# Patient Record
Sex: MALE | Race: WHITE | NOT HISPANIC OR LATINO | Employment: FULL TIME | URBAN - METROPOLITAN AREA
[De-identification: names, ages, dates, MRNs, and addresses within clinical notes are randomized per-mention and may not be internally consistent; named-entity substitution may affect disease eponyms.]

---

## 2017-01-03 RX ORDER — VALSARTAN 320 MG/1
TABLET ORAL
Qty: 90 TABLET | Refills: 1 | Status: SHIPPED | OUTPATIENT
Start: 2017-01-03 | End: 2017-07-03 | Stop reason: SDUPTHER

## 2017-01-10 ENCOUNTER — APPOINTMENT (OUTPATIENT)
Dept: GENERAL RADIOLOGY | Facility: HOSPITAL | Age: 72
End: 2017-01-10

## 2017-01-10 ENCOUNTER — TELEPHONE (OUTPATIENT)
Dept: CARDIOLOGY | Facility: CLINIC | Age: 72
End: 2017-01-10

## 2017-01-10 ENCOUNTER — APPOINTMENT (OUTPATIENT)
Dept: CARDIOLOGY | Facility: HOSPITAL | Age: 72
End: 2017-01-10
Attending: INTERNAL MEDICINE

## 2017-01-10 ENCOUNTER — HOSPITAL ENCOUNTER (OUTPATIENT)
Facility: HOSPITAL | Age: 72
Setting detail: OBSERVATION
Discharge: HOME | End: 2017-01-11
Attending: EMERGENCY MEDICINE | Admitting: INTERNAL MEDICINE

## 2017-01-10 DIAGNOSIS — I48.91 NEW ONSET ATRIAL FIBRILLATION (HCC): Primary | ICD-10-CM

## 2017-01-10 LAB
ALBUMIN SERPL-MCNC: 4.8 G/DL (ref 3.5–5.2)
ALBUMIN/GLOB SERPL: 1.8 G/DL
ALP SERPL-CCNC: 60 U/L (ref 39–117)
ALT SERPL W P-5'-P-CCNC: 19 U/L (ref 1–41)
ANION GAP SERPL CALCULATED.3IONS-SCNC: 15.4 MMOL/L
APTT PPP: 32.8 SECONDS (ref 22.7–35.4)
AST SERPL-CCNC: 22 U/L (ref 1–40)
BASOPHILS # BLD AUTO: 0.06 10*3/MM3 (ref 0–0.2)
BASOPHILS NFR BLD AUTO: 0.9 % (ref 0–1.5)
BH CV ECHO MEAS - ACS: 2 CM
BH CV ECHO MEAS - AO MEAN PG (FULL): 1 MMHG
BH CV ECHO MEAS - AO MEAN PG: 2 MMHG
BH CV ECHO MEAS - AO ROOT AREA (BSA CORRECTED): 1.7
BH CV ECHO MEAS - AO ROOT AREA: 9.6 CM^2
BH CV ECHO MEAS - AO ROOT DIAM: 3.5 CM
BH CV ECHO MEAS - AO V2 MEAN: 65.9 CM/SEC
BH CV ECHO MEAS - AO V2 VTI: 18.1 CM
BH CV ECHO MEAS - ASC AORTA: 3.1 CM
BH CV ECHO MEAS - AVA(I,A): 2.3 CM^2
BH CV ECHO MEAS - AVA(I,D): 2.3 CM^2
BH CV ECHO MEAS - BSA(HAYCOCK): 2.2 M^2
BH CV ECHO MEAS - BSA: 2.1 M^2
BH CV ECHO MEAS - BZI_BMI: 31.6 KILOGRAMS/M^2
BH CV ECHO MEAS - BZI_METRIC_HEIGHT: 172.7 CM
BH CV ECHO MEAS - BZI_METRIC_WEIGHT: 94.3 KG
BH CV ECHO MEAS - CONTRAST EF (2CH): 62.2 ML/M^2
BH CV ECHO MEAS - CONTRAST EF 4CH: 61 ML/M^2
BH CV ECHO MEAS - EDV(CUBED): 74.1 ML
BH CV ECHO MEAS - EDV(MOD-SP2): 111 ML
BH CV ECHO MEAS - EDV(MOD-SP4): 105 ML
BH CV ECHO MEAS - EDV(TEICH): 78.6 ML
BH CV ECHO MEAS - EF(CUBED): 76.3 %
BH CV ECHO MEAS - EF(MOD-SP2): 62.2 %
BH CV ECHO MEAS - EF(MOD-SP4): 61 %
BH CV ECHO MEAS - EF(TEICH): 68.7 %
BH CV ECHO MEAS - ESV(CUBED): 17.6 ML
BH CV ECHO MEAS - ESV(MOD-SP2): 42 ML
BH CV ECHO MEAS - ESV(MOD-SP4): 41 ML
BH CV ECHO MEAS - ESV(TEICH): 24.6 ML
BH CV ECHO MEAS - FS: 38.1 %
BH CV ECHO MEAS - IVS/LVPW: 1
BH CV ECHO MEAS - IVSD: 1 CM
BH CV ECHO MEAS - LAT PEAK E' VEL: 15 CM/SEC
BH CV ECHO MEAS - LV DIASTOLIC VOL/BSA (35-75): 50.5 ML/M^2
BH CV ECHO MEAS - LV MASS(C)D: 137.2 GRAMS
BH CV ECHO MEAS - LV MASS(C)DI: 66 GRAMS/M^2
BH CV ECHO MEAS - LV MEAN PG: 1 MMHG
BH CV ECHO MEAS - LV SYSTOLIC VOL/BSA (12-30): 19.7 ML/M^2
BH CV ECHO MEAS - LV V1 MEAN: 50.6 CM/SEC
BH CV ECHO MEAS - LV V1 VTI: 13.5 CM
BH CV ECHO MEAS - LVIDD: 4.2 CM
BH CV ECHO MEAS - LVIDS: 2.6 CM
BH CV ECHO MEAS - LVLD AP2: 8.1 CM
BH CV ECHO MEAS - LVLD AP4: 7.9 CM
BH CV ECHO MEAS - LVLS AP2: 7 CM
BH CV ECHO MEAS - LVLS AP4: 6.4 CM
BH CV ECHO MEAS - LVOT AREA (M): 3.1 CM^2
BH CV ECHO MEAS - LVOT AREA: 3.1 CM^2
BH CV ECHO MEAS - LVOT DIAM: 2 CM
BH CV ECHO MEAS - LVPWD: 1 CM
BH CV ECHO MEAS - MED PEAK E' VEL: 11 CM/SEC
BH CV ECHO MEAS - MV DEC SLOPE: 457 CM/SEC^2
BH CV ECHO MEAS - MV DEC TIME: 0.22 SEC
BH CV ECHO MEAS - MV E MAX VEL: 68.3 CM/SEC
BH CV ECHO MEAS - MV MEAN PG: 1 MMHG
BH CV ECHO MEAS - MV P1/2T MAX VEL: 82.1 CM/SEC
BH CV ECHO MEAS - MV P1/2T: 52.6 MSEC
BH CV ECHO MEAS - MV V2 MEAN: 41.6 CM/SEC
BH CV ECHO MEAS - MV V2 VTI: 18.7 CM
BH CV ECHO MEAS - MVA P1/2T LCG: 2.7 CM^2
BH CV ECHO MEAS - MVA(P1/2T): 4.2 CM^2
BH CV ECHO MEAS - MVA(VTI): 2.3 CM^2
BH CV ECHO MEAS - PA ACC SLOPE: 12.9 CM/SEC^2
BH CV ECHO MEAS - PA ACC TIME: 0.11 SEC
BH CV ECHO MEAS - PA MAX PG: 1.9 MMHG
BH CV ECHO MEAS - PA PR(ACCEL): 31.3 MMHG
BH CV ECHO MEAS - PA V2 MAX: 68.5 CM/SEC
BH CV ECHO MEAS - QP/QS: 0.78
BH CV ECHO MEAS - RAP SYSTOLE: 3 MMHG
BH CV ECHO MEAS - RV MEAN PG: 0 MMHG
BH CV ECHO MEAS - RV V1 MEAN: 24.4 CM/SEC
BH CV ECHO MEAS - RV V1 VTI: 8.7 CM
BH CV ECHO MEAS - RVOT AREA: 3.8 CM^2
BH CV ECHO MEAS - RVOT DIAM: 2.2 CM
BH CV ECHO MEAS - RVSP: 20.5 MMHG
BH CV ECHO MEAS - SI(AO): 83.8 ML/M^2
BH CV ECHO MEAS - SI(CUBED): 27.2 ML/M^2
BH CV ECHO MEAS - SI(LVOT): 20.4 ML/M^2
BH CV ECHO MEAS - SI(MOD-SP2): 33.2 ML/M^2
BH CV ECHO MEAS - SI(MOD-SP4): 30.8 ML/M^2
BH CV ECHO MEAS - SI(TEICH): 26 ML/M^2
BH CV ECHO MEAS - SV(AO): 174.1 ML
BH CV ECHO MEAS - SV(CUBED): 56.5 ML
BH CV ECHO MEAS - SV(LVOT): 42.4 ML
BH CV ECHO MEAS - SV(MOD-SP2): 69 ML
BH CV ECHO MEAS - SV(MOD-SP4): 64 ML
BH CV ECHO MEAS - SV(RVOT): 33.2 ML
BH CV ECHO MEAS - SV(TEICH): 54 ML
BH CV ECHO MEAS - TAPSE (>1.6): 1.8 CM2
BH CV ECHO MEAS - TR MAX VEL: 209 CM/SEC
BH CV XLRA - RV BASE: 4 CM
BH CV XLRA - TDI S': 11 CM/SEC
BILIRUB SERPL-MCNC: 0.7 MG/DL (ref 0.1–1.2)
BUN BLD-MCNC: 20 MG/DL (ref 8–23)
BUN/CREAT SERPL: 20.6 (ref 7–25)
CALCIUM SPEC-SCNC: 9.6 MG/DL (ref 8.6–10.5)
CHLORIDE SERPL-SCNC: 103 MMOL/L (ref 98–107)
CO2 SERPL-SCNC: 24.6 MMOL/L (ref 22–29)
CREAT BLD-MCNC: 0.97 MG/DL (ref 0.76–1.27)
DEPRECATED RDW RBC AUTO: 44.9 FL (ref 37–54)
E/E' RATIO: 5.5
EOSINOPHIL # BLD AUTO: 0.15 10*3/MM3 (ref 0–0.7)
EOSINOPHIL NFR BLD AUTO: 2.3 % (ref 0.3–6.2)
ERYTHROCYTE [DISTWIDTH] IN BLOOD BY AUTOMATED COUNT: 13.1 % (ref 11.5–14.5)
GFR SERPL CREATININE-BSD FRML MDRD: 76 ML/MIN/1.73
GLOBULIN UR ELPH-MCNC: 2.7 GM/DL
GLUCOSE BLD-MCNC: 120 MG/DL (ref 65–99)
HCT VFR BLD AUTO: 45.8 % (ref 40.4–52.2)
HGB BLD-MCNC: 15.2 G/DL (ref 13.7–17.6)
HOLD SPECIMEN: NORMAL
HOLD SPECIMEN: NORMAL
IMM GRANULOCYTES # BLD: 0 10*3/MM3 (ref 0–0.03)
IMM GRANULOCYTES NFR BLD: 0 % (ref 0–0.5)
INR PPP: 1.09 (ref 0.9–1.1)
LEFT ATRIUM VOLUME INDEX: 34 ML/M2
LYMPHOCYTES # BLD AUTO: 2.11 10*3/MM3 (ref 0.9–4.8)
LYMPHOCYTES NFR BLD AUTO: 32.5 % (ref 19.6–45.3)
MAGNESIUM SERPL-MCNC: 1.9 MG/DL (ref 1.6–2.4)
MCH RBC QN AUTO: 31 PG (ref 27–32.7)
MCHC RBC AUTO-ENTMCNC: 33.2 G/DL (ref 32.6–36.4)
MCV RBC AUTO: 93.5 FL (ref 79.8–96.2)
MONOCYTES # BLD AUTO: 0.37 10*3/MM3 (ref 0.2–1.2)
MONOCYTES NFR BLD AUTO: 5.7 % (ref 5–12)
NEUTROPHILS # BLD AUTO: 3.81 10*3/MM3 (ref 1.9–8.1)
NEUTROPHILS NFR BLD AUTO: 58.6 % (ref 42.7–76)
PLATELET # BLD AUTO: 179 10*3/MM3 (ref 140–500)
PMV BLD AUTO: 9.9 FL (ref 6–12)
POTASSIUM BLD-SCNC: 3.6 MMOL/L (ref 3.5–5.2)
PROT SERPL-MCNC: 7.5 G/DL (ref 6–8.5)
PROTHROMBIN TIME: 13.7 SECONDS (ref 11.7–14.2)
RBC # BLD AUTO: 4.9 10*6/MM3 (ref 4.6–6)
SODIUM BLD-SCNC: 143 MMOL/L (ref 136–145)
T4 FREE SERPL-MCNC: 1.16 NG/DL (ref 0.93–1.7)
TROPONIN T SERPL-MCNC: <0.01 NG/ML (ref 0–0.03)
TROPONIN T SERPL-MCNC: <0.01 NG/ML (ref 0–0.03)
TSH SERPL DL<=0.05 MIU/L-ACNC: 2.32 MIU/ML (ref 0.27–4.2)
WBC NRBC COR # BLD: 6.5 10*3/MM3 (ref 4.5–10.7)
WHOLE BLOOD HOLD SPECIMEN: NORMAL
WHOLE BLOOD HOLD SPECIMEN: NORMAL

## 2017-01-10 PROCEDURE — 93010 ELECTROCARDIOGRAM REPORT: CPT | Performed by: INTERNAL MEDICINE

## 2017-01-10 PROCEDURE — 85025 COMPLETE CBC W/AUTO DIFF WBC: CPT | Performed by: EMERGENCY MEDICINE

## 2017-01-10 PROCEDURE — 80053 COMPREHEN METABOLIC PANEL: CPT | Performed by: EMERGENCY MEDICINE

## 2017-01-10 PROCEDURE — G0378 HOSPITAL OBSERVATION PER HR: HCPCS

## 2017-01-10 PROCEDURE — 93005 ELECTROCARDIOGRAM TRACING: CPT | Performed by: INTERNAL MEDICINE

## 2017-01-10 PROCEDURE — 84484 ASSAY OF TROPONIN QUANT: CPT | Performed by: EMERGENCY MEDICINE

## 2017-01-10 PROCEDURE — 25010000002 PERFLUTREN (DEFINITY) 8.476 MG IN SODIUM CHLORIDE 10 ML INJECTION: Performed by: INTERNAL MEDICINE

## 2017-01-10 PROCEDURE — 93306 TTE W/DOPPLER COMPLETE: CPT | Performed by: INTERNAL MEDICINE

## 2017-01-10 PROCEDURE — 96376 TX/PRO/DX INJ SAME DRUG ADON: CPT

## 2017-01-10 PROCEDURE — 99285 EMERGENCY DEPT VISIT HI MDM: CPT

## 2017-01-10 PROCEDURE — 84439 ASSAY OF FREE THYROXINE: CPT | Performed by: EMERGENCY MEDICINE

## 2017-01-10 PROCEDURE — 84443 ASSAY THYROID STIM HORMONE: CPT | Performed by: EMERGENCY MEDICINE

## 2017-01-10 PROCEDURE — 99219 PR INITIAL OBSERVATION CARE/DAY 50 MINUTES: CPT | Performed by: INTERNAL MEDICINE

## 2017-01-10 PROCEDURE — 96375 TX/PRO/DX INJ NEW DRUG ADDON: CPT

## 2017-01-10 PROCEDURE — 93005 ELECTROCARDIOGRAM TRACING: CPT

## 2017-01-10 PROCEDURE — 83735 ASSAY OF MAGNESIUM: CPT | Performed by: EMERGENCY MEDICINE

## 2017-01-10 PROCEDURE — C8929 TTE W OR WO FOL WCON,DOPPLER: HCPCS

## 2017-01-10 PROCEDURE — 85610 PROTHROMBIN TIME: CPT | Performed by: EMERGENCY MEDICINE

## 2017-01-10 PROCEDURE — 96365 THER/PROPH/DIAG IV INF INIT: CPT

## 2017-01-10 PROCEDURE — 84484 ASSAY OF TROPONIN QUANT: CPT | Performed by: INTERNAL MEDICINE

## 2017-01-10 PROCEDURE — 71010 HC CHEST PA OR AP: CPT

## 2017-01-10 PROCEDURE — 85730 THROMBOPLASTIN TIME PARTIAL: CPT | Performed by: EMERGENCY MEDICINE

## 2017-01-10 PROCEDURE — 96366 THER/PROPH/DIAG IV INF ADDON: CPT

## 2017-01-10 RX ORDER — METOPROLOL SUCCINATE 25 MG/1
25 TABLET, EXTENDED RELEASE ORAL EVERY 12 HOURS SCHEDULED
Status: DISCONTINUED | OUTPATIENT
Start: 2017-01-10 | End: 2017-01-11 | Stop reason: HOSPADM

## 2017-01-10 RX ORDER — SODIUM CHLORIDE 0.9 % (FLUSH) 0.9 %
10 SYRINGE (ML) INJECTION AS NEEDED
Status: DISCONTINUED | OUTPATIENT
Start: 2017-01-10 | End: 2017-01-11 | Stop reason: HOSPADM

## 2017-01-10 RX ORDER — VALSARTAN 320 MG/1
320 TABLET ORAL
Status: DISCONTINUED | OUTPATIENT
Start: 2017-01-10 | End: 2017-01-11 | Stop reason: HOSPADM

## 2017-01-10 RX ORDER — ASPIRIN 81 MG/1
81 TABLET ORAL ONCE
Status: DISCONTINUED | OUTPATIENT
Start: 2017-01-10 | End: 2017-01-11 | Stop reason: HOSPADM

## 2017-01-10 RX ORDER — FINASTERIDE 5 MG/1
5 TABLET, FILM COATED ORAL DAILY
Status: DISCONTINUED | OUTPATIENT
Start: 2017-01-10 | End: 2017-01-11 | Stop reason: HOSPADM

## 2017-01-10 RX ORDER — SODIUM CHLORIDE 0.9 % (FLUSH) 0.9 %
1-10 SYRINGE (ML) INJECTION AS NEEDED
Status: DISCONTINUED | OUTPATIENT
Start: 2017-01-10 | End: 2017-01-11 | Stop reason: HOSPADM

## 2017-01-10 RX ORDER — ASPIRIN 325 MG
325 TABLET ORAL ONCE
Status: DISCONTINUED | OUTPATIENT
Start: 2017-01-10 | End: 2017-01-10

## 2017-01-10 RX ORDER — CETIRIZINE HYDROCHLORIDE 10 MG/1
10 TABLET ORAL DAILY
Status: DISCONTINUED | OUTPATIENT
Start: 2017-01-10 | End: 2017-01-11 | Stop reason: HOSPADM

## 2017-01-10 RX ADMIN — DILTIAZEM HYDROCHLORIDE 5 MG/HR: 100 INJECTION, POWDER, LYOPHILIZED, FOR SOLUTION INTRAVENOUS at 12:03

## 2017-01-10 RX ADMIN — METOPROLOL SUCCINATE 25 MG: 25 TABLET, FILM COATED, EXTENDED RELEASE ORAL at 18:37

## 2017-01-10 RX ADMIN — METOPROLOL TARTRATE 5 MG: 5 INJECTION INTRAVENOUS at 13:51

## 2017-01-10 RX ADMIN — PERFLUTREN 2 ML: 6.52 INJECTION, SUSPENSION INTRAVENOUS at 16:58

## 2017-01-10 RX ADMIN — FINASTERIDE 5 MG: 5 TABLET, FILM COATED ORAL at 18:37

## 2017-01-10 RX ADMIN — METOPROLOL TARTRATE 5 MG: 5 INJECTION INTRAVENOUS at 13:42

## 2017-01-10 NOTE — IP AVS SNAPSHOT
AFTER VISIT SUMMARY             Carlo Archer           About your hospitalization     You were admitted on:  January 10, 2017 You last received care in the:  [address] [facility]       Procedures & Surgeries         Medications    If you or your caregiver advised us that you are currently taking a medication and that medication is marked below as “Resume”, this simply indicates that we have reviewed those medications to make sure our new therapy recommendations do not interfere.  If you have concerns about medications other than those new ones which we are prescribing today, please consult the physician who prescribed them (or your primary physician).  Our review of your home medications is not meant to indicate that we are directing their use.             Your Medications      START taking these medications     metoprolol succinate XL 25 MG 24 hr tablet   Take 0.5 tablets by mouth Daily.   Last time this was given:  1/11/2017  8:50 AM   Commonly known as:  TOPROL-XL             CONTINUE taking these medications     aspirin 81 MG tablet   Take  by mouth.           atorvastatin 40 MG tablet   Take one tablet qhs. MUST MAKE A FOLLOW UP AND GET CHOLESTEROL LABS PRIOR TO ANY FURTHER REFILLS OR A 90 DAY SUPPLY   Commonly known as:  LIPITOR           BIOTIN PO   Take  by mouth.           CIALIS 5 MG tablet   Take  by mouth.   Generic drug:  tadalafil           CLARITIN 10 MG tablet   Take  by mouth.   Generic drug:  loratadine           EPIPEN 2-MARY 0.3 MG/0.3ML solution auto-injector injection   EpiPen TRINY; Patient Sig: Amairani AGOSTO ; 0; 06-Jan-2015; Active   Generic drug:  EPINEPHrine           finasteride 5 MG tablet   Take  by mouth daily.   Last time this was given:  1/10/2017  6:37 PM   Commonly known as:  PROSCAR           FLOMAX 0.4 MG capsule 24 hr capsule   Take  by mouth.   Generic drug:  tamsulosin           fluticasone 50 MCG/ACT nasal spray   into each nostril.   Commonly known as:   FLONASE           LUMIGAN 0.03 % ophthalmic drops   Apply  to eye.   Generic drug:  bimatoprost           meloxicam 15 MG tablet   Take  by mouth daily.   Commonly known as:  MOBIC           NEXIUM 40 MG capsule   Take  by mouth daily.   Generic drug:  esomeprazole           NIACIN PO   Take  by mouth.           valsartan 320 MG tablet   take 1 tablet by mouth once daily   Last time this was given:  1/11/2017  8:50 AM   Commonly known as:  DIOVAN           VITAMIN B-12 PO   Take  by mouth.             STOP taking these medications     amLODIPine 5 MG tablet   Commonly known as:  NORVASC                Where to Get Your Medications      These medications were sent to [facility] [place] [address], [place] - [address] [address] - [phone]  - [phone] [address] [place] [address]     Phone:  [phone]     metoprolol succinate XL 25 MG 24 hr tablet                  Your Medications      Your Medication List           Morning Noon Evening Bedtime As Needed    aspirin 81 MG tablet   Take  by mouth.            **NEXT DOSE DUE 1/11/17**                       atorvastatin 40 MG tablet   Take one tablet qhs. MUST MAKE A FOLLOW UP AND GET CHOLESTEROL LABS PRIOR TO ANY FURTHER REFILLS OR A 90 DAY SUPPLY   Commonly known as:  LIPITOR            **NEXT DOSE DUE 1/11/17**                       BIOTIN PO   Take  by mouth.            **NEXT DOSE DUE 1/11/17**                       CIALIS 5 MG tablet   Take  by mouth.   Generic drug:  tadalafil                                   CLARITIN 10 MG tablet   Take  by mouth.   Generic drug:  loratadine            **NEXT DUE DOSE 1/11/17**                        EPIPEN 2-MARY 0.3 MG/0.3ML solution auto-injector injection   EpiPen TRINY; Patient Sig: Amairani AGOSTO ; 0; 06-Jan-2015; Active   Generic drug:  EPINEPHrine                                   finasteride 5 MG tablet   Take  by mouth daily.   Commonly known as:  PROSCAR            **NEXT DOSE DUE 1/11/17**                        FLOMAX 0.4 MG capsule 24 hr capsule   Take  by mouth.   Generic drug:  tamsulosin            **NEXT DOSE DUE 1/11/17**                       fluticasone 50 MCG/ACT nasal spray   into each nostril.   Commonly known as:  FLONASE                                   LUMIGAN 0.03 % ophthalmic drops   Apply  to eye.   Generic drug:  bimatoprost                                   meloxicam 15 MG tablet   Take  by mouth daily.   Commonly known as:  MOBIC            **NEXT DOSE DUE 1/11/17**                       metoprolol succinate XL 25 MG 24 hr tablet   Take 0.5 tablets by mouth Daily.   Commonly known as:  TOPROL-XL            **NEXT DOSE DUE 1/12/17**                       NEXIUM 40 MG capsule   Take  by mouth daily.   Generic drug:  esomeprazole            **NEXT DOSE DUE 1/11/17**                       NIACIN PO   Take  by mouth.            **NEXT DOSE DUE 1/11/17**                       valsartan 320 MG tablet   take 1 tablet by mouth once daily   Commonly known as:  DIOVAN            **NEXT DOSE DUE 1/12/17**                       VITAMIN B-12 PO   Take  by mouth.            **NEXT DOSE DUE 1/11/17**                                Instructions for After Discharge        Discharge References/Attachments     ATRIAL FIBRILLATION, EASY-TO-READ (ENGLISH)    METOPROLOL EXTENDED-RELEASE TABLETS (ENGLISH)    CARDIAC DIET, EASY-TO-READ (ENGLISH)       Follow-ups for After Discharge        Follow-up Information     Follow up with Keo Montero MD Follow up in 4 week(s).    Specialty:  Cardiology    Contact information:    [address] [facility] [address]  [place] [address]  [phone]        [employer] Signup     [facility] [employer] allows you to send messages to your doctor, view your test results, renew your prescriptions, schedule appointments, and more. To sign up, go to [employer] and click on the Sign Up Now link in the New User? box. Enter your [employer] Activation Code exactly as it  appears below along with the last four digits of your Social Security Number and your Date of Birth () to complete the sign-up process. If you do not sign up before the expiration date, you must request a new code.    [employer] Activation Code: [email or web address]  Expires: 2017  8:30 AM    If you have questions, you can email [email or web address] or call [phone] to talk to our [employer] staff. Remember, [employer] is NOT to be used for urgent needs. For medical emergencies, dial 911.           Summary of Your Hospitalization        Reason for Hospitalization     Your primary diagnosis was:  Not on File    Your diagnoses also included:  New Onset Atrial Fibrillation      Care Providers     Provider Service Role Specialty    Keo Montero MD -- Attending Provider Cardiology    Keo Montero MD -- Consulting Physician  Cardiology      Your Allergies  Date Reviewed: 1/10/2017    Allergen Reactions    Penicillins Anaphylaxis         Streptomycin Anaphylaxis         Bee Venom Not Noted      Patient Belongings Returned     Document Return of Belongings Flowsheet     Were the patient bedside belongings sent home?   Yes   Belongings Retrieved from Security & Sent Home   N/A    Belongings Sent to Safe   --   Medications Retrieved from Pharmacy & Sent Home   N/A              More Information      Atrial Fibrillation  Atrial fibrillation is a type of heartbeat that is irregular or fast (rapid). If you have this condition, your heart keeps quivering in a weird (chaotic) way. This condition can make it so your heart cannot pump blood normally. Having this condition gives a person more risk for stroke, heart failure, and other heart problems. There are different types of atrial fibrillation. Talk with your doctor to learn about the type that you have.  HOME CARE  · Take over-the-counter and prescription medicines only as told by your doctor.  · If your doctor prescribed a blood-thinning medicine, take it  exactly as told. Taking too much of it can cause bleeding. If you do not take enough of it, you will not have the protection that you need against stroke and other problems.  · Do not use any tobacco products. These include cigarettes, chewing tobacco, and e-cigarettes. If you need help quitting, ask your doctor.  · If you have apnea (obstructive sleep apnea), manage it as told by your doctor.  · Do not drink alcohol.  · Do not drink beverages that have caffeine. These include coffee, soda, and tea.  · Maintain a healthy weight. Do not use diet pills unless your doctor says they are safe for you. Diet pills may make heart problems worse.  · Follow diet instructions as told by your doctor.  · Exercise regularly as told by your doctor.  · Keep all follow-up visits as told by your doctor. This is important.  GET HELP IF:  · You notice a change in the speed, rhythm, or strength of your heartbeat.  · You are taking a blood-thinning medicine and you notice more bruising.  · You get tired more easily when you move or exercise.  GET HELP RIGHT AWAY IF:  · You have pain in your chest or your belly (abdomen).  · You have sweating or weakness.  · You feel sick to your stomach (nauseous).  · You notice blood in your throw up (vomit), poop (stool), or pee (urine).  · You are short of breath.  · You suddenly have swollen feet and ankles.  · You feel dizzy.  · Your suddenly get weak or numb in your face, arms, or legs, especially if it happens on one side of your body.  · You have trouble talking, trouble understanding, or both.  · Your face or your eyelid droops on one side.  These symptoms may be an emergency. Do not wait to see if the symptoms will go away. Get medical help right away. Call your local emergency services (911 in the [place] Do not drive yourself to the hospital.     This information is not intended to replace advice given to you by your health care provider. Make sure you discuss any questions you have with your  health care provider.     Document Released: 09/26/2009 Document Revised: 09/07/2016 Document Reviewed: 04/13/2016  [employer] Interactive Patient Education ©2016 [employer]          Metoprolol extended-release tablets  What is this medicine?  METOPROLOL (me TOE proe lole) is a beta-blocker. Beta-blockers reduce the workload on the heart and help it to beat more regularly. This medicine is used to treat high blood pressure and to prevent chest pain. It is also used to after a heart attack and to prevent an additional heart attack from occurring.  This medicine may be used for other purposes; ask your health care provider or pharmacist if you have questions.  What should I tell my health care provider before I take this medicine?  They need to know if you have any of these conditions:  -diabetes  -heart or vessel disease like slow heart rate, worsening heart failure, heart block, sick sinus syndrome or Raynaud's disease  -kidney disease  -liver disease  -lung or breathing disease, like asthma or emphysema  -pheochromocytoma  -thyroid disease  -an unusual or allergic reaction to metoprolol, other beta-blockers, medicines, foods, dyes, or preservatives  -pregnant or trying to get pregnant  -breast-feeding  How should I use this medicine?  Take this medicine by mouth with a glass of water. Follow the directions on the prescription label. Do not crush or chew. Take this medicine with or immediately after meals. Take your doses at regular intervals. Do not take more medicine than directed. Do not stop taking this medicine suddenly. This could lead to serious heart-related effects.  Talk to your pediatrician regarding the use of this medicine in children. While this drug may be prescribed for children as young as 6 years for selected conditions, precautions do apply.  Overdosage: If you think you have taken too much of this medicine contact a poison control center or emergency room at once.  NOTE: This medicine is only  for you. Do not share this medicine with others.  What if I miss a dose?  If you miss a dose, take it as soon as you can. If it is almost time for your next dose, take only that dose. Do not take double or extra doses.  What may interact with this medicine?  This medicine may interact with the following medications:  -certain medicines for blood pressure, heart disease, irregular heart beat  -certain medicines for depression, like monoamine oxidase (MAO) inhibitors, fluoxetine, or paroxetine  -clonidine  -dobutamine  -epinephrine  -isoproterenol  -reserpine  This list may not describe all possible interactions. Give your health care provider a list of all the medicines, herbs, non-prescription drugs, or dietary supplements you use. Also tell them if you smoke, drink alcohol, or use illegal drugs. Some items may interact with your medicine.  What should I watch for while using this medicine?  Visit your doctor or health care professional for regular check ups. Contact your doctor right away if your symptoms worsen. Check your blood pressure and pulse rate regularly. Ask your health care professional what your blood pressure and pulse rate should be, and when you should contact them.  You may get drowsy or dizzy. Do not drive, use machinery, or do anything that needs mental alertness until you know how this medicine affects you. Do not sit or stand up quickly, especially if you are an older patient. This reduces the risk of dizzy or fainting spells. Contact your doctor if these symptoms continue. Alcohol may interfere with the effect of this medicine. Avoid alcoholic drinks.  What side effects may I notice from receiving this medicine?  Side effects that you should report to your doctor or health care professional as soon as possible:  -allergic reactions like skin rash, itching or hives  -cold or numb hands or feet  -depression  -difficulty breathing  -faint  -fever with sore throat  -irregular heartbeat, chest  pain  -rapid weight gain  -swollen legs or ankles  Side effects that usually do not require medical attention (report to your doctor or health care professional if they continue or are bothersome):  -anxiety or nervousness  -change in sex drive or performance  -dry skin  -headache  -nightmares or trouble sleeping  -short term memory loss  -stomach upset or diarrhea  -unusually tired  This list may not describe all possible side effects. Call your doctor for medical advice about side effects. You may report side effects to FDA at 5-171-HSN-9344.  Where should I keep my medicine?  Keep out of the reach of children.  Store at room temperature between 15 and 30 degrees C (59 and 86 degrees F). Throw away any unused medicine after the expiration date.  NOTE: This sheet is a summary. It may not cover all possible information. If you have questions about this medicine, talk to your doctor, pharmacist, or health care provider.     © 2016, Elsevier/Gold Standard. (2014-08-22 14:41:37)          Heart-Healthy Eating Plan  Heart-healthy meal planning includes:  · Limiting unhealthy fats.  · Increasing healthy fats.  · Making other small dietary changes.  You may need to talk with your doctor or a diet specialist (dietitian) to create an eating plan that is right for you.  WHAT TYPES OF FAT SHOULD I CHOOSE?  · Choose healthy fats. These include olive oil and canola oil, flaxseeds, walnuts, almonds, and seeds.  · Eat more omega-3 fats. These include salmon, mackerel, sardines, tuna, flaxseed oil, and ground flaxseeds. Try to eat fish at least twice each week.  · Limit saturated fats.    Saturated fats are often found in animal products, such as meats, butter, and cream.    Plant sources of saturated fats include palm oil, palm kernel oil, and coconut oil.  · Avoid foods with partially hydrogenated oils in them. These include stick margarine, some tub margarines, cookies, crackers, and other baked goods. These contain trans  "fats.  WHAT GENERAL GUIDELINES DO I NEED TO FOLLOW?  · Check food labels carefully. Identify foods with trans fats or high amounts of saturated fat.  · Fill one half of your plate with vegetables and green salads. Eat 4-5 servings of vegetables per day. A serving of vegetables is:    1 cup of raw leafy vegetables.    ½ cup of raw or cooked cut-up vegetables.    ½ cup of vegetable juice.  · Fill one fourth of your plate with whole grains. Look for the word \"whole\" as the first word in the ingredient list.  · Fill one fourth of your plate with lean protein foods.  · Eat 4-5 servings of fruit per day. A serving of fruit is:    One medium whole fruit.    ¼ cup of dried fruit.    ½ cup of fresh, frozen, or canned fruit.    ½ cup of 100% fruit juice.  · Eat more foods that contain soluble fiber. These include apples, broccoli, carrots, beans, peas, and barley. Try to get 20-30 g of fiber per day.  · Eat more home-cooked food. Eat less restaurant, buffet, and fast food.  · Limit or avoid alcohol.  · Limit foods high in starch and sugar.  · Avoid fried foods.  · Avoid frying your food. Try baking, boiling, grilling, or broiling it instead. You can also reduce fat by:    Removing the skin from poultry.    Removing all visible fats from meats.    Skimming the fat off of stews, soups, and gravies before serving them.    Steaming vegetables in water or broth.  · Lose weight if you are overweight.  · Eat 4-5 servings of nuts, legumes, and seeds per week:    One serving of dried beans or legumes equals ½ cup after being cooked.    One serving of nuts equals 1½ ounces.    One serving of seeds equals ½ ounce or one tablespoon.  · You may need to keep track of how much salt or sodium you eat. This is especially true if you have high blood pressure. Talk with your doctor or dietitian to get more information.  WHAT FOODS CAN I EAT?  Grains  Breads, including Greenlandic, white, jayna, wheat, raisin, rye, oatmeal, and Italian. Tortillas that " are neither fried nor made with lard or trans fat. Low-fat rolls, including hotdog and hamburger buns and English muffins. Biscuits. Muffins. Waffles. Pancakes. Light popcorn. Whole-grain cereals. Flatbread. Yanet toast. Pretzels. Breadsticks. Rusks. Low-fat snacks. Low-fat crackers, including oyster, saltine, matzo, leslie, animal, and rye. Rice and pasta, including brown rice and pastas that are made with whole wheat.   Vegetables  All vegetables.   Fruits  All fruits, but limit coconut.  Meats and Other Protein Sources  Lean, well-trimmed beef, veal, pork, and lamb. Chicken and turkey without skin. All fish and shellfish. Wild duck, rabbit, pheasant, and venison. Egg whites or low-cholesterol egg substitutes. Dried beans, peas, lentils, and tofu. Seeds and most nuts.  Dairy  Low-fat or nonfat cheeses, including ricotta, string, and mozzarella. Skim or 1% milk that is liquid, powdered, or evaporated. Buttermilk that is made with low-fat milk. Nonfat or low-fat yogurt.  Beverages  Mineral water. Diet carbonated beverages.  Sweets and Desserts  Sherbets and fruit ices. Honey, jam, marmalade, jelly, and syrups. Meringues and gelatins. Pure sugar candy, such as hard candy, jelly beans, gumdrops, mints, marshmallows, and small amounts of dark chocolate. Ean food cake.  Eat all sweets and desserts in moderation.  Fats and Oils  Nonhydrogenated (trans-free) margarines. Vegetable oils, including soybean, sesame, sunflower, olive, peanut, safflower, corn, canola, and cottonseed. Salad dressings or mayonnaise made with a vegetable oil. Limit added fats and oils that you use for cooking, baking, salads, and as spreads.  Other  Cocoa powder. Coffee and tea. All seasonings and condiments.  The items listed above may not be a complete list of recommended foods or beverages. Contact your dietitian for more options.  WHAT FOODS ARE NOT RECOMMENDED?  Grains  Breads that are made with saturated or trans fats, oils, or whole milk.  Croissants. Butter rolls. Cheese breads. Sweet rolls. Donuts. Buttered popcorn. Chow mein noodles. High-fat crackers, such as cheese or butter crackers.  Meats and Other Protein Sources  Fatty meats, such as hotdogs, short ribs, sausage, spareribs, hawthorne, rib eye roast or steak, and mutton. High-fat deli meats, such as salami and bologna. Caviar. Domestic duck and goose. Organ meats, such as kidney, liver, sweetbreads, and heart.  Dairy  Cream, sour cream, cream cheese, and creamed cottage cheese. Whole-milk cheeses, including blue (he), Raymond Dhruv, Brie, Lopez, American, Havarti, Swiss, cheddar, Camembert, and [place]. Whole or 2% milk that is liquid, evaporated, or condensed. Whole buttermilk. Cream sauce or high-fat cheese sauce. Yogurt that is made from whole milk.  Beverages  Regular sodas and juice drinks with added sugar.  Sweets and Desserts  Frosting. Pudding. Cookies. Cakes other than laron food cake. Candy that has milk chocolate or white chocolate, hydrogenated fat, butter, coconut, or unknown ingredients. Buttered syrups. Full-fat ice cream or ice cream drinks.  Fats and Oils  Gravy that has suet, meat fat, or shortening. Cocoa butter, hydrogenated oils, palm oil, coconut oil, palm kernel oil. These can often be found in baked products, candy, fried foods, nondairy creamers, and whipped toppings. Solid fats and shortenings, including hawthorne fat, salt pork, lard, and butter. Nondairy cream substitutes, such as coffee creamers and sour cream substitutes. Salad dressings that are made of unknown oils, cheese, or sour cream.  The items listed above may not be a complete list of foods and beverages to avoid. Contact your dietitian for more information.     This information is not intended to replace advice given to you by your health care provider. Make sure you discuss any questions you have with your health care provider.     Document Released: 06/18/2013 Document Revised: 01/08/2016 Document  Reviewed: 06/11/2015  [employer] Interactive Patient Education ©2016 [employer].            SYMPTOMS OF A STROKE    Call 911 or have someone take you to the Emergency Department if you have any of the following:    · Sudden numbness or weakness of your face, arm or leg especially on one side of the body  · Sudden confusion, diffiiculty speaking or trouble understanding   · Changes in your vision or loss of sight in one eye  · Sudden severe headache with no known cause  · sudden dizziness, trouble walking, loss of balance or coordination    It is important to seek emergency care right away if you have further stroke symptoms. If you get emergency help quickly, the powerful clot-dissolving medicines can reduce the disabilities caused by a stroke.     For more information:    American Stroke Association  8-370-1-STROKE  www.strokeassociation.org           IF YOU SMOKE OR USE TOBACCO PLEASE READ THE FOLLOWING:    Why is smoking bad for me?  Smoking increases the risk of heart disease, lung disease, vascular disease, stroke, and cancer.     If you smoke, STOP!    If you would like more information on quitting smoking, please visit the [employer] website: [email or web address]   This link will provide additional resources including the QUIT line and the Beat the Pack support groups.     For more information:    American Cancer Society  [phone]    American Heart Association  1-886.259.1925               YOU ARE THE MOST IMPORTANT FACTOR IN YOUR RECOVERY.     Follow all instructions carefully.     I have reviewed my discharge instructions with my nurse, including the following information, if applicable:     Information about my illness and diagnosis   Follow up appointments (including lab draws)   Wound Care   Equipment Needs   Medications (new and continuing) along with side effects   Preventative information such as vaccines and smoking cessations   Diet   Pain   I  know when to contact my Doctor's office or seek emergency care      I want my nurse to describe the side effects of my medications: YES NO   If the answer is no, I understand the side effects of my medications: YES NO   My nurse described the side effects of my medications in a way that I could understand: YES NO   I have taken my personal belongings and my own medications with me at discharge: YES NO            I have received this information and my questions have been answered. I have discussed any concerns I see with this plan with the nurse or physician. I understand these instructions.    Signature of Patient or Responsible Person: _____________________________________    Date: _________________  Time: __________________    Signature of Healthcare Provider: _______________________________________  Date: _________________  Time: __________________

## 2017-01-10 NOTE — TELEPHONE ENCOUNTER
01/10/17  10:30 AM  Carlo Archer  1945    Home Phone [phone]   Mobile [phone]     Pt's wife calls to report that he started with an irregular heart rhythm about 20 minutes ago. They checked his BP and HR with a device that stated that the pt was in Afib; /107 with . The pt is experiencing a mild pain on the left side of his chest. There is no radiation to arms, neck, back, or jaws. The pt is not SOA, feeling faint, nauseous, or diaphoretic.     This morning he took a baby ASA and 320mg diovan.    Do you want pt to come in for any testing? He was last seen by Dr. Montero in June and had a normal stress test and echo at that time.    Delia KARIMI RN

## 2017-01-10 NOTE — H&P
Patient Name: Carlo Archer  :1945  71 y.o.    Date of Admission: 1/10/2017  Date of Consultation:  01/10/17  Encounter Provider: Keo Montero MD  Place of Service: [facility] CARDIOLOGY  Referring Provider: No ref. provider found  Patient Care Team:  Jatin Vergara MD as PCP - General (Internal Medicine)      Chief complaint: SOA    History of Present Illness:    69-year-old gentleman with history of coronary artery disease who presents to the emergency room after having episodes of shortness of breath and fatigue.  He has been under enormous amount of stress.  I saw him back this summer was worked up with a stress test and echocardiogram.  About 940 this morning he started feeling some palpitations and initially had a little chest tightness with it that resolved.  He went to check his blood pressure was elevated when a 2/7 his heart rate was 109.  He has a very good friend has history of atrial fibrillation.  He subsequently went to the emergency room for evaluation and was found to be in atrial fibrillation.  Patient does admit he's been under enormous stress recently not sleeping well.  He also stopped taking NyQuil for sleep and was drinking alcohol instead.  Patient has never had a history of atrial fibrillation  Echo 16  · Left ventricular function is normal. Calculated EF = 57.8%. Estimated EF was in agreement with the calculated EF. Normal left ventricular cavity size and wall thickness noted. All left ventricular wall segments contract normally.  · Left ventricular diastolic function is normal.  · No valvular stenosis or insufficiency noted.  Stress test 16  · Myocardial perfusion imaging indicates a normal myocardial perfusion study with no evidence of ischemia.  · Left ventricular ejection fraction is normal (Calculated EF = 58%).  · Impressions are consistent with a low risk study.  Past Medical History   Diagnosis Date   • CAD (coronary  artery disease)    • Hypertension        Past Surgical History   Procedure Laterality Date   • Coronary angioplasty     • Vasectomy     • Tonsillectomy     • Joint replacement Right      hip         Prior to Admission medications    Medication Sig Start Date End Date Taking? Authorizing Provider   aspirin 81 MG tablet Take  by mouth. 12/3/13  Yes Historical Provider, MD   atorvastatin (LIPITOR) 40 MG tablet Take one tablet qhs. MUST MAKE A FOLLOW UP AND GET CHOLESTEROL LABS PRIOR TO ANY FURTHER REFILLS OR A 90 DAY SUPPLY 6/7/16  Yes Keo Montero MD   bimatoprost (LUMIGAN) 0.03 % ophthalmic drops Apply  to eye. 8/21/12  Yes Historical Provider, MD   BIOTIN PO Take  by mouth. 1/6/15  Yes Historical Provider, MD   Cyanocobalamin (VITAMIN B-12 PO) Take  by mouth. 1/6/15  Yes Historical Provider, MD   EPINEPHrine (EPIPEN 2-MARY) 0.3 MG/0.3ML solution auto-injector injection EpiPen TRINY; Patient Sig: EpiPen TRINY ; 0; 06-Jan-2015; Active 1/6/15  Yes Historical Provider, MD   esomeprazole (NEXIUM) 40 MG capsule Take  by mouth daily. 8/21/12  Yes Historical Provider, MD   finasteride (PROSCAR) 5 MG tablet Take  by mouth daily. 1/6/15  Yes Historical Provider, MD   fluticasone (FLONASE) 50 MCG/ACT nasal spray into each nostril. 1/6/15  Yes Historical Provider, MD   loratadine (CLARITIN) 10 MG tablet Take  by mouth. 8/21/12  Yes Historical Provider, MD   meloxicam (MOBIC) 15 MG tablet Take  by mouth daily. 8/21/12  Yes Historical Provider, MD   NIACIN PO Take  by mouth. 8/21/12  Yes Historical Provider, MD   tadalafil (CIALIS) 5 MG tablet Take  by mouth. 8/21/12  Yes Historical Provider, MD   tamsulosin (FLOMAX) 0.4 MG capsule 24 hr capsule Take  by mouth. 8/21/12  Yes Historical Provider, MD   valsartan (DIOVAN) 320 MG tablet take 1 tablet by mouth once daily 1/3/17  Yes Keo Montero MD   amLODIPine (NORVASC) 5 MG tablet Take 1 tablet by mouth daily. 6/7/16   Keo Montero MD       Allergies   Allergen  "Reactions   • Penicillins Anaphylaxis   • Streptomycin Anaphylaxis   • Bee Venom        Social History     Social History   • Marital status:      Spouse name: N/A   • Number of children: N/A   • Years of education: N/A     Social History Main Topics   • Smoking status: Former Smoker   • Smokeless tobacco: None   • Alcohol use Yes      Comment: occasional   • Drug use: No   • Sexual activity: Yes     Other Topics Concern   • None     Social History Narrative   • None       Family History   Problem Relation Age of Onset   • Heart attack Father    • Hypertension Sister    • Heart disease Sister    • Sudden death Brother        REVIEW OF SYSTEMS:   All systems reviewed.  Pertinent positives identified in HPI.  All other systems are negative.      Objective:     Vitals:    01/10/17 1236 01/10/17 1243 01/10/17 1249 01/10/17 1331   BP:  137/99  140/97   BP Location:    Left arm   Patient Position:    Lying   Pulse: 107 111 116 108   Resp:    20   Temp:    97.5 °F (36.4 °C)   TempSrc:    Oral   SpO2: 95% 95% 95% 95%   Weight:    208 lb (94.3 kg)   Height:    68\" (172.7 cm)     Body mass index is 31.63 kg/(m^2).    General Appearance:    Alert, cooperative, in no acute distress   Head:    Normocephalic, without obvious abnormality, atraumatic   Eyes:            Lids and lashes normal, conjunctivae and sclerae normal, no   icterus, no pallor,   Ears:    Ears appear intact with no abnormalities noted   Throat:   No oral lesions, no thrush, oral mucosa moist   Neck:   No adenopathy, supple, trachea midline, no thyromegaly, no   carotid bruit, no JVD   Back:     No kyphosis present, no scoliosis present, no skin lesions, erythema or scars, no tenderness to percussion or palpation, range of motion normal   Lungs:     Clear to auscultation,respirations regular, even and unlabored    Heart:    irregular rhythm and normal rate, normal S1 and S2, no murmur, no gallop, no rub, no click   Chest Wall:    No abnormalities " observed   Abdomen:     Normal bowel sounds, no masses, no organomegaly, soft        non-tender, non-distended, no guarding, no rebound  tenderness   Extremities:   Moves all extremities well, no edema, no cyanosis, no redness   Pulses:   Pulses palpable and equal bilaterally. Normal radial, carotid, femoral, dorsalis pedis and posterior tibial pulses bilaterally. Normal abdominal aorta   Skin:  Psychiatric:   No bleeding, bruising or rash    Alert and oriented x 3, normal mood and affect   Lab Review:       Results from last 7 days  Lab Units 01/10/17  1127   SODIUM mmol/L 143   POTASSIUM mmol/L 3.6   CHLORIDE mmol/L 103   TOTAL CO2 mmol/L 24.6   BUN mg/dL 20   CREATININE mg/dL 0.97   CALCIUM mg/dL 9.6   BILIRUBIN mg/dL 0.7   ALK PHOS U/L 60   ALT (SGPT) U/L 19   AST (SGOT) U/L 22   GLUCOSE mg/dL 120*       Results from last 7 days  Lab Units 01/10/17  1127   TROPONIN T ng/mL <0.010       Results from last 7 days  Lab Units 01/10/17  1127   WBC 10*3/mm3 6.50   HEMOGLOBIN g/dL 15.2   HEMATOCRIT % 45.8   PLATELETS 10*3/mm3 179       Results from last 7 days  Lab Units 01/10/17  1127   INR  1.09   APTT seconds 32.8       Results from last 7 days  Lab Units 01/10/17  1127   MAGNESIUM mg/dL 1.9                    I personally viewed and interpreted the patient's EKG/Telemetry data.        Assessment and Plan:       1.  New-onset atrial fibrillation.  His blood work is unremarkable.  I work him up with an echocardiogram to make sure his LV function hasn't changed.  If it has not been treated medically.  I think the etiology is stress, alcohol, sleep cycle messed up.  His wife confirms he does not have apneas like symptoms or snores significantly.  2.  History of coronary disease to have some brief episode chest discomfort I don't think this is clinically significant will check troponins.    Keo Montero MD  01/10/17  4:08 PM

## 2017-01-10 NOTE — TELEPHONE ENCOUNTER
I spoke with Dr. Montero about this pt who agreed to see him in office today at 1315. I called Mrs. Archer back and she states they are on their way to ER. I notified Dr. Montero who says for pt to proceed to ER - tmm.

## 2017-01-10 NOTE — PLAN OF CARE
Problem: Patient Care Overview (Adult)  Goal: Plan of Care Review  Outcome: Ongoing (interventions implemented as appropriate)    01/10/17 5072   Coping/Psychosocial Response Interventions   Plan Of Care Reviewed With patient;spouse   Patient Care Overview   Progress improving   Outcome Evaluation   Outcome Summary/Follow up Plan pt admitted to the floor today. pt has no complaints of discomfort or pain. lopressor 5mg X 2 doses were given. Echo was completed, awaiting results. RN will continue to monitor pt status.       Goal: Adult Individualization and Mutuality  Outcome: Ongoing (interventions implemented as appropriate)    Problem: Arrhythmia/Dysrhythmia (Symptomatic) (Adult)  Goal: Signs and Symptoms of Listed Potential Problems Will be Absent or Manageable (Arrhythmia/Dysrhythmia)  Outcome: Ongoing (interventions implemented as appropriate)

## 2017-01-10 NOTE — ED PROVIDER NOTES
" EMERGENCY DEPARTMENT ENCOUNTER    CHIEF COMPLAINT  Chief Complaint: irregular heart rhythm  History given by: patient  History limited by: nothing  Room Number: 2214/1  PMD: Jatin Vergara MD  Cardiologist: Dr. Montero    HPI:  Pt is a 71 y.o. male with hx of CAD and HTN, who presents complaining of irregular heart rhythm (\"just doesn't feel right\") onset 0940 this morning. Pt reports he took 81 mg ASA and 320 mg Diovan this morning. Pt denies nausea, vomiting, dizziness, syncope, SOA, and CP. He denies hx of arrhythmias.     Duration:  Two hours  Onset: gradual  Timing: constant  Quality: \"doesn't feel right\"  Intensity/Severity: moderate  Progression: unchanged  Associated Symptoms: none  Aggravating Factors: none  Alleviating Factors: none  Previous Episodes: none mentioned  Treatment before arrival: 81 mg ASA and 320 mg Diovan this morning.    PAST MEDICAL HISTORY  Active Ambulatory Problems     Diagnosis Date Noted   • No Active Ambulatory Problems     Resolved Ambulatory Problems     Diagnosis Date Noted   • No Resolved Ambulatory Problems     Past Medical History   Diagnosis Date   • CAD (coronary artery disease)    • Hypertension        PAST SURGICAL HISTORY  Past Surgical History   Procedure Laterality Date   • Coronary angioplasty     • Vasectomy     • Tonsillectomy     • Joint replacement Right      hip       FAMILY HISTORY  Family History   Problem Relation Age of Onset   • Heart attack Father    • Hypertension Sister    • Heart disease Sister    • Sudden death Brother        SOCIAL HISTORY  Social History     Social History   • Marital status:      Spouse name: N/A   • Number of children: N/A   • Years of education: N/A     Occupational History   • Not on file.     Social History Main Topics   • Smoking status: Former Smoker   • Smokeless tobacco: Not on file   • Alcohol use Yes      Comment: occasional   • Drug use: No   • Sexual activity: Yes     Other Topics Concern   • Not on file " "    Social History Narrative   • No narrative on file       ALLERGIES  Penicillins; Streptomycin; and Bee venom    REVIEW OF SYSTEMS  Review of Systems   Constitutional: Negative for activity change, appetite change and fever.   HENT: Negative for congestion and sore throat.    Eyes: Negative.    Respiratory: Negative for cough and shortness of breath.    Cardiovascular: Positive for palpitations (\"doesn't feel right\" ). Negative for chest pain and leg swelling.   Gastrointestinal: Negative for abdominal pain, diarrhea and vomiting.   Endocrine: Negative.    Genitourinary: Negative for decreased urine volume and dysuria.   Musculoskeletal: Negative for neck pain.   Skin: Negative for rash and wound.   Allergic/Immunologic: Negative.    Neurological: Negative for weakness, numbness and headaches.   Hematological: Negative.    Psychiatric/Behavioral: Negative.    All other systems reviewed and are negative.      PHYSICAL EXAM  ED Triage Vitals   Temp Heart Rate Resp BP SpO2   01/10/17 1108 01/10/17 1108 01/10/17 1108 01/10/17 1123 01/10/17 1108   97.1 °F (36.2 °C) 149 16 116/89 96 %      Temp src Heart Rate Source Patient Position BP Location FiO2 (%)   01/10/17 1108 01/10/17 1108 01/10/17 1123 01/10/17 1123 --   Tympanic Monitor Lying Right arm        Physical Exam   Constitutional: He is oriented to person, place, and time and well-developed, well-nourished, and in no distress.   HENT:   Head: Normocephalic and atraumatic.   Eyes: EOM are normal. Pupils are equal, round, and reactive to light.   Neck: Normal range of motion. Neck supple. No JVD present. Carotid bruit is not present.   Cardiovascular: Normal heart sounds.  An irregular rhythm present. Tachycardia present.    HR is 162   Pulmonary/Chest: Effort normal and breath sounds normal. No respiratory distress.   Abdominal: Soft. There is no tenderness. There is no rebound and no guarding.   Musculoskeletal: Normal range of motion. He exhibits no edema. "   Neurological: He is alert and oriented to person, place, and time. He has normal sensation and normal strength.   Skin: Skin is warm and dry.   Psychiatric: Mood and affect normal.   Nursing note and vitals reviewed.      LAB RESULTS  Lab Results (last 24 hours)     Procedure Component Value Units Date/Time    CBC & Differential [[id number]] Collected:  01/10/17 1127    Specimen:  Blood Updated:  01/10/17 1138    Narrative:       The following orders were created for panel order CBC & Differential.  Procedure                               Abnormality         Status                     ---------                               -----------         ------                     CBC Auto Differential[[id number]]         Normal              Final result                 Please view results for these tests on the individual orders.    Comprehensive Metabolic Panel [[id number]]  (Abnormal) Collected:  01/10/17 1127    Specimen:  Blood Updated:  01/10/17 1202     Glucose 120 (H) mg/dL      BUN 20 mg/dL      Creatinine 0.97 mg/dL      Sodium 143 mmol/L      Potassium 3.6 mmol/L      Chloride 103 mmol/L      CO2 24.6 mmol/L      Calcium 9.6 mg/dL      Total Protein 7.5 g/dL      Albumin 4.80 g/dL      ALT (SGPT) 19 U/L      AST (SGOT) 22 U/L      Alkaline Phosphatase 60 U/L      Total Bilirubin 0.7 mg/dL      eGFR Non African Amer 76 mL/min/1.73      Globulin 2.7 gm/dL      A/G Ratio 1.8 g/dL      BUN/Creatinine Ratio 20.6      Anion Gap 15.4 mmol/L     Narrative:       The MDRD GFR formula is only valid for adults with stable renal function between ages 18 and 70.    Troponin [[id number]]  (Normal) Collected:  01/10/17 1127    Specimen:  Blood Updated:  01/10/17 1207     Troponin T <0.010 ng/mL     Narrative:       Troponin T Reference Ranges:  Less than 0.03 ng/mL:    Negative for AMI  0.03 to 0.09 ng/mL:      Indeterminant for AMI  Greater than 0.09 ng/mL: Positive for AMI    CBC Auto Differential [[id number]]  (Normal) Collected:   01/10/17 1127    Specimen:  Blood Updated:  01/10/17 1138     WBC 6.50 10*3/mm3      RBC 4.90 10*6/mm3      Hemoglobin 15.2 g/dL      Hematocrit 45.8 %      MCV 93.5 fL      MCH 31.0 pg      MCHC 33.2 g/dL      RDW 13.1 %      RDW-SD 44.9 fl      MPV 9.9 fL      Platelets 179 10*3/mm3      Neutrophil % 58.6 %      Lymphocyte % 32.5 %      Monocyte % 5.7 %      Eosinophil % 2.3 %      Basophil % 0.9 %      Immature Grans % 0.0 %      Neutrophils, Absolute 3.81 10*3/mm3      Lymphocytes, Absolute 2.11 10*3/mm3      Monocytes, Absolute 0.37 10*3/mm3      Eosinophils, Absolute 0.15 10*3/mm3      Basophils, Absolute 0.06 10*3/mm3      Immature Grans, Absolute 0.00 10*3/mm3     Protime-INR [[id number]]  (Normal) Collected:  01/10/17 1127    Specimen:  Blood Updated:  01/10/17 1236     Protime 13.7 Seconds      INR 1.09     aPTT [[id number]]  (Normal) Collected:  01/10/17 1127    Specimen:  Blood Updated:  01/10/17 1236     PTT 32.8 seconds     Magnesium [[id number]]  (Normal) Collected:  01/10/17 1127    Specimen:  Blood Updated:  01/10/17 1202     Magnesium 1.9 mg/dL     T4, Free [[id number]]  (Normal) Collected:  01/10/17 1127    Specimen:  Blood Updated:  01/10/17 1208     Free T4 1.16 ng/dL     TSH [[id number]]  (Normal) Collected:  01/10/17 1127    Specimen:  Blood Updated:  01/10/17 1207     TSH 2.320 mIU/mL     Troponin [[id number]]  (Normal) Collected:  01/10/17 1611    Specimen:  Blood Updated:  01/10/17 1700     Troponin T <0.010 ng/mL     Narrative:       Troponin T Reference Ranges:  Less than 0.03 ng/mL:    Negative for AMI  0.03 to 0.09 ng/mL:      Indeterminant for AMI  Greater than 0.09 ng/mL: Positive for AMI          I ordered the above labs and reviewed the results    RADIOLOGY  XR Chest 1 View   FINDINGS: Benign calcified granuloma left mid lung zone. The right lung  is clear. The heart size normal. Mediastinum is satisfactory in  appearance.     There is a linear opacity at the left lung base suggesting  peribronchial  interstitial thickening (bronchitis) or minimal patchy infiltrate. No  pleural effusion or pulmonary edema is demonstrated. The remainder is  unremarkable.     This report was finalized on 1/10/2017 12:26 PM by Dr. Kenny Coe MD.           I ordered the above noted radiological studies. Interpreted by radiologist. Reviewed by me in PACS.       PROCEDURES  Critical Care  Performed by: PERLA CR  Authorized by: PERLA CR   Total critical care time: 30 minutes  Critical care time was exclusive of separately billable procedures and treating other patients.  Critical care was necessary to treat or prevent imminent or life-threatening deterioration of the following conditions: cardiac failure.  Critical care was time spent personally by me on the following activities: blood draw for specimens, development of treatment plan with patient or surrogate, discussions with consultants, interpretation of cardiac output measurements, examination of patient, obtaining history from patient or surrogate, ordering and performing treatments and interventions, ordering and review of laboratory studies, ordering and review of radiographic studies, pulse oximetry, re-evaluation of patient's condition and review of old charts.          EKG  EKG time: 11:13 AM   Rhythm/Rate: A-fib, 140 BPM  P waves and AL: nml  QRS, axis: nml   ST and T waves: lateral ST depression     Interpreted Contemporaneously by me, independently viewed  changed compared to prior (12/4/2007)    PROGRESS AND CONSULTS  ED Course     11:39 AM: Ordered TSH, T4, Mg, aPTT, PT-INR, and CXR for further evaluation. Ordered cardizem for HR    12:36 PM: On monitor, pt is rate controlled but has not converted to NSR. Placed call to [facility] for consult    12:48 PM:  Discussed pt's case with Dr. Montero (Cardiologist), who agrees to admit pt to telemetry-obs    MEDICAL DECISION MAKING  Results were reviewed/discussed with the patient and they were also  made aware of online access. Pt also made aware that some labs, such as cultures, will not be resulted during ER visit and follow up with PMD is necessary.     MDM  Number of Diagnoses or Management Options  New onset atrial fibrillation with RVR:      Amount and/or Complexity of Data Reviewed  Clinical lab tests: reviewed and ordered (Troponin=<0.01)  Tests in the radiology section of CPT®: reviewed and ordered (CXR - see dictated report)  Tests in the medicine section of CPT®: reviewed and ordered (See EKG procedure note)  Decide to obtain previous medical records or to obtain history from someone other than the patient: yes  Review and summarize past medical records: yes (In 06/2016, pt had stress test and echo. Stress test was negative for ischemia and showed an EF 58%. )  Discuss the patient with other providers: yes (Dr. Montero (Cardiologist) agrees to admit patient. )  Independent visualization of images, tracings, or specimens: yes           DIAGNOSIS  Final diagnoses:   New onset atrial fibrillation with RVR       DISPOSITION  ADMISSION    Discussed treatment plan and reason for admission with pt/family and admitting physician.  Pt/family voiced understanding of the plan for admission for further testing/treatment as needed.       Latest Documented Vital Signs:  As of 7:42 PM  BP- 111/84 HR- 50 Temp- 97.4 °F (36.3 °C) (Oral) O2 sat- 95%    --  Documentation assistance provided by randall Asif for Dr. Chaudhry.  Information recorded by the scribe was done at my direction and has been verified and validated by me.     Bennett Asif  01/10/17 1688       Dhruv Chaudhry MD  01/10/17 9366

## 2017-01-11 VITALS
HEART RATE: 48 BPM | TEMPERATURE: 98.2 F | RESPIRATION RATE: 20 BRPM | SYSTOLIC BLOOD PRESSURE: 153 MMHG | HEIGHT: 68 IN | BODY MASS INDEX: 31.52 KG/M2 | WEIGHT: 208 LBS | OXYGEN SATURATION: 94 % | DIASTOLIC BLOOD PRESSURE: 95 MMHG

## 2017-01-11 LAB — GLUCOSE BLDC GLUCOMTR-MCNC: 86 MG/DL (ref 70–130)

## 2017-01-11 PROCEDURE — G0378 HOSPITAL OBSERVATION PER HR: HCPCS

## 2017-01-11 PROCEDURE — 93005 ELECTROCARDIOGRAM TRACING: CPT | Performed by: INTERNAL MEDICINE

## 2017-01-11 PROCEDURE — 99217 PR OBSERVATION CARE DISCHARGE MANAGEMENT: CPT | Performed by: INTERNAL MEDICINE

## 2017-01-11 PROCEDURE — 82962 GLUCOSE BLOOD TEST: CPT

## 2017-01-11 PROCEDURE — 93010 ELECTROCARDIOGRAM REPORT: CPT | Performed by: INTERNAL MEDICINE

## 2017-01-11 RX ORDER — METOPROLOL SUCCINATE 25 MG/1
12.5 TABLET, EXTENDED RELEASE ORAL DAILY
Qty: 30 TABLET | Refills: 11 | Status: SHIPPED | OUTPATIENT
Start: 2017-01-11 | End: 2017-02-08 | Stop reason: SDUPTHER

## 2017-01-11 RX ADMIN — METOPROLOL SUCCINATE 25 MG: 25 TABLET, FILM COATED, EXTENDED RELEASE ORAL at 08:50

## 2017-01-11 RX ADMIN — VALSARTAN 320 MG: 320 TABLET, FILM COATED ORAL at 08:50

## 2017-01-11 NOTE — DISCHARGE SUMMARY
Hospital Discharge    Patient Name: Carlo Archer  Age/Sex: 71 y.o. male  : 1945  MRN: [id number]    Encounter Provider: Keo Montero MD  Referring Provider: No ref. provider found  Place of Service: [facility] CARDIOLOGY  Patient Care Team:  Jatin Vergara MD as PCP - General (Internal Medicine)         Date of Discharge:  2017   Date of Admit: 1/10/2017    Discharge Condition: Good  Discharge Diagnosis:  Active Problems:    New onset atrial fibrillation      Hospital Course:   Carlo Archer is a 71 y.o. male who presented with new onset atrial fibrillation.  This is probably secondary to stress and alcohol intake.  Repeat echocardiogram showed moderate atrial enlargement with no other abnormalities.  Patient converted on his own overnight.  We'll discharge on a low-dose beta blocker.  His heart rate did get down in the 40s while he was asleep is asymptomatic.  I therefore backed off on the dose of his beta blocker.  Patient to follow-up in 4 weeks.     Objective:  Temp:  [97.1 °F (36.2 °C)-98.2 °F (36.8 °C)] 98.2 °F (36.8 °C)  Heart Rate:  [] 48  Resp:  [16-20] 20  BP: (110-153)/(55-99) 153/95  No intake or output data in the 24 hours ending 17 0828  Body mass index is 31.63 kg/(m^2).  Last 3 weights    01/10/17  1123 01/10/17  1331   Weight: 205 lb (93 kg) 208 lb (94.3 kg)     Weight change:     Physical Exam:  CV regular rhythm   lungs clear   abdomen soft nontender   extremities normal no edema  Neuro intact  Procedures Performed         Consults:  Consults     Date and Time Order Name Status Description    1/10/2017 1236 LCG (on-call MD unless specified) Completed           Pertinent Test Results:    Results from last 7 days  Lab Units 01/10/17  1127   SODIUM mmol/L 143   POTASSIUM mmol/L 3.6   CHLORIDE mmol/L 103   TOTAL CO2 mmol/L 24.6   BUN mg/dL 20   CREATININE mg/dL 0.97   GLUCOSE mg/dL 120*   CALCIUM mg/dL 9.6   AST (SGOT) U/L 22   ALT  (SGPT) U/L 19       Results from last 7 days  Lab Units 01/10/17  1611 01/10/17  1127   TROPONIN T ng/mL <0.010 <0.010       Results from last 7 days  Lab Units 01/10/17  1127   WBC 10*3/mm3 6.50   HEMOGLOBIN g/dL 15.2   HEMATOCRIT % 45.8   PLATELETS 10*3/mm3 179       Results from last 7 days  Lab Units 01/10/17  1127   INR  1.09   APTT seconds 32.8       Results from last 7 days  Lab Units 01/10/17  1127   MAGNESIUM mg/dL 1.9               Results from last 7 days  Lab Units 01/10/17  1127   TSH mIU/mL 2.320       Discharge Medications   Carlo Archer   Home Medication Instructions LAVERN:[id number]    Printed on:01/11/17 0828   Medication Information                      aspirin 81 MG tablet  Take  by mouth.             atorvastatin (LIPITOR) 40 MG tablet  Take one tablet qhs. MUST MAKE A FOLLOW UP AND GET CHOLESTEROL LABS PRIOR TO ANY FURTHER REFILLS OR A 90 DAY SUPPLY             bimatoprost (LUMIGAN) 0.03 % ophthalmic drops  Apply  to eye.             BIOTIN PO  Take  by mouth.             Cyanocobalamin (VITAMIN B-12 PO)  Take  by mouth.             EPINEPHrine (EPIPEN 2-MARY) 0.3 MG/0.3ML solution auto-injector injection  EpiPen TRINY; Patient Sig: Amairani AGOSTO ; 0; 06-Jan-2015; Active             esomeprazole (NEXIUM) 40 MG capsule  Take  by mouth daily.             finasteride (PROSCAR) 5 MG tablet  Take  by mouth daily.             fluticasone (FLONASE) 50 MCG/ACT nasal spray  into each nostril.             loratadine (CLARITIN) 10 MG tablet  Take  by mouth.             meloxicam (MOBIC) 15 MG tablet  Take  by mouth daily.             metoprolol succinate XL (TOPROL-XL) 25 MG 24 hr tablet  Take 0.5 tablets by mouth Daily.             NIACIN PO  Take  by mouth.             tadalafil (CIALIS) 5 MG tablet  Take  by mouth.             tamsulosin (FLOMAX) 0.4 MG capsule 24 hr capsule  Take  by mouth.             valsartan (DIOVAN) 320 MG tablet  take 1 tablet by mouth once daily                 Discharge Diet:          Dietary Orders            Start     Ordered    01/10/17 1515  Diet Regular; Cardiac  Diet Effective Now     Question Answer Comment   Diet Texture / Consistency Regular    Common Modifiers Cardiac        01/10/17 1516          Activity at Discharge:       Discharge disposition: home     Discharge Instructions and Follow ups:  No future appointments.      Test Results Pending at Discharge:      Keo Montero MD  01/11/17  8:28 AM    Time: 20 min  EMR Dragon/Transcription disclaimer:   Much of this encounter note is an electronic transcription/translation of spoken language to printed text. The electronic translation of spoken language may permit erroneous, or at times, nonsensical words or phrases to be inadvertently transcribed; Although I have reviewed the note for such errors, some may still exist.

## 2017-01-11 NOTE — PLAN OF CARE
Problem: Arrhythmia/Dysrhythmia (Symptomatic) (Adult)  Goal: Signs and Symptoms of Listed Potential Problems Will be Absent or Manageable (Arrhythmia/Dysrhythmia)  Outcome: Ongoing (interventions implemented as appropriate)

## 2017-01-11 NOTE — PLAN OF CARE
Problem: Patient Care Overview (Adult)  Goal: Plan of Care Review  Outcome: Outcome(s) achieved Date Met:  01/11/17  Goal: Adult Individualization and Mutuality  Outcome: Outcome(s) achieved Date Met:  01/11/17  Goal: Discharge Needs Assessment  Outcome: Outcome(s) achieved Date Met:  01/11/17    Problem: Arrhythmia/Dysrhythmia (Symptomatic) (Adult)  Goal: Signs and Symptoms of Listed Potential Problems Will be Absent or Manageable (Arrhythmia/Dysrhythmia)  Outcome: Outcome(s) achieved Date Met:  01/11/17

## 2017-01-27 ENCOUNTER — TELEPHONE (OUTPATIENT)
Dept: CARDIOLOGY | Facility: CLINIC | Age: 72
End: 2017-01-27

## 2017-01-27 NOTE — TELEPHONE ENCOUNTER
The pt said he has been taking 12.5 mgs of metoprolol for 2 weeks and feels a little weird in his chest--notices it when he lays down at night. When I asked him about it he can best describe this as just an uncomfortable feeling. His heart rate has been 49-50 bpm,  and it was 58 bpm before the medication.  He wants to know if the dosage need to be changed?      Thanks,  Dunia

## 2017-02-08 ENCOUNTER — OFFICE VISIT (OUTPATIENT)
Dept: CARDIOLOGY | Facility: CLINIC | Age: 72
End: 2017-02-08

## 2017-02-08 VITALS
BODY MASS INDEX: 30.64 KG/M2 | SYSTOLIC BLOOD PRESSURE: 180 MMHG | HEIGHT: 70 IN | DIASTOLIC BLOOD PRESSURE: 92 MMHG | WEIGHT: 214 LBS | HEART RATE: 53 BPM

## 2017-02-08 DIAGNOSIS — I48.0 PAF (PAROXYSMAL ATRIAL FIBRILLATION) (HCC): Primary | ICD-10-CM

## 2017-02-08 PROCEDURE — 99214 OFFICE O/P EST MOD 30 MIN: CPT | Performed by: INTERNAL MEDICINE

## 2017-02-08 RX ORDER — METOPROLOL SUCCINATE 25 MG/1
25 TABLET, EXTENDED RELEASE ORAL DAILY
Qty: 30 TABLET | Refills: 11 | Status: SHIPPED | OUTPATIENT
Start: 2017-02-08 | End: 2017-04-27 | Stop reason: SDUPTHER

## 2017-02-15 PROCEDURE — 93000 ELECTROCARDIOGRAM COMPLETE: CPT | Performed by: INTERNAL MEDICINE

## 2017-02-15 NOTE — PROGRESS NOTES
Subjective:     Encounter Date:02/08/2017      Patient ID: Carlo Archer is a 72 y.o. male.    Chief Complaint:  Atrial Fibrillation   Presents for follow-up visit. Symptoms include shortness of breath. Symptoms are negative for chest pain, dizziness, hypotension, pacemaker problem, palpitations, syncope and tachycardia. The symptoms have been stable. Past medical history includes atrial fibrillation and CAD.   Coronary Artery Disease   Presents for follow-up visit. Symptoms include shortness of breath. Pertinent negatives include no chest pain, chest pressure, chest tightness, dizziness, leg swelling or palpitations. The symptoms have been stable.   Hypertension   This is a chronic problem. The current episode started more than 1 year ago. The problem is uncontrolled. Associated symptoms include anxiety, malaise/fatigue and shortness of breath. Pertinent negatives include no chest pain or palpitations.     Patient resents today for reevaluation.  Patient was found to be in atrial fibrillation.  He has been under enormous amount of stress recently which has persisted.  This unfortunately is not improving.  He denies chest discomforts.  Had some shortness of breath his blood pressure is doubly high today.  He was seen today for reevaluation after recent emergency room visit.      Review of Systems   Constitution: Positive for malaise/fatigue.   Cardiovascular: Negative for chest pain, leg swelling, palpitations and syncope.   Respiratory: Positive for shortness of breath and wheezing. Negative for chest tightness.    Musculoskeletal: Positive for joint pain.   Neurological: Negative for dizziness.   All other systems reviewed and are negative.        ECG 12 Lead  Date/Time: 2/15/2017 5:06 PM  Performed by: RODERICK PATEL  Authorized by: RODERICK PATEL   Comparison: compared with previous ECG from 6/7/2016  Similar to previous ECG  Rhythm: sinus bradycardia  Clinical impression: abnormal ECG                Objective:     Physical Exam   Constitutional: He is oriented to person, place, and time. He appears well-developed.   HENT:   Head: Normocephalic.   Eyes: Conjunctivae are normal.   Neck: Normal range of motion.   Cardiovascular: Normal rate, regular rhythm and normal heart sounds.    Pulmonary/Chest: Breath sounds normal.   Abdominal: Soft. Bowel sounds are normal.   Musculoskeletal: Normal range of motion. He exhibits no edema.   Neurological: He is alert and oriented to person, place, and time.   Skin: Skin is warm and dry.   Psychiatric: He has a normal mood and affect. His behavior is normal.   Vitals reviewed.      Lab Review:       Assessment:         No diagnosis found.       Plan:         1.  History of coronary artery disease clinically stable.  2.  Paroxysmal atrial fibrillation.  He remains on metoprolol and aspirin.  3.  Hypertension.  Blood pressure is elevated.  I did increase his beta blocker to control his blood pressure as well as his atrial fibrillation is paroxysmal.  4.  Patient is under enormous amount of stress which is always a contributing factor.  5.  Will follow his blood pressure home his daughter is a nurse.  He's getting a blood pressure cuff and report back if it doesn't improve I will see him back in 3 months.  Atrial Fibrillation and Atrial Flutter  Assessment  • The patient has paroxysmal atrial fibrillation  • This is non-valvular in etiology  • The patient's CHADS2-VASc score is 2  • A [id number] score of 2 or more is considered a high risk for a thromboembolic event    Plan  • Attempt to maintain sinus rhythm  • Continue aspirin for antithrombotic therapy, bleeding issues discussed  • Continue beta blocker for rhythm control

## 2017-04-27 RX ORDER — METOPROLOL SUCCINATE 25 MG/1
25 TABLET, EXTENDED RELEASE ORAL DAILY
Qty: 30 TABLET | Refills: 11 | Status: SHIPPED | OUTPATIENT
Start: 2017-04-27 | End: 2018-02-26 | Stop reason: SDUPTHER

## 2017-05-08 ENCOUNTER — OFFICE VISIT (OUTPATIENT)
Dept: CARDIOLOGY | Facility: CLINIC | Age: 72
End: 2017-05-08

## 2017-05-08 VITALS
WEIGHT: 209.8 LBS | BODY MASS INDEX: 30.03 KG/M2 | DIASTOLIC BLOOD PRESSURE: 94 MMHG | SYSTOLIC BLOOD PRESSURE: 152 MMHG | HEIGHT: 70 IN | HEART RATE: 51 BPM

## 2017-05-08 DIAGNOSIS — I48.0 PAF (PAROXYSMAL ATRIAL FIBRILLATION) (HCC): Primary | ICD-10-CM

## 2017-05-08 DIAGNOSIS — I25.10 CORONARY ARTERY DISEASE INVOLVING NATIVE CORONARY ARTERY OF NATIVE HEART WITHOUT ANGINA PECTORIS: ICD-10-CM

## 2017-05-08 PROCEDURE — 93000 ELECTROCARDIOGRAM COMPLETE: CPT | Performed by: INTERNAL MEDICINE

## 2017-05-08 PROCEDURE — 99214 OFFICE O/P EST MOD 30 MIN: CPT | Performed by: INTERNAL MEDICINE

## 2017-06-08 ENCOUNTER — TELEPHONE (OUTPATIENT)
Dept: CARDIOLOGY | Facility: CLINIC | Age: 72
End: 2017-06-08

## 2017-06-08 NOTE — TELEPHONE ENCOUNTER
This is a Dr. Montero patient.  He is out all week.  Thank you for addressing this msg.      Pt's wife called & said Mr. Archer has an appt with Dr. Montero on 06-22-17 but is concerned about some things and wants to make sure it's ok to wait.  He has pain in the left side of his neck, shoulder and arm which responds to tylenol that he is taking TID.  She said their daughter is a nurse and said he had an irregular heart rate of 56 bpm last night and his BP was 151/86.   They want to know if he needs to do anything differently with his medications until he is seen?    Pt can be reached by calling :    Mrs. Archer Cell # [phone]    Mr. Archer  Cell # [phone]      Thank you,  Dunia

## 2017-06-13 RX ORDER — ATORVASTATIN CALCIUM 40 MG/1
40 TABLET, FILM COATED ORAL NIGHTLY
Qty: 90 TABLET | Refills: 0 | Status: SHIPPED | OUTPATIENT
Start: 2017-06-13 | End: 2017-08-29 | Stop reason: SDUPTHER

## 2017-06-22 ENCOUNTER — OFFICE VISIT (OUTPATIENT)
Dept: CARDIOLOGY | Facility: CLINIC | Age: 72
End: 2017-06-22

## 2017-06-22 VITALS
SYSTOLIC BLOOD PRESSURE: 138 MMHG | HEIGHT: 70 IN | WEIGHT: 212 LBS | BODY MASS INDEX: 30.35 KG/M2 | HEART RATE: 50 BPM | DIASTOLIC BLOOD PRESSURE: 88 MMHG

## 2017-06-22 DIAGNOSIS — I48.0 PAF (PAROXYSMAL ATRIAL FIBRILLATION) (HCC): Primary | ICD-10-CM

## 2017-06-22 PROCEDURE — 99214 OFFICE O/P EST MOD 30 MIN: CPT | Performed by: INTERNAL MEDICINE

## 2017-06-22 PROCEDURE — 93000 ELECTROCARDIOGRAM COMPLETE: CPT | Performed by: INTERNAL MEDICINE

## 2017-06-22 NOTE — PROGRESS NOTES
"    Subjective:     Encounter Date:06/22/2017      Patient ID: Carlo Archer is a 72 y.o. male.    Chief Complaint:  Atrial Fibrillation   Presents for follow-up visit. Symptoms are negative for chest pain, hypertension, palpitations and shortness of breath. The symptoms have been stable. Past medical history includes atrial fibrillation and CAD.   Coronary Artery Disease   Presents for follow-up visit. Pertinent negatives include no chest pain, chest pressure, chest tightness, palpitations or shortness of breath. Risk factors do not include hypertension. The symptoms have been stable.   Hypertension   This is a chronic problem. The current episode started more than 1 year ago. Pertinent negatives include no chest pain, palpitations or shortness of breath.       Patient presents today for reevaluation.  Patient had an irregular heartbeat that was not his atrial fibrillation.  He will indication last week had a great vacation and did not have any experience of these.  His daughter who is a nurse listened and said it was irregular.  He, gets \"butterfly sensation\".    Review of Systems   Cardiovascular: Negative for chest pain and palpitations.   Respiratory: Positive for wheezing. Negative for chest tightness and shortness of breath.    All other systems reviewed and are negative.        ECG 12 Lead  Date/Time: 6/22/2017 4:26 PM  Performed by: RODERICK PATEL  Authorized by: RODERICK PATEL   Comparison: compared with previous ECG from 5/8/2017  Similar to previous ECG  Rhythm: sinus rhythm  Clinical impression: normal ECG               Objective:     Physical Exam   Constitutional: He is oriented to person, place, and time. He appears well-developed.   HENT:   Head: Normocephalic.   Eyes: Conjunctivae are normal.   Neck: Normal range of motion.   Cardiovascular: Normal rate, regular rhythm and normal heart sounds.    Pulmonary/Chest: Breath sounds normal.   Abdominal: Soft. Bowel sounds are normal. "   Musculoskeletal: Normal range of motion. He exhibits no edema.   Neurological: He is alert and oriented to person, place, and time.   Skin: Skin is warm and dry.   Psychiatric: He has a normal mood and affect. His behavior is normal.   Vitals reviewed.      Lab Review:       Assessment:         No diagnosis found.       Plan:       1.  I think he is describing PVCs.  I explained what they were at this point I would do no further workup especially since they went away.  2.  Paroxysmal A. fib doing well in normal rhythm.  3.  History of coronary artery disease he did have some neck discomfort and some shoulder discomfort.  Again at sun muscle skeletal and probably secondary to stress.  4.  Continue same follow-up 6-12 months    Atrial Fibrillation and Atrial Flutter  Assessment  • The patient has paroxysmal atrial fibrillation  • This is non-valvular in etiology  • The patient's CHADS2-VASc score is 2  • A [id number] score of 2 or more is considered a high risk for a thromboembolic event    Plan  • Attempt to maintain sinus rhythm  • Continue aspirin for antithrombotic therapy, bleeding issues discussed  • Continue beta blocker for rhythm control      Coronary Artery Disease  Assessment  • The patient has no angina    Plan  • Lifestyle modifications discussed include adhering to a heart healthy diet, avoidance of tobacco products, maintenance of a healthy weight, medication compliance, regular exercise and regular monitoring of cholesterol and blood pressure    Subjective - Objective  • Current antiplatelet therapy includes aspirin 81 mg

## 2017-07-05 RX ORDER — VALSARTAN 320 MG/1
TABLET ORAL
Qty: 90 TABLET | Refills: 1 | Status: SHIPPED | OUTPATIENT
Start: 2017-07-05 | End: 2017-11-27 | Stop reason: SDUPTHER

## 2017-08-09 ENCOUNTER — APPOINTMENT (OUTPATIENT)
Dept: GENERAL RADIOLOGY | Facility: HOSPITAL | Age: 72
End: 2017-08-09

## 2017-08-09 ENCOUNTER — HOSPITAL ENCOUNTER (EMERGENCY)
Facility: HOSPITAL | Age: 72
Discharge: HOME | End: 2017-08-09
Attending: EMERGENCY MEDICINE | Admitting: EMERGENCY MEDICINE

## 2017-08-09 VITALS
OXYGEN SATURATION: 96 % | WEIGHT: 202 LBS | BODY MASS INDEX: 28.92 KG/M2 | TEMPERATURE: 97.2 F | HEIGHT: 70 IN | DIASTOLIC BLOOD PRESSURE: 71 MMHG | SYSTOLIC BLOOD PRESSURE: 103 MMHG | RESPIRATION RATE: 16 BRPM | HEART RATE: 49 BPM

## 2017-08-09 DIAGNOSIS — I48.91 ATRIAL FIBRILLATION WITH RVR (HCC): Primary | ICD-10-CM

## 2017-08-09 LAB
ALBUMIN SERPL-MCNC: 4.3 G/DL (ref 3.5–5.2)
ALBUMIN/GLOB SERPL: 1.3 G/DL
ALP SERPL-CCNC: 58 U/L (ref 39–117)
ALT SERPL W P-5'-P-CCNC: 23 U/L (ref 1–41)
ANION GAP SERPL CALCULATED.3IONS-SCNC: 14.3 MMOL/L
AST SERPL-CCNC: 24 U/L (ref 1–40)
BASOPHILS # BLD AUTO: 0.05 10*3/MM3 (ref 0–0.2)
BASOPHILS NFR BLD AUTO: 0.7 % (ref 0–1.5)
BILIRUB SERPL-MCNC: 0.7 MG/DL (ref 0.1–1.2)
BUN BLD-MCNC: 20 MG/DL (ref 8–23)
BUN/CREAT SERPL: 23 (ref 7–25)
CALCIUM SPEC-SCNC: 9.2 MG/DL (ref 8.6–10.5)
CHLORIDE SERPL-SCNC: 104 MMOL/L (ref 98–107)
CO2 SERPL-SCNC: 24.7 MMOL/L (ref 22–29)
CREAT BLD-MCNC: 0.87 MG/DL (ref 0.76–1.27)
DEPRECATED RDW RBC AUTO: 44.2 FL (ref 37–54)
EOSINOPHIL # BLD AUTO: 0.21 10*3/MM3 (ref 0–0.7)
EOSINOPHIL NFR BLD AUTO: 2.9 % (ref 0.3–6.2)
ERYTHROCYTE [DISTWIDTH] IN BLOOD BY AUTOMATED COUNT: 13.1 % (ref 11.5–14.5)
GFR SERPL CREATININE-BSD FRML MDRD: 86 ML/MIN/1.73
GLOBULIN UR ELPH-MCNC: 3.2 GM/DL
GLUCOSE BLD-MCNC: 123 MG/DL (ref 65–99)
HCT VFR BLD AUTO: 45.5 % (ref 40.4–52.2)
HGB BLD-MCNC: 15.6 G/DL (ref 13.7–17.6)
HOLD SPECIMEN: NORMAL
HOLD SPECIMEN: NORMAL
IMM GRANULOCYTES # BLD: 0 10*3/MM3 (ref 0–0.03)
IMM GRANULOCYTES NFR BLD: 0 % (ref 0–0.5)
LYMPHOCYTES # BLD AUTO: 1.71 10*3/MM3 (ref 0.9–4.8)
LYMPHOCYTES NFR BLD AUTO: 23.7 % (ref 19.6–45.3)
MCH RBC QN AUTO: 32 PG (ref 27–32.7)
MCHC RBC AUTO-ENTMCNC: 34.3 G/DL (ref 32.6–36.4)
MCV RBC AUTO: 93.2 FL (ref 79.8–96.2)
MONOCYTES # BLD AUTO: 0.63 10*3/MM3 (ref 0.2–1.2)
MONOCYTES NFR BLD AUTO: 8.7 % (ref 5–12)
NEUTROPHILS # BLD AUTO: 4.61 10*3/MM3 (ref 1.9–8.1)
NEUTROPHILS NFR BLD AUTO: 64 % (ref 42.7–76)
PLATELET # BLD AUTO: 168 10*3/MM3 (ref 140–500)
PMV BLD AUTO: 10.2 FL (ref 6–12)
POTASSIUM BLD-SCNC: 4 MMOL/L (ref 3.5–5.2)
PROT SERPL-MCNC: 7.5 G/DL (ref 6–8.5)
RBC # BLD AUTO: 4.88 10*6/MM3 (ref 4.6–6)
SODIUM BLD-SCNC: 143 MMOL/L (ref 136–145)
TROPONIN T SERPL-MCNC: <0.01 NG/ML (ref 0–0.03)
WBC NRBC COR # BLD: 7.21 10*3/MM3 (ref 4.5–10.7)
WHOLE BLOOD HOLD SPECIMEN: NORMAL
WHOLE BLOOD HOLD SPECIMEN: NORMAL

## 2017-08-09 PROCEDURE — 99285 EMERGENCY DEPT VISIT HI MDM: CPT

## 2017-08-09 PROCEDURE — 85025 COMPLETE CBC W/AUTO DIFF WBC: CPT | Performed by: EMERGENCY MEDICINE

## 2017-08-09 PROCEDURE — 84484 ASSAY OF TROPONIN QUANT: CPT | Performed by: EMERGENCY MEDICINE

## 2017-08-09 PROCEDURE — 93005 ELECTROCARDIOGRAM TRACING: CPT | Performed by: EMERGENCY MEDICINE

## 2017-08-09 PROCEDURE — 93010 ELECTROCARDIOGRAM REPORT: CPT | Performed by: INTERNAL MEDICINE

## 2017-08-09 PROCEDURE — 96365 THER/PROPH/DIAG IV INF INIT: CPT

## 2017-08-09 PROCEDURE — 80053 COMPREHEN METABOLIC PANEL: CPT | Performed by: EMERGENCY MEDICINE

## 2017-08-09 PROCEDURE — 96376 TX/PRO/DX INJ SAME DRUG ADON: CPT

## 2017-08-09 PROCEDURE — 71020 HC CHEST PA AND LATERAL: CPT

## 2017-08-09 RX ORDER — SODIUM CHLORIDE 0.9 % (FLUSH) 0.9 %
10 SYRINGE (ML) INJECTION AS NEEDED
Status: DISCONTINUED | OUTPATIENT
Start: 2017-08-09 | End: 2017-08-09 | Stop reason: HOSPADM

## 2017-08-09 RX ORDER — DILTIAZEM HYDROCHLORIDE 5 MG/ML
10 INJECTION INTRAVENOUS ONCE
Status: COMPLETED | OUTPATIENT
Start: 2017-08-09 | End: 2017-08-09

## 2017-08-09 RX ORDER — ASPIRIN 325 MG
325 TABLET ORAL ONCE
Status: COMPLETED | OUTPATIENT
Start: 2017-08-09 | End: 2017-08-09

## 2017-08-09 RX ADMIN — ASPIRIN 325 MG: 325 TABLET ORAL at 09:34

## 2017-08-09 RX ADMIN — DILTIAZEM HYDROCHLORIDE 5 MG/HR: 100 INJECTION, POWDER, LYOPHILIZED, FOR SOLUTION INTRAVENOUS at 09:35

## 2017-08-09 RX ADMIN — DILTIAZEM HYDROCHLORIDE 10 MG: 5 INJECTION INTRAVENOUS at 09:41

## 2017-08-09 NOTE — ED NOTES
MD notified of change in heart rate and BP post medication admin.     Kade Castillo RN  08/09/17 0906

## 2017-08-09 NOTE — ED PROVIDER NOTES
"EMERGENCY DEPARTMENT ENCOUNTER       CHIEF COMPLAINT  Chief Complaint: Dizziness  History given by: Patient, Daughter  History limited by: N/A  Room Number: 40/40  PMD: Jatin Vergara MD      HPI:  HPI Comments: Pt with h/o A-Fib presents to the ED c/o dizziness described as \"lightheadedness\" which awoke pt from sleep at about 06:00 AM today. Pt reports that he has also had palpitations described as \"rapid\", diaphoresis, and mild chest pain (CP now resolved). Pt denies dyspnea, BLE edema, abd pain, and N/V/D. Per daughter, pt recently returned from a trip in which pt was in the car for about 4 hours (however, pt stopped multiple times and walked around during the drive). Pt reports that he is on 81 mg ASA, but is not on any other antiplatelets or anticoagulants.There are no other complaints at this time.     Dr. Montero - cardiologist     Patient is a 72 y.o. male presenting with dizziness.   Dizziness   Quality:  Lightheadedness  Severity:  Moderate  Onset quality:  Gradual  Duration: sx awoke pt from sleep at about 06:00 AM today.  Timing:  Constant  Progression:  Waxing and waning  Chronicity:  Recurrent (Pt has h/o A-Fib)  Context comment:  Pt has h/o A-Fib  Relieved by:  Nothing  Worsened by:  Nothing  Associated symptoms: chest pain (mild - now resolved) and palpitations (\"rapid\")    Associated symptoms: no diarrhea, no headaches, no nausea, no shortness of breath, no vomiting and no weakness          PAST MEDICAL HISTORY  Active Ambulatory Problems     Diagnosis Date Noted   • New onset atrial fibrillation 01/10/2017     Resolved Ambulatory Problems     Diagnosis Date Noted   • No Resolved Ambulatory Problems     Past Medical History:   Diagnosis Date   • CAD (coronary artery disease)    • Hypertension          PAST SURGICAL HISTORY  Past Surgical History:   Procedure Laterality Date   • CORONARY ANGIOPLASTY     • JOINT REPLACEMENT Right     hip   • TONSILLECTOMY     • VASECTOMY           FAMILY " "HISTORY  Family History   Problem Relation Age of Onset   • Heart attack Father    • Hypertension Sister    • Heart disease Sister    • Sudden death Brother          SOCIAL HISTORY  Social History     Social History   • Marital status:      Spouse name: N/A   • Number of children: N/A   • Years of education: N/A     Occupational History   • Not on file.     Social History Main Topics   • Smoking status: Former Smoker   • Smokeless tobacco: Not on file      Comment: caffeine use   • Alcohol use Yes      Comment: occasional   • Drug use: No   • Sexual activity: Yes     Other Topics Concern   • Not on file     Social History Narrative         ALLERGIES  Penicillins; Streptomycin; and Bee venom        REVIEW OF SYSTEMS  Review of Systems   Constitutional: Positive for diaphoresis. Negative for chills.   HENT: Negative for congestion, rhinorrhea and sore throat.    Eyes: Negative for pain.   Respiratory: Negative for cough and shortness of breath.    Cardiovascular: Positive for chest pain (mild - now resolved) and palpitations (\"rapid\"). Negative for leg swelling.   Gastrointestinal: Negative for abdominal pain, diarrhea, nausea and vomiting.   Genitourinary: Negative for difficulty urinating, dysuria, flank pain and frequency.   Musculoskeletal: Negative for myalgias, neck pain and neck stiffness.   Skin: Negative for rash.   Neurological: Positive for dizziness (lightheadedness) and light-headedness. Negative for speech difficulty, weakness, numbness and headaches.   Psychiatric/Behavioral: Negative.    All other systems reviewed and are negative.           PHYSICAL EXAM  ED Triage Vitals   Temp Heart Rate Resp BP SpO2   08/09/17 0908 08/09/17 0908 08/09/17 0908 08/09/17 0917 08/09/17 0908   97.2 °F (36.2 °C) 54 17 142/113 97 % WNL      Temp src Heart Rate Source Patient Position BP Location FiO2 (%)   08/09/17 0908 08/09/17 0908 08/09/17 0917 08/09/17 0917 --   Tympanic Monitor Lying Left arm        Physical " Exam   Constitutional: He is oriented to person, place, and time. No distress.   HENT:   Head: Normocephalic.   Mouth/Throat: Mucous membranes are normal.   Eyes: EOM are normal. Pupils are equal, round, and reactive to light.   Neck: Normal range of motion. Neck supple.   Cardiovascular: Normal heart sounds.  An irregular rhythm present. Tachycardia present.    Pulmonary/Chest: Effort normal and breath sounds normal. No respiratory distress. He has no decreased breath sounds. He has no wheezes. He has no rhonchi. He has no rales.   Abdominal: Soft. There is no tenderness. There is no rebound and no guarding.   Musculoskeletal: Normal range of motion.   No calf tenderness bilaterally. No BLE edema.   Neurological: He is alert and oriented to person, place, and time. He has normal sensation.   Skin: Skin is warm and dry.   Psychiatric: Mood and affect normal.   Nursing note and vitals reviewed.          LAB RESULTS  Recent Results (from the past 24 hour(s))   Lavender Top    Collection Time: 08/09/17  9:18 AM   Result Value Ref Range    Extra Tube hold for add-on    CBC Auto Differential    Collection Time: 08/09/17  9:18 AM   Result Value Ref Range    WBC 7.21 4.50 - 10.70 10*3/mm3    RBC 4.88 4.60 - 6.00 10*6/mm3    Hemoglobin 15.6 13.7 - 17.6 g/dL    Hematocrit 45.5 40.4 - 52.2 %    MCV 93.2 79.8 - 96.2 fL    MCH 32.0 27.0 - 32.7 pg    MCHC 34.3 32.6 - 36.4 g/dL    RDW 13.1 11.5 - 14.5 %    RDW-SD 44.2 37.0 - 54.0 fl    MPV 10.2 6.0 - 12.0 fL    Platelets 168 140 - 500 10*3/mm3    Neutrophil % 64.0 42.7 - 76.0 %    Lymphocyte % 23.7 19.6 - 45.3 %    Monocyte % 8.7 5.0 - 12.0 %    Eosinophil % 2.9 0.3 - 6.2 %    Basophil % 0.7 0.0 - 1.5 %    Immature Grans % 0.0 0.0 - 0.5 %    Neutrophils, Absolute 4.61 1.90 - 8.10 10*3/mm3    Lymphocytes, Absolute 1.71 0.90 - 4.80 10*3/mm3    Monocytes, Absolute 0.63 0.20 - 1.20 10*3/mm3    Eosinophils, Absolute 0.21 0.00 - 0.70 10*3/mm3    Basophils, Absolute 0.05 0.00 - 0.20  10*3/mm3    Immature Grans, Absolute 0.00 0.00 - 0.03 10*3/mm3   Comprehensive Metabolic Panel    Collection Time: 08/09/17  9:19 AM   Result Value Ref Range    Glucose 123 (H) 65 - 99 mg/dL    BUN 20 8 - 23 mg/dL    Creatinine 0.87 0.76 - 1.27 mg/dL    Sodium 143 136 - 145 mmol/L    Potassium 4.0 3.5 - 5.2 mmol/L    Chloride 104 98 - 107 mmol/L    CO2 24.7 22.0 - 29.0 mmol/L    Calcium 9.2 8.6 - 10.5 mg/dL    Total Protein 7.5 6.0 - 8.5 g/dL    Albumin 4.30 3.50 - 5.20 g/dL    ALT (SGPT) 23 1 - 41 U/L    AST (SGOT) 24 1 - 40 U/L    Alkaline Phosphatase 58 39 - 117 U/L    Total Bilirubin 0.7 0.1 - 1.2 mg/dL    eGFR Non African Amer 86 >60 mL/min/1.73    Globulin 3.2 gm/dL    A/G Ratio 1.3 g/dL    BUN/Creatinine Ratio 23.0 7.0 - 25.0    Anion Gap 14.3 mmol/L   Troponin    Collection Time: 08/09/17  9:19 AM   Result Value Ref Range    Troponin T <0.010 0.000 - 0.030 ng/mL   Light Blue Top    Collection Time: 08/09/17  9:19 AM   Result Value Ref Range    Extra Tube hold for add-on    Green Top (Gel)    Collection Time: 08/09/17  9:19 AM   Result Value Ref Range    Extra Tube Hold for add-ons.    Gold Top - SST    Collection Time: 08/09/17  9:19 AM   Result Value Ref Range    Extra Tube Hold for add-ons.        Ordered the above labs and reviewed the results.        RADIOLOGY  XR Chest 2 View   Final Result    1. Ill-defined opacification right lower lung on frontal view,  posteriorly on the lateral view question mild pneumonia versus chronic  change.  2. CT chest would be useful for more accurate assessment.    Interpreted by radiologist. Independently viewed by me.                Ordered the above noted radiological studies. Reviewed by me in PACS.       PROCEDURES  Procedures    EKG #1:           EKG time: 09:11 AM  Rhythm/Rate: A-Fib with rate 151  P waves and [place]: None present  QRS, axis: Normal QRS   ST and T waves: ST depression in II, III, aVF, V5, V6 (most likely rate-related)     Interpreted Contemporaneously  by me, independently viewed  changed compared to prior 01/11/17 (Pt was in sinus bradycardia with HR 48 on prior EKG)        EKG #2:         EKG time: 10:09 AM  Rhythm/Rate: Sinus bradycardia rate 54  P waves and [place]: Normal P waves  QRS, axis: Normal QRS   ST and T waves: Normal ST     Pt's HR on 01/11/17 was 48    Interpreted Contemporaneously by me, independently viewed  changed compared to prior EKG #1 (pt has converted out of A-Fib)             PROGRESS AND CONSULTS  ED Course   Comment By Time   11:22 AM  Patient with history of paroxysmal a fib here for rapid a fib.  Started on diltiazem and patient converted to NSR.  States he feels back to normal.  States his heart rate is always in the 50s.  Ambulates here without difficulty.  Discussed with Dr. De Santiago for Dr. Montero.  Will start on xarelto.  Will follow later this week.  Return for worsening symptoms.  Patient also had CXR that may have had pneumonia.  Patient has normal WBC, pulse ox, no symptoms of pneumonia. Marcos Souza MD 08/09 1124     9:23 AM:  Pt is in A-Fib with HR in the 150s. Cardizem drip and bolus ordered.     10:05 AM:  Per RN, pt has converted out of A-Fib after Cardizem drip. Repeat EKG ordered.     10:35 AM:  Rechecked pt. Pt has converted out of A-Fib and is currently in sinus bradycardia rate 50s. Pt reports that his dizziness and diaphoresis have resolved. Pt reports that he feels much better at this time. Informed pt that his troponin is negative. Will discuss further course of care with the cardiologist. Pt agrees with plan.    10:38 AM:  Placed call to Dr. Montero, cardiologist, to discuss further course of care.     11:14 AM:  Discussed case with Dr. De Santiago, cardiologist on-call for Dr. Montero. She would like pt started on Xarelto. Their group will f/u with pt in the office.    Per ER tech, pt ambulated in the ER independently without significant difficulty.     11:17 AM:  Rechecked pt. Pt is resting comfortably and  appears in no acute distress. Pt remains in sinus bradycardia with HR in the 50s. Informed pt of my d/w Dr. De Santiago, cardiologist on-call for Dr. Montero. Will start pt on Xarelto. Pt reports that he is on Metoprolol and his HR is in the 50s at baseline. Pt advised to f/u with cardiologist. RTER warnings given. Pt agrees with plan for discharge.    Reviewed pt's CXR results - pt denies dyspnea, cough, fever. Pt is afebrile in the ER. O2 sat is 95% on room air.     Pt reports that he feels significantly better in the ER. Pt reports that he has ambulated in the ER without significant difficulty.     Pt has received ASA in the ER.           MEDICAL DECISION MAKING      MDM  Number of Diagnoses or Management Options  Atrial fibrillation with RVR:      Amount and/or Complexity of Data Reviewed  Clinical lab tests: ordered and reviewed (Troponin is negative.)  Tests in the radiology section of CPT®: ordered and reviewed (CXR: 1. Ill-defined opacification right lower lung on frontal view,  posteriorly on the lateral view question mild pneumonia versus chronic  change.  2. CT chest would be useful for more accurate assessment.)  Tests in the medicine section of CPT®: reviewed and ordered (EKG interpreted.   )  Decide to obtain previous medical records or to obtain history from someone other than the patient: yes  Review and summarize past medical records: yes (Pt is followed by Dr. Montero, cardiologist, due to pt's h/o A-Fib. Pt had a negative stress test performed in 06/2016.  )  Discuss the patient with other providers: yes (Discussed case with Dr. De Santiago, cardiologist on-call for Dr. Montero. )    Patient Progress  Patient progress: stable             DIAGNOSIS  Final diagnoses:   Atrial fibrillation with RVR         DISPOSITION  Pt discharged.    DISCHARGE    Patient discharged in stable condition.    Reviewed implications of results, diagnosis, meds, responsibility to follow up, warning signs and symptoms of  possible worsening, potential complications and reasons to return to ER.    Patient/Family voiced understanding of above instructions.    Discussed plan for discharge, as there is no emergent indication for admission.  Pt/family is agreeable and understands need for follow up and repeat testing.  Pt is aware that discharge does not mean that nothing is wrong but it indicates no emergency is present that requires admission and they must continue care with follow-up as given below or physician of their choice.     FOLLOW-UP  Keo Montero MD  [address] [address]  [phone]    Schedule an appointment as soon as possible for a visit           Medication List      New Prescriptions          rivaroxaban 20 MG tablet   Commonly known as:  XARELTO   Take 1 tablet by mouth Daily.         Stop          meloxicam 15 MG tablet   Commonly known as:  MOBIC                   Latest Documented Vital Signs:  As of 11:29 AM  BP- 116/75 HR- (!) 49 Temp- 97.2 °F (36.2 °C) (Tympanic) O2 sat- 94%        --  Documentation assistance provided by randall Easton for Dr. Harley MD.  Information recorded by the scribe was done at my direction and has been verified and validated by me.                    Darinel Easton  08/09/17 8833       Marcos Souza MD  08/09/17 8113

## 2017-08-09 NOTE — ED TRIAGE NOTES
Pt states he woke up in a sweat with morning and experienced episode of chest pain, rapid heart, headache and dizziness with walking

## 2017-08-15 ENCOUNTER — OFFICE VISIT (OUTPATIENT)
Dept: CARDIOLOGY | Facility: CLINIC | Age: 72
End: 2017-08-15

## 2017-08-15 VITALS
SYSTOLIC BLOOD PRESSURE: 150 MMHG | OXYGEN SATURATION: 99 % | BODY MASS INDEX: 30.06 KG/M2 | DIASTOLIC BLOOD PRESSURE: 98 MMHG | HEART RATE: 50 BPM | WEIGHT: 210 LBS | HEIGHT: 70 IN

## 2017-08-15 DIAGNOSIS — I48.0 PAF (PAROXYSMAL ATRIAL FIBRILLATION) (HCC): Primary | ICD-10-CM

## 2017-08-15 PROCEDURE — 93000 ELECTROCARDIOGRAM COMPLETE: CPT | Performed by: INTERNAL MEDICINE

## 2017-08-15 PROCEDURE — 99214 OFFICE O/P EST MOD 30 MIN: CPT | Performed by: INTERNAL MEDICINE

## 2017-08-15 RX ORDER — CETIRIZINE HYDROCHLORIDE 10 MG/1
10 TABLET ORAL AS NEEDED
COMMUNITY

## 2017-08-22 NOTE — PROGRESS NOTES
Subjective:     Encounter Date:08/15/2017      Patient ID: Carlo Archer is a 72 y.o. male.    Chief Complaint:  Atrial Fibrillation   Presents for follow-up visit. Symptoms are negative for chest pain, hypertension, palpitations and shortness of breath. The symptoms have been stable. Past medical history includes atrial fibrillation and CAD.   Hypertension   This is a chronic problem. The current episode started more than 1 year ago. Pertinent negatives include no chest pain, palpitations or shortness of breath.   Coronary Artery Disease   Presents for follow-up visit. Pertinent negatives include no chest pain, chest pressure, chest tightness, palpitations or shortness of breath. Risk factors do not include hypertension.     Patient presents today for reevaluation.  He still going in and out of atrial fibrillation.  He was in the emergency room on August 9 he converted.  No chest pain or  edema.  Overall he is looking pretty good says he feels pretty good.    Review of Systems   HENT: Positive for hearing loss.    Cardiovascular: Negative for chest pain and palpitations.   Respiratory: Positive for wheezing. Negative for chest tightness and shortness of breath.    All other systems reviewed and are negative.        ECG 12 Lead  Date/Time: 8/15/2017 1:14 PM  Performed by: RODERICK PATEL  Authorized by: RODERICK PATEL   Comparison: compared with previous ECG from 8/9/2017  Similar to previous ECG  Rhythm: sinus rhythm  Clinical impression: normal ECG               Objective:     Physical Exam   Constitutional: He is oriented to person, place, and time. He appears well-developed.   HENT:   Head: Normocephalic.   Eyes: Conjunctivae are normal.   Neck: Normal range of motion.   Cardiovascular: Normal rate, regular rhythm and normal heart sounds.    Pulmonary/Chest: Breath sounds normal.   Abdominal: Soft. Bowel sounds are normal.   Musculoskeletal: Normal range of motion. He exhibits no edema.    Neurological: He is alert and oriented to person, place, and time.   Skin: Skin is warm and dry.   Psychiatric: He has a normal mood and affect. His behavior is normal.   Vitals reviewed.      Lab Review:       Assessment:         No diagnosis found.       Plan:       1.  I think he is describing PVCs.    2.  Paroxysmal A. fib At this point we'll place him on Eliquis 5 mg twice a day.  Will follow clinically.  3.  History of coronary artery disease  No CP  4.   hypertension his blood pressure is increase him today regarding follow-up more closely.    5. Continue same follow-up 6-12 months    Atrial Fibrillation and Atrial Flutter  Assessment  • The patient has paroxysmal atrial fibrillation  • This is non-valvular in etiology  • The patient's CHADS2-VASc score is 2  • A [id number] score of 2 or more is considered a high risk for a thromboembolic event  • Apixaban prescribed    Plan  • Attempt to maintain sinus rhythm  • Continue aspirin for antithrombotic therapy, bleeding issues discussed  • Continue beta blocker for rhythm control

## 2017-08-29 RX ORDER — ATORVASTATIN CALCIUM 40 MG/1
TABLET, FILM COATED ORAL
Qty: 27 TABLET | Refills: 3 | Status: SHIPPED | OUTPATIENT
Start: 2017-08-29 | End: 2017-12-12 | Stop reason: SDUPTHER

## 2017-09-12 ENCOUNTER — TELEPHONE (OUTPATIENT)
Dept: CARDIOLOGY | Facility: CLINIC | Age: 72
End: 2017-09-12

## 2017-09-12 NOTE — TELEPHONE ENCOUNTER
Gabbie left msg saying you had given Carlo some samples of Eliquis 5 mg BID at his last OV and now he only has 1 more day left.  She would like a prescription sent in for Eliquis to their pharmacy.        Thanks,  Dunia

## 2017-11-15 ENCOUNTER — OFFICE VISIT (OUTPATIENT)
Dept: CARDIOLOGY | Facility: CLINIC | Age: 72
End: 2017-11-15

## 2017-11-15 VITALS
BODY MASS INDEX: 30.49 KG/M2 | HEIGHT: 70 IN | WEIGHT: 213 LBS | RESPIRATION RATE: 20 BRPM | SYSTOLIC BLOOD PRESSURE: 200 MMHG | HEART RATE: 51 BPM | DIASTOLIC BLOOD PRESSURE: 108 MMHG

## 2017-11-15 DIAGNOSIS — I25.10 CORONARY ARTERY DISEASE INVOLVING NATIVE CORONARY ARTERY OF NATIVE HEART WITHOUT ANGINA PECTORIS: ICD-10-CM

## 2017-11-15 DIAGNOSIS — I48.0 PAF (PAROXYSMAL ATRIAL FIBRILLATION) (HCC): ICD-10-CM

## 2017-11-15 DIAGNOSIS — I10 ESSENTIAL HYPERTENSION: Primary | ICD-10-CM

## 2017-11-15 PROCEDURE — 93000 ELECTROCARDIOGRAM COMPLETE: CPT | Performed by: INTERNAL MEDICINE

## 2017-11-15 PROCEDURE — 99214 OFFICE O/P EST MOD 30 MIN: CPT | Performed by: INTERNAL MEDICINE

## 2017-11-15 NOTE — PROGRESS NOTES
Subjective:     Encounter Date:11/15/2017      Patient ID: Carlo Archer is a 72 y.o. male.    Chief Complaint:  Atrial Fibrillation   Presents for follow-up visit. Symptoms are negative for chest pain, hypertension, palpitations and shortness of breath. The symptoms have been stable. Past medical history includes atrial fibrillation and CAD.   Hypertension   This is a chronic problem. The current episode started more than 1 year ago. Pertinent negatives include no chest pain, palpitations or shortness of breath.   Coronary Artery Disease   Presents for follow-up visit. Pertinent negatives include no chest pain, chest pressure, chest tightness, palpitations or shortness of breath. Risk factors do not include hypertension.       Patient presents today for reevaluation.  Clinically is doing great with no complaints.  Occasional shortness of breath occasional fatigue.  Blood pressure was markedly elevated today.  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  Usually makes incredibly busy and stressed.    Review of Systems   Cardiovascular: Negative for chest pain and palpitations.   Respiratory: Negative for chest tightness and shortness of breath.    All other systems reviewed and are negative.        ECG 12 Lead  Date/Time: 11/15/2017 2:49 PM  Performed by: RODERICK PATEL  Authorized by: RODERICK PATEL   Rhythm: sinus rhythm  Other findings: LVH  Clinical impression: abnormal ECG               Objective:     Physical Exam   Constitutional: He is oriented to person, place, and time. He appears well-developed.   HENT:   Head: Normocephalic.   Eyes: Conjunctivae are normal.   Neck: Normal range of motion.   Cardiovascular: Normal rate, regular rhythm and normal heart sounds.    Pulmonary/Chest: Breath sounds normal.   Abdominal: Soft. Bowel sounds are normal.   Musculoskeletal: Normal range of motion. He exhibits no edema.   Neurological: He is alert and oriented to  person, place, and time.   Skin: Skin is warm and dry.   Psychiatric: He has a normal mood and affect. His behavior is normal.   Vitals reviewed.      Lab Review:       Assessment:          Diagnosis Plan   1. Essential hypertension     2. Coronary artery disease involving native coronary artery of native heart without angina pectoris     3. PAF (paroxysmal atrial fibrillation)            Plan:       1.  Paroxysmal atrial  fibrillation.  Remains in sinus rhythm on anticoagulation.  2.  Coronary artery disease stable no issues  3.  Hypertension.  His blood pressure was really high today.  We did repeated and was still about 180/100.  He has a daughter who is a nurse and he needs to start following his blood pressure at home daily.  He also has an automated cuff that he doesn't utilize.  I told her right down values for a week or 2 and report back to me.  4.  Follow-up 6 months sooner course if his blood pressure remains elevated.    Atrial Fibrillation and Atrial Flutter  Assessment  • The patient has paroxysmal atrial fibrillation  • This is non-valvular in etiology  • The patient's CHADS2-VASc score is 2  • A [id number] score of 2 or more is considered a high risk for a thromboembolic event  • Apixaban prescribed    Plan  • Attempt to maintain sinus rhythm  • Continue aspirin for antithrombotic therapy, bleeding issues discussed  • Continue beta blocker for rhythm control      Coronary Artery Disease  Assessment  • The patient has no angina    Plan  • Lifestyle modifications discussed include adhering to a heart healthy diet, avoidance of tobacco products, maintenance of a healthy weight, medication compliance, regular exercise and regular monitoring of cholesterol and blood pressure    Subjective - Objective  • Current antiplatelet therapy includes aspirin 81 mg

## 2017-11-27 RX ORDER — VALSARTAN 320 MG/1
TABLET ORAL
Qty: 90 TABLET | Refills: 1 | Status: SHIPPED | OUTPATIENT
Start: 2017-11-27 | End: 2018-07-30 | Stop reason: ALTCHOICE

## 2017-12-08 ENCOUNTER — TELEPHONE (OUTPATIENT)
Dept: CARDIOLOGY | Facility: CLINIC | Age: 72
End: 2017-12-08

## 2017-12-08 DIAGNOSIS — I10 ESSENTIAL HYPERTENSION: Primary | ICD-10-CM

## 2017-12-08 RX ORDER — HYDROCHLOROTHIAZIDE 25 MG/1
25 TABLET ORAL DAILY
Qty: 30 TABLET | Refills: 2 | Status: SHIPPED | OUTPATIENT
Start: 2017-12-08 | End: 2018-10-11 | Stop reason: ALTCHOICE

## 2017-12-08 NOTE — TELEPHONE ENCOUNTER
I spoke with pt and let him know you reviewed his BP readings he emailed us and told him I sent in the HCTZ you prescribed.  I also told him you would be placing an order for him to get some lab work done in 1 week.    Thanks,  Dunia

## 2017-12-13 RX ORDER — ATORVASTATIN CALCIUM 40 MG/1
TABLET, FILM COATED ORAL
Qty: 27 TABLET | Refills: 0 | Status: SHIPPED | OUTPATIENT
Start: 2017-12-13 | End: 2017-12-27 | Stop reason: SDUPTHER

## 2017-12-22 RX ORDER — AMLODIPINE BESYLATE 5 MG/1
TABLET ORAL
Qty: 30 TABLET | Refills: 11 | OUTPATIENT
Start: 2017-12-22

## 2017-12-26 ENCOUNTER — TELEPHONE (OUTPATIENT)
Dept: CARDIOLOGY | Facility: CLINIC | Age: 72
End: 2017-12-26

## 2017-12-26 NOTE — TELEPHONE ENCOUNTER
Pt would really like to discuss with you about not starting on HCTZ. He would like you to call him at [phone]......Ritu

## 2017-12-27 RX ORDER — ATORVASTATIN CALCIUM 40 MG/1
TABLET, FILM COATED ORAL
Qty: 30 TABLET | Refills: 5 | Status: SHIPPED | OUTPATIENT
Start: 2017-12-27

## 2017-12-27 RX ORDER — AMLODIPINE BESYLATE 5 MG/1
5 TABLET ORAL DAILY
Qty: 30 TABLET | Refills: 11 | Status: SHIPPED | OUTPATIENT
Start: 2017-12-27 | End: 2021-09-22

## 2017-12-27 NOTE — TELEPHONE ENCOUNTER
I called patient.  I sent him in Norvasc 5 mg a day.  Please added to his medicine list.  I told him not to take the water pill for the time being his blood pressures consistently running 130/70 at home.

## 2018-02-20 ENCOUNTER — TELEPHONE (OUTPATIENT)
Dept: CARDIOLOGY | Facility: CLINIC | Age: 73
End: 2018-02-20

## 2018-02-20 NOTE — TELEPHONE ENCOUNTER
This is okay.  Please tell them that his blood pressure will probably go up with the steroids.  it will be temporary.

## 2018-02-20 NOTE — TELEPHONE ENCOUNTER
Pt's wife called because was at the [facility] yesterday and was DX with pneumonia. They started him on Levaquin and Methylprednisolone. He is wanting to make sure this is ok for him to take. He said is a little jittery today after taking first dose yesterday. He did not give me exact dosage and I tried to call and get it but got no answer..........Ritu

## 2018-02-27 RX ORDER — METOPROLOL SUCCINATE 25 MG/1
TABLET, EXTENDED RELEASE ORAL
Qty: 30 TABLET | Refills: 2 | Status: SHIPPED | OUTPATIENT
Start: 2018-02-27 | End: 2021-01-15 | Stop reason: SDUPTHER

## 2018-06-21 ENCOUNTER — TELEPHONE (OUTPATIENT)
Dept: CARDIOLOGY | Facility: CLINIC | Age: 73
End: 2018-06-21

## 2018-06-21 NOTE — TELEPHONE ENCOUNTER
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 He needs it by Sunday 6/24/18. His LOV with you was 11/15/2017.......Ritu

## 2018-07-30 ENCOUNTER — TELEPHONE (OUTPATIENT)
Dept: CARDIOLOGY | Facility: CLINIC | Age: 73
End: 2018-07-30

## 2018-07-30 RX ORDER — LOSARTAN POTASSIUM 100 MG/1
100 TABLET ORAL DAILY
Qty: 30 TABLET | Refills: 3 | Status: SHIPPED | OUTPATIENT
Start: 2018-07-30 | End: 2018-10-11 | Stop reason: ALTCHOICE

## 2018-07-30 NOTE — TELEPHONE ENCOUNTER
Gabbie called stating that she called last week and has not heard anything back.  Pt is on Diovan and this has been recalled and needs to know what can be sent in to replace the Diovan.  She is concerned because pt has been without his blood pressure medication

## 2018-10-11 ENCOUNTER — OFFICE VISIT (OUTPATIENT)
Dept: CARDIOLOGY | Facility: CLINIC | Age: 73
End: 2018-10-11

## 2018-10-11 VITALS
BODY MASS INDEX: 29.63 KG/M2 | DIASTOLIC BLOOD PRESSURE: 82 MMHG | HEART RATE: 49 BPM | SYSTOLIC BLOOD PRESSURE: 144 MMHG | WEIGHT: 207 LBS | HEIGHT: 70 IN

## 2018-10-11 DIAGNOSIS — I48.0 PAF (PAROXYSMAL ATRIAL FIBRILLATION) (HCC): ICD-10-CM

## 2018-10-11 DIAGNOSIS — I25.10 CORONARY ARTERY DISEASE INVOLVING NATIVE CORONARY ARTERY OF NATIVE HEART WITHOUT ANGINA PECTORIS: ICD-10-CM

## 2018-10-11 DIAGNOSIS — I10 ESSENTIAL HYPERTENSION: Primary | ICD-10-CM

## 2018-10-11 PROCEDURE — 99214 OFFICE O/P EST MOD 30 MIN: CPT | Performed by: INTERNAL MEDICINE

## 2018-10-11 PROCEDURE — 93000 ELECTROCARDIOGRAM COMPLETE: CPT | Performed by: INTERNAL MEDICINE

## 2018-10-11 RX ORDER — VALSARTAN 320 MG/1
320 TABLET ORAL DAILY
COMMUNITY

## 2018-10-11 NOTE — PROGRESS NOTES
Subjective:     Encounter Date:11/15/2017      Patient ID: Carlo Archer is a 73 y.o. male.    Chief Complaint:  Atrial Fibrillation   Presents for follow-up visit. Symptoms are negative for chest pain, hypertension, palpitations and shortness of breath. The symptoms have been stable. Past medical history includes atrial fibrillation and CAD.   Hypertension   This is a chronic problem. The current episode started more than 1 year ago. Pertinent negatives include no chest pain, palpitations or shortness of breath.   Coronary Artery Disease   Presents for follow-up visit. Pertinent negatives include no chest pain, chest pressure, chest tightness, palpitations or shortness of breath. Risk factors do not include hypertension.       Patient presents today for reevaluation.  Overall he is doing relatively well.  He was having some episodes of lightheadedness so he cut his amlodipine and half.  I do agree with that approach and since he's done and he's felt better.          Review of Systems   Cardiovascular: Negative for chest pain and palpitations.   Respiratory: Negative for chest tightness and shortness of breath.    All other systems reviewed and are negative.        ECG 12 Lead  Date/Time: 10/11/2018 1:25 PM  Performed by: RODERICK PATEL  Authorized by: RODERICK PATEL   Comparison: compared with previous ECG from 11/15/2017  Rhythm: sinus bradycardia  Clinical impression: abnormal ECG               Objective:     Physical Exam   Constitutional: He is oriented to person, place, and time. He appears well-developed.   HENT:   Head: Normocephalic.   Eyes: Conjunctivae are normal.   Neck: Normal range of motion.   Cardiovascular: Normal rate, regular rhythm and normal heart sounds.    Pulmonary/Chest: Breath sounds normal.   Abdominal: Soft. Bowel sounds are normal.   Musculoskeletal: Normal range of motion. He exhibits no edema.   Neurological: He is alert and oriented to person, place, and time.   Skin: Skin  is warm and dry.   Psychiatric: He has a normal mood and affect. His behavior is normal.   Vitals reviewed.      Lab Review:       Assessment:          Diagnosis Plan   1. Essential hypertension     2. PAF (paroxysmal atrial fibrillation) (CMS/HCC)     3. Coronary artery disease involving native coronary artery of native heart without angina pectoris            Plan:       1.  Paroxysmal atrial  fibrillation.  Remains in sinus rhythm on anticoagulation.  2.  Coronary artery disease stable no issues  3.  Hypertension.  His blood pressure was ok  4.  Follow-up 12 months sooner course if his blood pressure remains elevated.    Atrial Fibrillation and Atrial Flutter  Assessment  • The patient has paroxysmal atrial fibrillation  • This is non-valvular in etiology  • The patient's CHADS2-VASc score is 2  • A [id number] score of 2 or more is considered a high risk for a thromboembolic event  • Apixaban prescribed    Plan  • Attempt to maintain sinus rhythm  • Continue aspirin for antithrombotic therapy, bleeding issues discussed  • Continue beta blocker for rhythm control      Coronary Artery Disease  Assessment  • The patient has no angina    Plan  • Lifestyle modifications discussed include adhering to a heart healthy diet, avoidance of tobacco products, maintenance of a healthy weight, medication compliance, regular exercise and regular monitoring of cholesterol and blood pressure    Subjective - Objective  • Current antiplatelet therapy includes aspirin 81 mg

## 2019-01-20 ENCOUNTER — INPATIENT HOSPITAL (AMBULATORY)
Dept: URBAN - METROPOLITAN AREA HOSPITAL 76 | Facility: HOSPITAL | Age: 74
End: 2019-01-20

## 2019-01-20 DIAGNOSIS — I10 ESSENTIAL (PRIMARY) HYPERTENSION: ICD-10-CM

## 2019-01-20 DIAGNOSIS — I48.91 UNSPECIFIED ATRIAL FIBRILLATION: ICD-10-CM

## 2019-01-20 DIAGNOSIS — E87.6 HYPOKALEMIA: ICD-10-CM

## 2019-01-20 DIAGNOSIS — I25.10 ATHEROSCLEROTIC HEART DISEASE OF NATIVE CORONARY ARTERY WITH: ICD-10-CM

## 2019-01-20 DIAGNOSIS — R13.10 DYSPHAGIA, UNSPECIFIED: ICD-10-CM

## 2019-01-20 DIAGNOSIS — R74.8 ABNORMAL LEVELS OF OTHER SERUM ENZYMES: ICD-10-CM

## 2019-01-20 DIAGNOSIS — R93.3 ABNORMAL FINDINGS ON DIAGNOSTIC IMAGING OF OTHER PARTS OF DI: ICD-10-CM

## 2019-01-20 PROCEDURE — 99254 IP/OBS CNSLTJ NEW/EST MOD 60: CPT | Performed by: INTERNAL MEDICINE

## 2019-01-21 ENCOUNTER — INPATIENT HOSPITAL (AMBULATORY)
Dept: URBAN - METROPOLITAN AREA HOSPITAL 76 | Facility: HOSPITAL | Age: 74
End: 2019-01-21

## 2019-01-21 ENCOUNTER — TELEPHONE (OUTPATIENT)
Dept: CARDIOLOGY | Facility: CLINIC | Age: 74
End: 2019-01-21

## 2019-01-21 DIAGNOSIS — I48.91 UNSPECIFIED ATRIAL FIBRILLATION: ICD-10-CM

## 2019-01-21 DIAGNOSIS — I10 ESSENTIAL (PRIMARY) HYPERTENSION: ICD-10-CM

## 2019-01-21 DIAGNOSIS — I25.10 ATHEROSCLEROTIC HEART DISEASE OF NATIVE CORONARY ARTERY WITH: ICD-10-CM

## 2019-01-21 DIAGNOSIS — R74.8 ABNORMAL LEVELS OF OTHER SERUM ENZYMES: ICD-10-CM

## 2019-01-21 DIAGNOSIS — R13.10 DYSPHAGIA, UNSPECIFIED: ICD-10-CM

## 2019-01-21 DIAGNOSIS — Z79.01 LONG TERM (CURRENT) USE OF ANTICOAGULANTS: ICD-10-CM

## 2019-01-21 DIAGNOSIS — R79.89 OTHER SPECIFIED ABNORMAL FINDINGS OF BLOOD CHEMISTRY: ICD-10-CM

## 2019-01-21 DIAGNOSIS — R93.3 ABNORMAL FINDINGS ON DIAGNOSTIC IMAGING OF OTHER PARTS OF DI: ICD-10-CM

## 2019-01-21 DIAGNOSIS — Z95.5 PRESENCE OF CORONARY ANGIOPLASTY IMPLANT AND GRAFT: ICD-10-CM

## 2019-01-21 PROCEDURE — 99232 SBSQ HOSP IP/OBS MODERATE 35: CPT | Performed by: INTERNAL MEDICINE

## 2019-01-21 NOTE — TELEPHONE ENCOUNTER
Gabbie left [facility] letting you know Carlo is in [facility].  He is possibly having gallbladder surgery tomorrow. She said his BP and HR have been extremely low.  She knows you aren't on staff there but she really wants to discuss this with you.    Gabbie# [phone]    Thanks,  Dunia

## 2019-01-22 ENCOUNTER — INPATIENT HOSPITAL (AMBULATORY)
Dept: URBAN - METROPOLITAN AREA HOSPITAL 76 | Facility: HOSPITAL | Age: 74
End: 2019-01-22

## 2019-01-22 DIAGNOSIS — R93.3 ABNORMAL FINDINGS ON DIAGNOSTIC IMAGING OF OTHER PARTS OF DI: ICD-10-CM

## 2019-01-22 DIAGNOSIS — I25.10 ATHEROSCLEROTIC HEART DISEASE OF NATIVE CORONARY ARTERY WITH: ICD-10-CM

## 2019-01-22 DIAGNOSIS — Z79.01 LONG TERM (CURRENT) USE OF ANTICOAGULANTS: ICD-10-CM

## 2019-01-22 DIAGNOSIS — Z95.5 PRESENCE OF CORONARY ANGIOPLASTY IMPLANT AND GRAFT: ICD-10-CM

## 2019-01-22 DIAGNOSIS — R74.8 ABNORMAL LEVELS OF OTHER SERUM ENZYMES: ICD-10-CM

## 2019-01-22 DIAGNOSIS — R13.10 DYSPHAGIA, UNSPECIFIED: ICD-10-CM

## 2019-01-22 DIAGNOSIS — E87.6 HYPOKALEMIA: ICD-10-CM

## 2019-01-22 DIAGNOSIS — I48.91 UNSPECIFIED ATRIAL FIBRILLATION: ICD-10-CM

## 2019-01-22 PROCEDURE — 99232 SBSQ HOSP IP/OBS MODERATE 35: CPT | Performed by: INTERNAL MEDICINE

## 2019-01-23 ENCOUNTER — INPATIENT HOSPITAL (AMBULATORY)
Dept: URBAN - METROPOLITAN AREA HOSPITAL 76 | Facility: HOSPITAL | Age: 74
End: 2019-01-23

## 2019-01-23 DIAGNOSIS — R93.3 ABNORMAL FINDINGS ON DIAGNOSTIC IMAGING OF OTHER PARTS OF DI: ICD-10-CM

## 2019-01-23 DIAGNOSIS — Z79.01 LONG TERM (CURRENT) USE OF ANTICOAGULANTS: ICD-10-CM

## 2019-01-23 DIAGNOSIS — R79.89 OTHER SPECIFIED ABNORMAL FINDINGS OF BLOOD CHEMISTRY: ICD-10-CM

## 2019-01-23 DIAGNOSIS — I48.91 UNSPECIFIED ATRIAL FIBRILLATION: ICD-10-CM

## 2019-01-23 DIAGNOSIS — Z95.5 PRESENCE OF CORONARY ANGIOPLASTY IMPLANT AND GRAFT: ICD-10-CM

## 2019-01-23 DIAGNOSIS — I10 ESSENTIAL (PRIMARY) HYPERTENSION: ICD-10-CM

## 2019-01-23 DIAGNOSIS — I25.10 ATHEROSCLEROTIC HEART DISEASE OF NATIVE CORONARY ARTERY WITH: ICD-10-CM

## 2019-01-23 DIAGNOSIS — K85.10 BILIARY ACUTE PANCREATITIS WITHOUT NECROSIS OR INFECTION: ICD-10-CM

## 2019-01-23 PROCEDURE — 99232 SBSQ HOSP IP/OBS MODERATE 35: CPT | Performed by: INTERNAL MEDICINE

## 2019-01-30 ENCOUNTER — TELEPHONE (OUTPATIENT)
Dept: CARDIOLOGY | Facility: CLINIC | Age: 74
End: 2019-01-30

## 2019-01-30 NOTE — TELEPHONE ENCOUNTER
Gabbie left [facility] [place] [facility] for [place] saying he had his gallbladder removed last week and since the surgery he hasn't been taking the metoprolol--is this OK?  She said it was stopped in the hospital due to low heart rate but everything seems fine now.  Carlo wants to know if he can stop the Eliquis because ever since he got home from the hospital his feet are cold.    Carlo # [phone]    Thanks,  Dunia

## 2019-02-04 ENCOUNTER — OFFICE (AMBULATORY)
Dept: URBAN - METROPOLITAN AREA CLINIC 64 | Facility: CLINIC | Age: 74
End: 2019-02-04

## 2019-02-04 VITALS
DIASTOLIC BLOOD PRESSURE: 81 MMHG | SYSTOLIC BLOOD PRESSURE: 147 MMHG | HEART RATE: 61 BPM | WEIGHT: 200 LBS | HEIGHT: 70 IN

## 2019-02-04 DIAGNOSIS — K85.10 BILIARY ACUTE PANCREATITIS WITHOUT NECROSIS OR INFECTION: ICD-10-CM

## 2019-02-04 DIAGNOSIS — R13.10 DYSPHAGIA, UNSPECIFIED: ICD-10-CM

## 2019-02-04 PROCEDURE — 99214 OFFICE O/P EST MOD 30 MIN: CPT | Performed by: INTERNAL MEDICINE

## 2019-03-02 ENCOUNTER — HOSPITAL ENCOUNTER (EMERGENCY)
Facility: HOSPITAL | Age: 74
Discharge: HOME | End: 2019-03-03
Attending: EMERGENCY MEDICINE | Admitting: EMERGENCY MEDICINE

## 2019-03-02 ENCOUNTER — APPOINTMENT (OUTPATIENT)
Dept: CT IMAGING | Facility: HOSPITAL | Age: 74
End: 2019-03-02

## 2019-03-02 ENCOUNTER — APPOINTMENT (OUTPATIENT)
Dept: GENERAL RADIOLOGY | Facility: HOSPITAL | Age: 74
End: 2019-03-02

## 2019-03-02 DIAGNOSIS — I48.0 PAROXYSMAL A-FIB (HCC): Primary | ICD-10-CM

## 2019-03-02 LAB
BASOPHILS # BLD AUTO: 0.1 10*3/MM3 (ref 0–0.2)
BASOPHILS NFR BLD AUTO: 1.7 % (ref 0–1.5)
BILIRUB UR QL STRIP: NEGATIVE
CLARITY UR: CLEAR
COLOR UR: YELLOW
DEPRECATED RDW RBC AUTO: 43.6 FL (ref 37–54)
EOSINOPHIL # BLD AUTO: 0.21 10*3/MM3 (ref 0–0.4)
EOSINOPHIL NFR BLD AUTO: 3.6 % (ref 0.3–6.2)
ERYTHROCYTE [DISTWIDTH] IN BLOOD BY AUTOMATED COUNT: 13.1 % (ref 12.3–15.4)
GLUCOSE UR STRIP-MCNC: NEGATIVE MG/DL
HCT VFR BLD AUTO: 42.8 % (ref 37.5–51)
HGB BLD-MCNC: 13.8 G/DL (ref 13–17.7)
HGB UR QL STRIP.AUTO: NEGATIVE
IMM GRANULOCYTES # BLD AUTO: 0.01 10*3/MM3 (ref 0–0.05)
IMM GRANULOCYTES NFR BLD AUTO: 0.2 % (ref 0–0.5)
INR PPP: 1.42 (ref 0.9–1.1)
KETONES UR QL STRIP: NEGATIVE
LEUKOCYTE ESTERASE UR QL STRIP.AUTO: NEGATIVE
LYMPHOCYTES # BLD AUTO: 2.12 10*3/MM3 (ref 0.7–3.1)
LYMPHOCYTES NFR BLD AUTO: 36.5 % (ref 19.6–45.3)
MCH RBC QN AUTO: 29.6 PG (ref 26.6–33)
MCHC RBC AUTO-ENTMCNC: 32.2 G/DL (ref 31.5–35.7)
MCV RBC AUTO: 91.6 FL (ref 79–97)
MONOCYTES # BLD AUTO: 0.56 10*3/MM3 (ref 0.1–0.9)
MONOCYTES NFR BLD AUTO: 9.6 % (ref 5–12)
NEUTROPHILS # BLD AUTO: 2.81 10*3/MM3 (ref 1.4–7)
NEUTROPHILS NFR BLD AUTO: 48.4 % (ref 42.7–76)
NITRITE UR QL STRIP: NEGATIVE
NRBC BLD AUTO-RTO: 0 /100 WBC (ref 0–0)
PH UR STRIP.AUTO: 6.5 [PH] (ref 5–8)
PLATELET # BLD AUTO: 167 10*3/MM3 (ref 140–450)
PMV BLD AUTO: 10.3 FL (ref 6–12)
PROT UR QL STRIP: NEGATIVE
PROTHROMBIN TIME: 17.1 SECONDS (ref 11.7–14.2)
RBC # BLD AUTO: 4.67 10*6/MM3 (ref 4.14–5.8)
SP GR UR STRIP: 1.01 (ref 1–1.03)
UROBILINOGEN UR QL STRIP: NORMAL
WBC NRBC COR # BLD: 5.81 10*3/MM3 (ref 3.4–10.8)

## 2019-03-02 PROCEDURE — 81003 URINALYSIS AUTO W/O SCOPE: CPT | Performed by: EMERGENCY MEDICINE

## 2019-03-02 PROCEDURE — 70450 CT HEAD/BRAIN W/O DYE: CPT

## 2019-03-02 PROCEDURE — 71046 X-RAY EXAM CHEST 2 VIEWS: CPT

## 2019-03-02 PROCEDURE — 85610 PROTHROMBIN TIME: CPT | Performed by: EMERGENCY MEDICINE

## 2019-03-02 PROCEDURE — 96374 THER/PROPH/DIAG INJ IV PUSH: CPT

## 2019-03-02 PROCEDURE — 99284 EMERGENCY DEPT VISIT MOD MDM: CPT

## 2019-03-02 PROCEDURE — 84443 ASSAY THYROID STIM HORMONE: CPT | Performed by: EMERGENCY MEDICINE

## 2019-03-02 PROCEDURE — 93005 ELECTROCARDIOGRAM TRACING: CPT | Performed by: EMERGENCY MEDICINE

## 2019-03-02 PROCEDURE — 83690 ASSAY OF LIPASE: CPT | Performed by: EMERGENCY MEDICINE

## 2019-03-02 PROCEDURE — 25010000002 DIGOXIN PER 500 MCG: Performed by: EMERGENCY MEDICINE

## 2019-03-02 PROCEDURE — 84484 ASSAY OF TROPONIN QUANT: CPT | Performed by: EMERGENCY MEDICINE

## 2019-03-02 PROCEDURE — 93010 ELECTROCARDIOGRAM REPORT: CPT | Performed by: INTERNAL MEDICINE

## 2019-03-02 PROCEDURE — 84439 ASSAY OF FREE THYROXINE: CPT | Performed by: EMERGENCY MEDICINE

## 2019-03-02 PROCEDURE — 85025 COMPLETE CBC W/AUTO DIFF WBC: CPT | Performed by: EMERGENCY MEDICINE

## 2019-03-02 PROCEDURE — 93005 ELECTROCARDIOGRAM TRACING: CPT

## 2019-03-02 PROCEDURE — 80053 COMPREHEN METABOLIC PANEL: CPT | Performed by: EMERGENCY MEDICINE

## 2019-03-02 PROCEDURE — 83735 ASSAY OF MAGNESIUM: CPT | Performed by: EMERGENCY MEDICINE

## 2019-03-02 RX ORDER — DIGOXIN 0.25 MG/ML
500 INJECTION INTRAMUSCULAR; INTRAVENOUS ONCE
Status: COMPLETED | OUTPATIENT
Start: 2019-03-02 | End: 2019-03-02

## 2019-03-02 RX ADMIN — DIGOXIN 500 MCG: 0.25 INJECTION INTRAMUSCULAR; INTRAVENOUS at 23:54

## 2019-03-03 VITALS
RESPIRATION RATE: 16 BRPM | TEMPERATURE: 96.4 F | HEIGHT: 70 IN | SYSTOLIC BLOOD PRESSURE: 130 MMHG | OXYGEN SATURATION: 94 % | BODY MASS INDEX: 28.26 KG/M2 | HEART RATE: 49 BPM | DIASTOLIC BLOOD PRESSURE: 77 MMHG | WEIGHT: 197.4 LBS

## 2019-03-03 LAB
ALBUMIN SERPL-MCNC: 4.2 G/DL (ref 3.5–5.2)
ALBUMIN/GLOB SERPL: 1.4 G/DL
ALP SERPL-CCNC: 68 U/L (ref 39–117)
ALT SERPL W P-5'-P-CCNC: 15 U/L (ref 1–41)
ANION GAP SERPL CALCULATED.3IONS-SCNC: 12 MMOL/L
AST SERPL-CCNC: 19 U/L (ref 1–40)
BILIRUB SERPL-MCNC: 0.4 MG/DL (ref 0.1–1.2)
BUN BLD-MCNC: 10 MG/DL (ref 8–23)
BUN/CREAT SERPL: 11.5 (ref 7–25)
CALCIUM SPEC-SCNC: 9.5 MG/DL (ref 8.6–10.5)
CHLORIDE SERPL-SCNC: 106 MMOL/L (ref 98–107)
CO2 SERPL-SCNC: 23 MMOL/L (ref 22–29)
CREAT BLD-MCNC: 0.87 MG/DL (ref 0.76–1.27)
GFR SERPL CREATININE-BSD FRML MDRD: 86 ML/MIN/1.73
GLOBULIN UR ELPH-MCNC: 2.9 GM/DL
GLUCOSE BLD-MCNC: 104 MG/DL (ref 65–99)
LIPASE SERPL-CCNC: 30 U/L (ref 13–60)
MAGNESIUM SERPL-MCNC: 1.9 MG/DL (ref 1.6–2.4)
POTASSIUM BLD-SCNC: 3.7 MMOL/L (ref 3.5–5.2)
PROT SERPL-MCNC: 7.1 G/DL (ref 6–8.5)
SODIUM BLD-SCNC: 141 MMOL/L (ref 136–145)
T4 FREE SERPL-MCNC: 1.17 NG/DL (ref 0.93–1.7)
TROPONIN T SERPL-MCNC: <0.01 NG/ML (ref 0–0.03)
TSH SERPL DL<=0.05 MIU/L-ACNC: 3.43 MIU/ML (ref 0.27–4.2)

## 2019-03-03 PROCEDURE — 96361 HYDRATE IV INFUSION ADD-ON: CPT

## 2019-03-03 RX ORDER — POTASSIUM CHLORIDE 750 MG/1
40 CAPSULE, EXTENDED RELEASE ORAL ONCE
Status: COMPLETED | OUTPATIENT
Start: 2019-03-03 | End: 2019-03-03

## 2019-03-03 RX ADMIN — SODIUM CHLORIDE 500 ML: 9 INJECTION, SOLUTION INTRAVENOUS at 00:03

## 2019-03-03 RX ADMIN — POTASSIUM CHLORIDE 40 MEQ: 750 CAPSULE, EXTENDED RELEASE ORAL at 00:46

## 2019-03-03 NOTE — ED PROVIDER NOTES
EMERGENCY DEPARTMENT ENCOUNTER    CHIEF COMPLAINT  Chief Complaint: Palpitations  History given by: Pt  History limited by: Nothing  Room Number: 10/10  PMD: Enrique Moe MD      HPI:  Pt is a 74 y.o. male with a h/o Afib who presents complaining of palpitations which began at 1430 this afternoon. The pt took metoprolol at 1430 and 1700 without improvement of symptoms. The pt has also had facial pain and ear buzzing starting this evening, which have mostly resolved. The pt generally does not get headaches, although he did have one last year. The pt has never been in Afib for more than 6 hours. The pt takes Eliquis for Afib.    The pt had his gallbladder removed 5 weeks ago, and also has a h/o pancreatitis. The pt denies abd pain.     Duration:  9 hours  Onset: Gradual  Timing: Constant  Location: Chest  Quality: Pt felt that he was in Afib  Intensity/Severity: Moderate  Progression: No change  Associated Symptoms: Facial pain, ear buzzing  Aggravating Factors: Unknown  Alleviating Factors: None  Previous Episodes: The pt has a h/o Afib.  Treatment before arrival: The pt took metoprolol at 1430 and 1700 without relief of symptoms.     PAST MEDICAL HISTORY  Active Ambulatory Problems     Diagnosis Date Noted   • New onset atrial fibrillation (CMS/HCC) 01/10/2017   • Hypertension 11/15/2017   • CAD (coronary artery disease) 11/15/2017   • PAF (paroxysmal atrial fibrillation) (CMS/HCC) 11/15/2017     Resolved Ambulatory Problems     Diagnosis Date Noted   • No Resolved Ambulatory Problems     Past Medical History:   Diagnosis Date   • CAD (coronary artery disease)    • Hypertension    • PAF (paroxysmal atrial fibrillation) (CMS/HCC)        PAST SURGICAL HISTORY  Past Surgical History:   Procedure Laterality Date   • CHOLECYSTECTOMY     • CORONARY ANGIOPLASTY     • JOINT REPLACEMENT Right     hip   • TONSILLECTOMY     • VASECTOMY         FAMILY HISTORY  Family History   Problem Relation Age of Onset   •  Heart attack Father    • Hypertension Sister    • Heart disease Sister    • Sudden death Brother        SOCIAL HISTORY  Social History     Socioeconomic History   • Marital status:      Spouse name: Not on file   • Number of children: Not on file   • Years of education: Not on file   • Highest education level: Not on file   Social Needs   • Financial resource strain: Not on file   • Food insecurity - worry: Not on file   • Food insecurity - inability: Not on file   • Transportation needs - medical: Not on file   • Transportation needs - non-medical: Not on file   Occupational History   • Not on file   Tobacco Use   • Smoking status: Former Smoker   • Smokeless tobacco: Never Used   • Tobacco comment: caffeine use   Substance and Sexual Activity   • Alcohol use: Yes     Comment: occasional/ caffeine use 1 cup daily   • Drug use: No   • Sexual activity: Yes   Other Topics Concern   • Not on file   Social History Narrative   • Not on file       ALLERGIES  Penicillin v; Penicillins; Streptomycin; Bee venom; and Levaquin [levofloxacin]    REVIEW OF SYSTEMS  Review of Systems   Constitutional: Negative for activity change, appetite change and fever.   HENT: Negative for congestion and sore throat.         Facial pain, ear buzzing   Eyes: Negative.    Respiratory: Negative for cough and shortness of breath.    Cardiovascular: Positive for palpitations. Negative for chest pain and leg swelling.   Gastrointestinal: Negative for abdominal pain, diarrhea and vomiting.   Endocrine: Negative.    Genitourinary: Negative for decreased urine volume and dysuria.   Musculoskeletal: Negative for neck pain.   Skin: Negative for rash and wound.   Allergic/Immunologic: Negative.    Neurological: Negative for weakness, numbness and headaches.   Hematological: Negative.    Psychiatric/Behavioral: Negative.    All other systems reviewed and are negative.      PHYSICAL EXAM  ED Triage Vitals [03/02/19 2300]   Temp Heart Rate Resp BP  SpO2   96.4 °F (35.8 °C) 77 18 -- 98 %      Temp src Heart Rate Source Patient Position BP Location FiO2 (%)   Tympanic -- -- -- --       Physical Exam   Constitutional: He is oriented to person, place, and time. No distress.   HENT:   Head: Normocephalic and atraumatic.   Mouth/Throat: Oropharynx is clear and moist.   Eyes: EOM are normal. Pupils are equal, round, and reactive to light.   Neck: Normal range of motion. Neck supple.   Cardiovascular: Normal rate and normal heart sounds. An irregularly irregular rhythm present.   HR in 90s   Pulmonary/Chest: Effort normal and breath sounds normal. No respiratory distress.   Abdominal: Soft. Bowel sounds are normal. He exhibits no distension. There is no tenderness. There is no rebound and no guarding.   Musculoskeletal: Normal range of motion. He exhibits no edema.   Neurological: He is alert and oriented to person, place, and time. He has normal sensation and normal strength.   Normal exam   Skin: Skin is warm and dry.   Psychiatric: Mood and affect normal.   Nursing note and vitals reviewed.      LAB RESULTS  Lab Results (last 24 hours)     Procedure Component Value Units Date/Time    CBC & Differential [[id number]] Collected:  03/02/19 2332    Specimen:  Blood Updated:  03/02/19 2350    Narrative:       The following orders were created for panel order CBC & Differential.  Procedure                               Abnormality         Status                     ---------                               -----------         ------                     CBC Auto Differential[[id number]]        Abnormal            Final result                 Please view results for these tests on the individual orders.    Comprehensive Metabolic Panel [[id number]]  (Abnormal) Collected:  03/02/19 2332    Specimen:  Blood Updated:  03/03/19 0011     Glucose 104 mg/dL      BUN 10 mg/dL      Creatinine 0.87 mg/dL      Sodium 141 mmol/L      Potassium 3.7 mmol/L      Chloride 106 mmol/L      CO2  23.0 mmol/L      Calcium 9.5 mg/dL      Total Protein 7.1 g/dL      Albumin 4.20 g/dL      ALT (SGPT) 15 U/L      AST (SGOT) 19 U/L      Alkaline Phosphatase 68 U/L      Total Bilirubin 0.4 mg/dL      eGFR Non African Amer 86 mL/min/1.73      Globulin 2.9 gm/dL      A/G Ratio 1.4 g/dL      BUN/Creatinine Ratio 11.5     Anion Gap 12.0 mmol/L     Narrative:       The MDRD GFR formula is only valid for adults with stable renal function between ages 18 and 70.    Protime-INR [[id number]]  (Abnormal) Collected:  03/02/19 2332    Specimen:  Blood Updated:  03/02/19 2359     Protime 17.1 Seconds      INR 1.42    Lipase [[id number]]  (Normal) Collected:  03/02/19 2332    Specimen:  Blood Updated:  03/03/19 0011     Lipase 30 U/L     Troponin [[id number]]  (Normal) Collected:  03/02/19 2332    Specimen:  Blood Updated:  03/03/19 0018     Troponin T <0.010 ng/mL     Narrative:       Troponin T Reference Range:  <= 0.03 ng/mL-   Negative for AMI  >0.03 ng/mL-     Abnormal for myocardial necrosis.  Clinicians would have to utilize clinical acumen, EKG, Troponin and serial changes to determine if it is an Acute Myocardial Infarction or myocardial injury due to an underlying chronic condition.     Magnesium [[id number]]  (Normal) Collected:  03/02/19 2332    Specimen:  Blood Updated:  03/03/19 0011     Magnesium 1.9 mg/dL     TSH [[id number]]  (Normal) Collected:  03/02/19 2332    Specimen:  Blood Updated:  03/03/19 0018     TSH 3.430 mIU/mL     T4, Free [[id number]]  (Normal) Collected:  03/02/19 2332    Specimen:  Blood Updated:  03/03/19 0018     Free T4 1.17 ng/dL     CBC Auto Differential [[id number]]  (Abnormal) Collected:  03/02/19 2332    Specimen:  Blood Updated:  03/02/19 2350     WBC 5.81 10*3/mm3      RBC 4.67 10*6/mm3      Hemoglobin 13.8 g/dL      Hematocrit 42.8 %      MCV 91.6 fL      MCH 29.6 pg      MCHC 32.2 g/dL      RDW 13.1 %      RDW-SD 43.6 fl      MPV 10.3 fL      Platelets 167 10*3/mm3      Neutrophil % 48.4 %       Lymphocyte % 36.5 %      Monocyte % 9.6 %      Eosinophil % 3.6 %      Basophil % 1.7 %      Immature Grans % 0.2 %      Neutrophils, Absolute 2.81 10*3/mm3      Lymphocytes, Absolute 2.12 10*3/mm3      Monocytes, Absolute 0.56 10*3/mm3      Eosinophils, Absolute 0.21 10*3/mm3      Basophils, Absolute 0.10 10*3/mm3      Immature Grans, Absolute 0.01 10*3/mm3      nRBC 0.0 /100 WBC     Urinalysis With Microscopic If Indicated (No Culture) - Urine, Clean Catch [[id number]]  (Normal) Collected:  03/02/19 2337    Specimen:  Urine, Clean Catch Updated:  03/02/19 2353     Color, UA Yellow     Appearance, UA Clear     pH, UA 6.5     Specific Gravity, UA 1.007     Glucose, UA Negative     Ketones, UA Negative     Bilirubin, UA Negative     Blood, UA Negative     Protein, UA Negative     Leuk Esterase, UA Negative     Nitrite, UA Negative     Urobilinogen, UA 0.2 E.U./dL    Narrative:       Urine microscopic not indicated.          I ordered the above labs and reviewed the results    RADIOLOGY  CT Head Without Contrast   Final Result   No acute intracranial process identified.       Radiation dose reduction techniques were utilized, including automated   exposure control and exposure modulation based on body size.       This report was finalized on 3/3/2019 12:37 AM by Dr. Luz Elena Gonzalez M.D.          XR Chest 2 View   Final Result   Chronic scarring identified at the lung bases. No acute findings.       This report was finalized on 3/3/2019 12:34 AM by Dr. Luz Elena Gonzalez M.D.               I ordered the above noted radiological studies. Interpreted by radiologist. Reviewed by me in PACS.       PROCEDURES  Procedures  EKG    EKG time: 2306  Rhythm/Rate: Afib, 102  No Acute Ischemia  Non-Specific ST-T changes    Changed compared to prior on 8/2017, when pt was in NSR    Interpreted Contemporaneously by me.  Independently viewed by me      PROGRESS AND CONSULTS     2331 Ordered CMP, PT-INR, lipase, UA, troponin,  magnesium, TSH, T4, CBC, CXR, and CT head (due to head pain and ear buzzing) for further evaluation.     0014 Ordered potassium chloride for pt's potassium of 3.7 while in Afib.     0113 Rechecked the pt who is resting and in no distress. Discussed the pt's negative head CT as well as unremarkable labs. Discussed that I ordered the pt potassium since his level was on the lower limits of normal. Discussed the plan to speak with cardiology about the pt. The pt agrees with the plan.  The pt is still in Afib with rate in 70s and 80s.     0119 [place] [facility]     0156 Discussed the plan to discharge the pt. Discussed that the pt should continue to take his medications as prescribed, and he can take metoprolol as needed. Discussed that the pt should f/u with his cardiologist on Monday. The pt agrees with the plan and all questions were addressed.     MEDICAL DECISION MAKING  Results were reviewed/discussed with the patient and they were also made aware of online access. Pt also made aware that some labs, such as cultures, will not be resulted during ER visit and follow up with PMD is necessary.     MDM  Number of Diagnoses or Management Options  Paroxysmal A-fib (CMS/HCC):      Amount and/or Complexity of Data Reviewed  Clinical lab tests: ordered and reviewed (Troponin negative, potassium: 3.7)  Tests in the radiology section of CPT®: ordered and reviewed (CXR: Nothing acute, chronic scarring  CT head: Nothing acute)  Tests in the medicine section of CPT®: ordered and reviewed (See EKG)  Decide to obtain previous medical records or to obtain history from someone other than the patient: yes  Review and summarize past medical records: yes (The pt was last seen by Dr. Montero in 11/2017. The pt has a h/o Afib and CAD. The pt is on Eliquis and toprol. )    Patient Progress  Patient progress: improved         DIAGNOSIS  Final diagnoses:   Paroxysmal A-fib (CMS/HCC)       DISPOSITION  Disposition: Discharged.    Patient  discharged in stable condition.    Reviewed implications of results, diagnosis, meds, responsibility to follow up, warning signs and symptoms of possible worsening, potential complications and reasons to return to ER, including new or worsening symptoms.    Patient/Family voiced understanding of above instructions.    Discussed plan for discharge, as there is no emergent indication for admission.  Pt/family is agreeable and understands need for follow up and repeat testing.  Pt is aware that discharge does not mean that nothing is wrong but it indicates no emergency is present and they must continue care with follow-up as given below or physician of their choice.     FOLLOW-UP  Keo Montero MD  [address] [address]  [phone]    In 2 days  For recheck         Medication List      Changed    amLODIPine 5 MG tablet  Commonly known as:  NORVASC  Take 1 tablet by mouth Daily.  What changed:  additional instructions            Latest Documented Vital Signs:  As of 1:59 AM  BP- 113/65 HR- 78 Temp- 96.4 °F (35.8 °C) (Tympanic) O2 sat- 95%    --  Documentation assistance provided by randall Caballero for Dr. Gibbons.  Information recorded by the scribe was done at my direction and has been verified and validated by me.     Christiana Caballero  03/02/19 5817       Christiana Caballero  03/03/19 9854       Schuyler Gibbons MD  03/03/19 5944

## 2019-03-21 ENCOUNTER — OFFICE VISIT (OUTPATIENT)
Dept: CARDIOLOGY | Facility: CLINIC | Age: 74
End: 2019-03-21

## 2019-03-21 VITALS
BODY MASS INDEX: 28.2 KG/M2 | HEART RATE: 48 BPM | WEIGHT: 197 LBS | HEIGHT: 70 IN | DIASTOLIC BLOOD PRESSURE: 74 MMHG | SYSTOLIC BLOOD PRESSURE: 118 MMHG

## 2019-03-21 DIAGNOSIS — I25.10 CORONARY ARTERY DISEASE INVOLVING NATIVE CORONARY ARTERY OF NATIVE HEART WITHOUT ANGINA PECTORIS: ICD-10-CM

## 2019-03-21 DIAGNOSIS — I10 ESSENTIAL HYPERTENSION: ICD-10-CM

## 2019-03-21 DIAGNOSIS — I48.0 PAF (PAROXYSMAL ATRIAL FIBRILLATION) (HCC): Primary | ICD-10-CM

## 2019-03-21 PROCEDURE — 99214 OFFICE O/P EST MOD 30 MIN: CPT | Performed by: INTERNAL MEDICINE

## 2019-03-21 PROCEDURE — 93000 ELECTROCARDIOGRAM COMPLETE: CPT | Performed by: INTERNAL MEDICINE

## 2019-03-21 RX ORDER — DOXYCYCLINE HYCLATE 100 MG/1
100 CAPSULE ORAL 2 TIMES DAILY
Refills: 0 | COMMUNITY
Start: 2019-03-19 | End: 2019-08-05

## 2019-03-21 NOTE — PROGRESS NOTES
Subjective:     Encounter Date:03/21/19      Patient ID: Carlo Archer is a 74 y.o. male.    Chief Complaint:  Atrial Fibrillation   Presents for follow-up visit. Symptoms are negative for chest pain, palpitations and shortness of breath. The symptoms have been stable. Past medical history includes atrial fibrillation and CAD.   Hypertension   This is a chronic problem. The current episode started more than 1 year ago. Pertinent negatives include no chest pain, palpitations or shortness of breath.   Coronary Artery Disease   Presents for follow-up visit. Pertinent negatives include no chest pain, chest pressure, chest tightness, palpitations or shortness of breath.       74-year old gentleman who presents today for reevaluation.  Patient had an episode of atrial fibrillation about 6 weeks ago.  He has had none further.  He did take his extra beta-blocker but did not stop and rest.  Patient has not had any further episodes.  Unfortunately he has gotten some allergy issues here recently.  He had a dose of prednisone prescribed but did not take it was seen first.  He denies chest pain he is been coughing a little lightheadedness finding some allergies he has just completed his Z-Zen.  He has lost about 17 pounds since I saw him last.  He did cut his ARB in half because he was getting dizzy and lightheaded.        Review of Systems   Cardiovascular: Negative for chest pain and palpitations.   Respiratory: Negative for chest tightness and shortness of breath.    All other systems reviewed and are negative.        ECG 12 Lead  Date/Time: 3/21/2019 10:29 AM  Performed by: Keo Montero MD  Authorized by: Keo Montero MD   Comparison: compared with previous ECG from 3/2/2019  Comparison to previous ECG: Was in afib  Rhythm: sinus bradycardia    Clinical impression: normal ECG               Objective:     Physical Exam   Constitutional: He is oriented to person, place, and time. He appears well-developed.    HENT:   Head: Normocephalic.   Eyes: Conjunctivae are normal.   Neck: Normal range of motion.   Cardiovascular: Normal rate, regular rhythm and normal heart sounds.   Pulmonary/Chest: Breath sounds normal.   Abdominal: Soft. Bowel sounds are normal.   Musculoskeletal: Normal range of motion. He exhibits no edema.   Neurological: He is alert and oriented to person, place, and time.   Skin: Skin is warm and dry.   Psychiatric: He has a normal mood and affect. His behavior is normal.   Vitals reviewed.      Lab Review:       Assessment:          Diagnosis Plan   1. PAF (paroxysmal atrial fibrillation) (CMS/HCC)     2. Coronary artery disease involving native coronary artery of native heart without angina pectoris     3. Essential hypertension            Plan:       1.  Paroxysmal atrial  fibrillation.  Remains in sinus rhythm on anticoagulation.  2.  Coronary artery disease stable no issues  3.  Hypertension.  He has lost quite a bit of weight.  Going to try to drop his Norvasc off altogether.  If his blood pressure goes up I would just go up on his valsartan.  4.  I told was okay to take his Medrol Dosepak watch his blood pressure.  Drop off the Norvasc will reassess in 6 months sooner if we need to.    Atrial Fibrillation and Atrial Flutter  Assessment  • The patient has paroxysmal atrial fibrillation  • This is non-valvular in etiology  • The patient's CHADS2-VASc score is 2  • A [id number] score of 2 or more is considered a high risk for a thromboembolic event  • Apixaban prescribed    Plan  • Attempt to maintain sinus rhythm  • Continue aspirin for antithrombotic therapy, bleeding issues discussed  • Continue beta blocker for rhythm control      Coronary Artery Disease  Assessment  • The patient has no angina    Plan  • Lifestyle modifications discussed include adhering to a heart healthy diet, avoidance of tobacco products, maintenance of a healthy weight, medication compliance, regular exercise and regular  monitoring of cholesterol and blood pressure    Subjective - Objective  • Current antiplatelet therapy includes aspirin 81 mg

## 2019-08-05 ENCOUNTER — OFFICE VISIT (OUTPATIENT)
Dept: ORTHOPEDIC SURGERY | Facility: CLINIC | Age: 74
End: 2019-08-05

## 2019-08-05 VITALS — HEIGHT: 70 IN | WEIGHT: 202.6 LBS | BODY MASS INDEX: 29.01 KG/M2 | TEMPERATURE: 98.2 F

## 2019-08-05 DIAGNOSIS — M70.61 TROCHANTERIC BURSITIS OF BOTH HIPS: ICD-10-CM

## 2019-08-05 DIAGNOSIS — Z96.641 STATUS POST TOTAL REPLACEMENT OF RIGHT HIP: ICD-10-CM

## 2019-08-05 DIAGNOSIS — M25.551 PAIN OF BOTH HIP JOINTS: Primary | ICD-10-CM

## 2019-08-05 DIAGNOSIS — M70.62 TROCHANTERIC BURSITIS OF BOTH HIPS: ICD-10-CM

## 2019-08-05 DIAGNOSIS — M25.552 PAIN OF BOTH HIP JOINTS: Primary | ICD-10-CM

## 2019-08-05 PROCEDURE — 99204 OFFICE O/P NEW MOD 45 MIN: CPT | Performed by: ORTHOPAEDIC SURGERY

## 2019-08-05 PROCEDURE — 73521 X-RAY EXAM HIPS BI 2 VIEWS: CPT | Performed by: ORTHOPAEDIC SURGERY

## 2019-08-05 RX ORDER — MELOXICAM 15 MG/1
15 TABLET ORAL DAILY
Refills: 0 | COMMUNITY
Start: 2019-07-19

## 2019-08-05 NOTE — PROGRESS NOTES
"Patient Name: Carlo Archer   YOB: 1945  Referring Primary Care Physician: Enrique Moe MD  BMI: Body mass index is 29.07 kg/m².    Chief Complaint:    Chief Complaint   Patient presents with   • Left Hip - Establish Care, Pain   • Right Hip - Establish Care, Pain        HPI:     Carlo Archer is a 74 y.o. male who presents today for evaluation of   Chief Complaint   Patient presents with   • Left Hip - Establish Care, Pain   • Right Hip - Establish Care, Pain   .  Urban is a 74-year-old male who is active and races cars.  He owns [employer].  He reports that he has had off-and-on bilateral hip pain for years.  He had a cholecystectomy in January and came off of his meloxicam and Nexium.  He said that really started to hurt then but when he takes his Mobic it helps.  He had a right total hip arthroplasty in 2007 by Dr. No.,  Said he went to get checked to make sure there is \"nothing serious going on\" he does say that he had the \"recalled hip\" and had metal levels checked for years of Dr. Coleman's office they finally stopped doing them.  No groin pain or activity related pain is noted no fevers chills etc.    This problem is new to this examiner.     Subjective   Medications:   Home Medications:  Current Outpatient Medications on File Prior to Visit   Medication Sig   • apixaban (ELIQUIS) 5 MG tablet tablet Take 1 tablet by mouth Every 12 (Twelve) Hours.   • atorvastatin (LIPITOR) 40 MG tablet take 1 tablet by mouth at bedtime   • bimatoprost (LUMIGAN) 0.03 % ophthalmic drops Apply  to eye Every Night.   • BIOTIN PO Take 5,000 mg by mouth Daily.   • cetirizine (zyrTEC) 10 MG tablet Take 10 mg by mouth As Needed.   • Cyanocobalamin (VITAMIN B-12 PO) Take  by mouth Daily.   • EPINEPHrine (EPIPEN 2-MARY) 0.3 MG/0.3ML solution auto-injector injection EpiPen TRINY; Patient Sig: Amairani AGOSTO ; 0; 06-Jan-2015; Active   • esomeprazole (NEXIUM) 40 MG capsule Take  by mouth As Needed.   • " fluticasone (FLONASE) 50 MCG/ACT nasal spray into each nostril As Needed.   • meloxicam (MOBIC) 15 MG tablet    • metoprolol succinate XL (TOPROL-XL) 25 MG 24 hr tablet take 1 tablet by mouth once daily   • NIACIN PO Take 1,000 mg by mouth Daily.   • tadalafil (CIALIS) 5 MG tablet Take 5 mg by mouth Daily As Needed.   • tamsulosin (FLOMAX) 0.4 MG capsule 24 hr capsule Take 1 capsule by mouth 2 (Two) Times a Day.   • valsartan (DIOVAN) 320 MG tablet Take 320 mg by mouth Daily. Pt taking 1/2 daily   • amLODIPine (NORVASC) 5 MG tablet Take 1 tablet by mouth Daily. (Patient taking differently: Take 5 mg by mouth Daily. taking 1/2)   • [DISCONTINUED] doxycycline (VIBRAMYCIN) 100 MG capsule Take 100 mg by mouth 2 (Two) Times a Day.     No current facility-administered medications on file prior to visit.      Current Medications:  Scheduled Meds:  Continuous Infusions:  No current facility-administered medications for this visit.   PRN Meds:.    I have reviewed the patient's medical history in detail and updated the computerized patient record.  Review and summarization of old records includes:    Past Medical History:   Diagnosis Date   • CAD (coronary artery disease)    • Hypertension    • PAF (paroxysmal atrial fibrillation) (CMS/[facility])         Past Surgical History:   Procedure Laterality Date   • CHOLECYSTECTOMY     • CORONARY ANGIOPLASTY     • JOINT REPLACEMENT Right     hip   • TONSILLECTOMY     • VASECTOMY          Social History     Occupational History   • Not on file   Tobacco Use   • Smoking status: Former Smoker   • Smokeless tobacco: Never Used   • Tobacco comment: caffeine use   Substance and Sexual Activity   • Alcohol use: Yes     Comment: occasional/ caffeine use 1 cup daily   • Drug use: No   • Sexual activity: Yes    Social History     Social History Narrative   • Not on file        Family History   Problem Relation Age of Onset   • Heart attack Father    • Hypertension Sister    • Heart disease Sister   "  • Sudden death Brother        ROS: 14 point review of systems was performed and all other systems were reviewed and are negative except for documented findings in HPI and today's encounter.     Allergies:   Allergies   Allergen Reactions   • Clindamycin Phosphate Anaphylaxis     Allergy to streptamycin    • Penicillin V Anaphylaxis   • Penicillins Anaphylaxis   • Streptomycin Anaphylaxis   • Bee Venom    • Levaquin [Levofloxacin] Myalgia     tendonitis     Constitutional:  Denies fever, shaking or chills   Eyes:  Denies change in visual acuity   HENT:  Denies nasal congestion or sore throat   Respiratory:  Denies cough or shortness of breath   Cardiovascular:  Denies chest pain or severe LE edema   GI:  Denies abdominal pain, nausea, vomiting, bloody stools or diarrhea   Musculoskeletal:  Numbness, tingling, pain, or loss of motor function only as noted above in history of present illness.  : Denies painful urination or hematuria  Integument:  Denies rash, lesion or ulceration   Neurologic:  Denies headache or focal weakness  Endocrine:  Denies lymphadenopathy  Psych:  Denies confusion or change in mental status   Hem:  Denies active bleeding    OBJECTIVE:  Physical Exam:   Temp 98.2 °F (36.8 °C) (Temporal)   Ht 177.8 cm (70\")   Wt 91.9 kg (202 lb 9.6 oz)   BMI 29.07 kg/m²     General Appearance:    Alert, cooperative, in no acute distress                  Eyes: conjunctiva clear  ENT: external ears and nose atraumatic  CV: no peripheral edema  Resp: normal respiratory effort  Skin: no rashes or wounds; normal turgor  Psych: mood and affect appropriate  Lymph: no nodes appreciated  Neuro: gross sensation intact  Vascular:  Palpable peripheral pulse in noted extremity  Musculoskeletal Extremities: Exam shows that he walks pretty well.  There is no real limp.  He is mildly tender over both trochanteric bursa's.  Stinchfield's bilaterally negative there is no tenderness with axial loading.  Left hip has some " decreased internal rotation with mild discomfort but nothing significant.    Radiology:   P of the hips lateral bilateral hips taken the office today without comparison views available show an ASR hip on the right that appears to be in good position with good fixation.  He has mild to moderate arthritic change on the left    Assessment:     ICD-10-CM ICD-9-CM   1. Pain of both hip joints M25.551 719.45    M25.552    2. Status post total replacement of right hip Z96.641 V43.64   3. Trochanteric bursitis of both hips M70.61 726.5    M70.62         Procedures       Plan: Biomechanics of pertinent body area discussed.  Risks, benefits, alternatives, comparisons, and complications of accepted medicines, injections, recommendations, surgical procedures, and therapies explained and education provided in laymen's terms. Natural history and expected course of this patient's diagnosis discussed along with evaluation of therapies. Questions answered. When appropriate I also discussed proper use of cane, walker, trekking poles. I discussed it with him since he has an ESR hip I recommend a metal levels and we will send him for those.  He said that he would try to get copies of them from Dr. Stovall office if still available so we should could compare.  He does have some trochanteric bursitis type symptoms and I discussed with him possibility of therapy or further work-up and suspicion it may be coming out of his back.  He says as long as he takes his Mobic is fine and he does not want to pursue any more work-up at this time.  See what the metal levels show and go from there      8/5/2019    Much of this encounter note is an electronic transcription/translation of spoken language to printed text. The electronic translation of spoken language may permit erroneous, or at times, nonsensical words or phrases to be inadvertently transcribed; Although I have reviewed the note for such errors, some may still exist

## 2019-12-31 ENCOUNTER — TELEPHONE (OUTPATIENT)
Dept: CARDIOLOGY | Facility: CLINIC | Age: 74
End: 2019-12-31

## 2019-12-31 NOTE — TELEPHONE ENCOUNTER
Called and spoke with patient's wife. Went over Fely's recommendations. She verbalized understanding.    Thank you!    Avelina Parish RN  Triage [facility] [place]

## 2019-12-31 NOTE — TELEPHONE ENCOUNTER
"12/31/19  11:44 AM  Carlo Archer  1945  Home Phone [phone]   Mobile [phone]     Carlo Geiger has been on prednisone for bronchitis for the past two weeks. He has two days left. Yesterday evening he started having palpitations, felt lightheaded and sweaty. He normally takes Metoprolol 25 mg in the morning. He took an extra dose yesterday evening when that started and was able to go to sleep + get a good night's sleep.     He has been resting this morning, but still felt like he was in a-fib. So, he took two metoprolol doses this morning. He feels like he is coming out of a-fib now and feels like he can get up and around.    Cardiac-related medications:  Toprol XL 25 mg daily  Amlodipine 2.5 mg daily  Atorvastatin  Eliquis 5mg daily  Valsartan 320 mg 1/2 tab daily    He wanted to know if he should finish off the two days of the prednisone? I told his wife \"yes\" he should (because he has had bronchitis). Do you have anything to add?    Thank you!    Avelina Parish RN  Triage [facility]    "

## 2019-12-31 NOTE — TELEPHONE ENCOUNTER
He may want to continue to take an extra dose of metoprolol while on prednisone as it could be increasing his A. fib tendency.  If he continues to have rate problems after completing prednisone he should schedule an appointment with me.

## 2020-01-01 ENCOUNTER — HOSPITAL ENCOUNTER (EMERGENCY)
Facility: HOSPITAL | Age: 75
Discharge: HOME | End: 2020-01-01
Attending: EMERGENCY MEDICINE | Admitting: EMERGENCY MEDICINE

## 2020-01-01 VITALS
DIASTOLIC BLOOD PRESSURE: 87 MMHG | BODY MASS INDEX: 29.35 KG/M2 | SYSTOLIC BLOOD PRESSURE: 123 MMHG | RESPIRATION RATE: 18 BRPM | OXYGEN SATURATION: 96 % | HEIGHT: 70 IN | HEART RATE: 103 BPM | WEIGHT: 205 LBS | TEMPERATURE: 96.5 F

## 2020-01-01 DIAGNOSIS — I10 POORLY-CONTROLLED HYPERTENSION: ICD-10-CM

## 2020-01-01 DIAGNOSIS — I48.91 ATRIAL FIBRILLATION WITH RVR (HCC): Primary | ICD-10-CM

## 2020-01-01 LAB
ALBUMIN SERPL-MCNC: 4.2 G/DL (ref 3.5–5.2)
ALBUMIN/GLOB SERPL: 1.4 G/DL
ALP SERPL-CCNC: 60 U/L (ref 39–117)
ALT SERPL W P-5'-P-CCNC: 20 U/L (ref 1–41)
ANION GAP SERPL CALCULATED.3IONS-SCNC: 11.3 MMOL/L (ref 5–15)
AST SERPL-CCNC: 24 U/L (ref 1–40)
BASOPHILS # BLD AUTO: 0.06 10*3/MM3 (ref 0–0.2)
BASOPHILS NFR BLD AUTO: 0.6 % (ref 0–1.5)
BILIRUB SERPL-MCNC: 0.6 MG/DL (ref 0.2–1.2)
BUN BLD-MCNC: 15 MG/DL (ref 8–23)
BUN/CREAT SERPL: 18.3 (ref 7–25)
CALCIUM SPEC-SCNC: 8.9 MG/DL (ref 8.6–10.5)
CHLORIDE SERPL-SCNC: 103 MMOL/L (ref 98–107)
CO2 SERPL-SCNC: 24.7 MMOL/L (ref 22–29)
CREAT BLD-MCNC: 0.82 MG/DL (ref 0.76–1.27)
DEPRECATED RDW RBC AUTO: 42.8 FL (ref 37–54)
EOSINOPHIL # BLD AUTO: 0.22 10*3/MM3 (ref 0–0.4)
EOSINOPHIL NFR BLD AUTO: 2.3 % (ref 0.3–6.2)
ERYTHROCYTE [DISTWIDTH] IN BLOOD BY AUTOMATED COUNT: 13.1 % (ref 12.3–15.4)
GFR SERPL CREATININE-BSD FRML MDRD: 92 ML/MIN/1.73
GLOBULIN UR ELPH-MCNC: 2.9 GM/DL
GLUCOSE BLD-MCNC: 95 MG/DL (ref 65–99)
HCT VFR BLD AUTO: 43.8 % (ref 37.5–51)
HGB BLD-MCNC: 14.7 G/DL (ref 13–17.7)
IMM GRANULOCYTES # BLD AUTO: 0.03 10*3/MM3 (ref 0–0.05)
IMM GRANULOCYTES NFR BLD AUTO: 0.3 % (ref 0–0.5)
LYMPHOCYTES # BLD AUTO: 2.56 10*3/MM3 (ref 0.7–3.1)
LYMPHOCYTES NFR BLD AUTO: 27.2 % (ref 19.6–45.3)
MAGNESIUM SERPL-MCNC: 2 MG/DL (ref 1.6–2.4)
MCH RBC QN AUTO: 30.2 PG (ref 26.6–33)
MCHC RBC AUTO-ENTMCNC: 33.6 G/DL (ref 31.5–35.7)
MCV RBC AUTO: 90.1 FL (ref 79–97)
MONOCYTES # BLD AUTO: 0.81 10*3/MM3 (ref 0.1–0.9)
MONOCYTES NFR BLD AUTO: 8.6 % (ref 5–12)
NEUTROPHILS # BLD AUTO: 5.73 10*3/MM3 (ref 1.7–7)
NEUTROPHILS NFR BLD AUTO: 61 % (ref 42.7–76)
NRBC BLD AUTO-RTO: 0 /100 WBC (ref 0–0.2)
NT-PROBNP SERPL-MCNC: 903.9 PG/ML (ref 5–1800)
PLATELET # BLD AUTO: 184 10*3/MM3 (ref 140–450)
PMV BLD AUTO: 9.6 FL (ref 6–12)
POTASSIUM BLD-SCNC: 3.8 MMOL/L (ref 3.5–5.2)
PROT SERPL-MCNC: 7.1 G/DL (ref 6–8.5)
RBC # BLD AUTO: 4.86 10*6/MM3 (ref 4.14–5.8)
SODIUM BLD-SCNC: 139 MMOL/L (ref 136–145)
TROPONIN T SERPL-MCNC: <0.01 NG/ML (ref 0–0.03)
WBC NRBC COR # BLD: 9.41 10*3/MM3 (ref 3.4–10.8)

## 2020-01-01 PROCEDURE — 83880 ASSAY OF NATRIURETIC PEPTIDE: CPT | Performed by: EMERGENCY MEDICINE

## 2020-01-01 PROCEDURE — 85025 COMPLETE CBC W/AUTO DIFF WBC: CPT | Performed by: EMERGENCY MEDICINE

## 2020-01-01 PROCEDURE — 93010 ELECTROCARDIOGRAM REPORT: CPT | Performed by: INTERNAL MEDICINE

## 2020-01-01 PROCEDURE — 93005 ELECTROCARDIOGRAM TRACING: CPT

## 2020-01-01 PROCEDURE — 93005 ELECTROCARDIOGRAM TRACING: CPT | Performed by: EMERGENCY MEDICINE

## 2020-01-01 PROCEDURE — 80053 COMPREHEN METABOLIC PANEL: CPT | Performed by: EMERGENCY MEDICINE

## 2020-01-01 PROCEDURE — 99284 EMERGENCY DEPT VISIT MOD MDM: CPT

## 2020-01-01 PROCEDURE — 96374 THER/PROPH/DIAG INJ IV PUSH: CPT

## 2020-01-01 PROCEDURE — 83735 ASSAY OF MAGNESIUM: CPT | Performed by: EMERGENCY MEDICINE

## 2020-01-01 PROCEDURE — 84484 ASSAY OF TROPONIN QUANT: CPT | Performed by: EMERGENCY MEDICINE

## 2020-01-01 RX ORDER — LABETALOL HYDROCHLORIDE 5 MG/ML
10 INJECTION, SOLUTION INTRAVENOUS ONCE
Status: DISCONTINUED | OUTPATIENT
Start: 2020-01-01 | End: 2020-01-01

## 2020-01-01 RX ORDER — CLONIDINE HYDROCHLORIDE 0.1 MG/1
0.1 TABLET ORAL ONCE
Status: COMPLETED | OUTPATIENT
Start: 2020-01-01 | End: 2020-01-01

## 2020-01-01 RX ORDER — SODIUM CHLORIDE 0.9 % (FLUSH) 0.9 %
10 SYRINGE (ML) INJECTION AS NEEDED
Status: DISCONTINUED | OUTPATIENT
Start: 2020-01-01 | End: 2020-01-01 | Stop reason: HOSPADM

## 2020-01-01 RX ORDER — METOPROLOL SUCCINATE 25 MG/1
25 TABLET, EXTENDED RELEASE ORAL ONCE
Status: COMPLETED | OUTPATIENT
Start: 2020-01-01 | End: 2020-01-01

## 2020-01-01 RX ADMIN — METOPROLOL SUCCINATE 25 MG: 25 TABLET, EXTENDED RELEASE ORAL at 12:03

## 2020-01-01 RX ADMIN — METOPROLOL TARTRATE 5 MG: 5 INJECTION, SOLUTION INTRAVENOUS at 10:13

## 2020-01-01 RX ADMIN — CLONIDINE HYDROCHLORIDE 0.1 MG: 0.1 TABLET ORAL at 11:57

## 2020-01-01 RX ADMIN — SODIUM CHLORIDE 500 ML: 9 INJECTION, SOLUTION INTRAVENOUS at 10:12

## 2020-01-01 NOTE — ED PROVIDER NOTES
" EMERGENCY DEPARTMENT ENCOUNTER    CHIEF COMPLAINT  Chief Complaint: palpitations  History given by: patient  History limited by: nothing  Room Number: 13/13  PMD: Enrique Moe MD      HPI:  Pt is a 74 y.o. male with history of paroxysmal atrial fibrillation who presents to the ED via private vheicle complaining of gradual onset, intermittent, worsening \"skipping\" palpitations starting two nights ago.  Denies aggravating or alleviating factors. Also complains of diaphoresis through the night, as well as lightheadedness and dizziness for the past week. Family at bedside reports pt having hypertension yesterday, which pt states he took Norvasc for, which lowered his blood pressure. Denies chest pain or SOA. Pt was recently on Prednisone for URI, started 5 days ago. Pt also was on Tamiflu, but stopped after four days.. Also reports last taking his Toprol and Diovan around an hour ago. Pt is anticoagulated on Eliquis. Followed by Dr. Wright's office.    PAST MEDICAL HISTORY  Active Ambulatory Problems     Diagnosis Date Noted   • New onset atrial fibrillation (CMS/HCC) 01/10/2017   • Hypertension 11/15/2017   • CAD (coronary artery disease) 11/15/2017   • PAF (paroxysmal atrial fibrillation) (CMS/HCC) 11/15/2017     Resolved Ambulatory Problems     Diagnosis Date Noted   • No Resolved Ambulatory Problems     No Additional Past Medical History       PAST SURGICAL HISTORY  Past Surgical History:   Procedure Laterality Date   • CHOLECYSTECTOMY     • CORONARY ANGIOPLASTY     • JOINT REPLACEMENT Right     hip   • TONSILLECTOMY     • VASECTOMY         FAMILY HISTORY  Family History   Problem Relation Age of Onset   • Heart attack Father    • Hypertension Sister    • Heart disease Sister    • Sudden death Brother        SOCIAL HISTORY  Social History     Socioeconomic History   • Marital status:      Spouse name: Not on file   • Number of children: Not on file   • Years of education: Not on file   • " "Highest education level: Not on file   Tobacco Use   • Smoking status: Former Smoker   • Smokeless tobacco: Never Used   • Tobacco comment: caffeine use   Substance and Sexual Activity   • Alcohol use: Yes     Comment: occasional/ caffeine use 1 cup daily   • Drug use: No   • Sexual activity: Yes       ALLERGIES  Clindamycin phosphate; Penicillin v; Penicillins; Streptomycin; Bee venom; and Levaquin [levofloxacin]    REVIEW OF SYSTEMS  Review of Systems   Constitutional: Positive for diaphoresis. Negative for activity change, appetite change and fever.   HENT: Negative for congestion and sore throat.    Eyes: Negative.    Respiratory: Negative for cough and shortness of breath.    Cardiovascular: Positive for palpitations (\"skipping\"). Negative for chest pain and leg swelling.   Gastrointestinal: Negative for abdominal pain, diarrhea and vomiting.   Endocrine: Negative.    Genitourinary: Negative for decreased urine volume and dysuria.   Musculoskeletal: Negative for neck pain.   Skin: Negative for rash and wound.   Allergic/Immunologic: Negative.    Neurological: Positive for dizziness and light-headedness. Negative for weakness, numbness and headaches.   Hematological: Negative.    Psychiatric/Behavioral: Negative.    All other systems reviewed and are negative.    All systems reviewed and negative except for those discussed in HPI.    PHYSICAL EXAM  ED Triage Vitals [01/01/20 0943]   Temp Heart Rate Resp BP SpO2   96.5 °F (35.8 °C) 76 18 -- 98 %      Temp src Heart Rate Source Patient Position BP Location FiO2 (%)   Tympanic Monitor -- -- --       Physical Exam   Constitutional: He is oriented to person, place, and time. No distress.   HENT:   Head: Normocephalic and atraumatic.   Eyes: Pupils are equal, round, and reactive to light. EOM are normal.   Neck: Normal range of motion. Neck supple.   Cardiovascular: Normal heart sounds. An irregularly irregular rhythm present. Tachycardia present.   Pulmonary/Chest: " Effort normal and breath sounds normal. No respiratory distress.   Abdominal: Soft. There is no tenderness. There is no rebound and no guarding.   Musculoskeletal: Normal range of motion. He exhibits no edema.   Neurological: He is alert and oriented to person, place, and time. He has normal sensation and normal strength.   Skin: Skin is warm and dry.   Psychiatric: Mood and affect normal.   Nursing note and vitals reviewed.      LAB RESULTS  Lab Results (last 24 hours)     Procedure Component Value Units Date/Time    CBC & Differential [[id number]] Collected:  01/01/20 1005    Specimen:  Blood Updated:  01/01/20 1018    Narrative:       The following orders were created for panel order CBC & Differential.  Procedure                               Abnormality         Status                     ---------                               -----------         ------                     CBC Auto Differential[[id number]]        Normal              Final result                 Please view results for these tests on the individual orders.    Comprehensive Metabolic Panel [[id number]] Collected:  01/01/20 1005    Specimen:  Blood Updated:  01/01/20 1047     Glucose 95 mg/dL      BUN 15 mg/dL      Creatinine 0.82 mg/dL      Sodium 139 mmol/L      Potassium 3.8 mmol/L      Chloride 103 mmol/L      CO2 24.7 mmol/L      Calcium 8.9 mg/dL      Total Protein 7.1 g/dL      Albumin 4.20 g/dL      ALT (SGPT) 20 U/L      AST (SGOT) 24 U/L      Comment: Specimen hemolyzed.  Results may be affected.        Alkaline Phosphatase 60 U/L      Total Bilirubin 0.6 mg/dL      eGFR Non African Amer 92 mL/min/1.73      Globulin 2.9 gm/dL      A/G Ratio 1.4 g/dL      BUN/Creatinine Ratio 18.3     Anion Gap 11.3 mmol/L     Narrative:       GFR Normal >60  Chronic Kidney Disease <60  Kidney Failure <15      BNP [[id number]]  (Normal) Collected:  01/01/20 1005    Specimen:  Blood Updated:  01/01/20 1039     proBNP 903.9 pg/mL     Narrative:       Among  patients with dyspnea, NT-proBNP is highly sensitive for the detection of acute congestive heart failure. In addition NT-proBNP of <300 pg/ml effectively rules out acute congestive heart failure with 99% negative predictive value.      Troponin [[id number]]  (Normal) Collected:  01/01/20 1005    Specimen:  Blood Updated:  01/01/20 1040     Troponin T <0.010 ng/mL     Narrative:       Troponin T Reference Range:  <= 0.03 ng/mL-   Negative for AMI  >0.03 ng/mL-     Abnormal for myocardial necrosis.  Clinicians would have to utilize clinical acumen, EKG, Troponin and serial changes to determine if it is an Acute Myocardial Infarction or myocardial injury due to an underlying chronic condition.     Magnesium [[id number]]  (Normal) Collected:  01/01/20 1005    Specimen:  Blood Updated:  01/01/20 1040     Magnesium 2.0 mg/dL     CBC Auto Differential [[id number]]  (Normal) Collected:  01/01/20 1005    Specimen:  Blood Updated:  01/01/20 1018     WBC 9.41 10*3/mm3      RBC 4.86 10*6/mm3      Hemoglobin 14.7 g/dL      Hematocrit 43.8 %      MCV 90.1 fL      MCH 30.2 pg      MCHC 33.6 g/dL      RDW 13.1 %      RDW-SD 42.8 fl      MPV 9.6 fL      Platelets 184 10*3/mm3      Neutrophil % 61.0 %      Lymphocyte % 27.2 %      Monocyte % 8.6 %      Eosinophil % 2.3 %      Basophil % 0.6 %      Immature Grans % 0.3 %      Neutrophils, Absolute 5.73 10*3/mm3      Lymphocytes, Absolute 2.56 10*3/mm3      Monocytes, Absolute 0.81 10*3/mm3      Eosinophils, Absolute 0.22 10*3/mm3      Basophils, Absolute 0.06 10*3/mm3      Immature Grans, Absolute 0.03 10*3/mm3      nRBC 0.0 /100 WBC           I ordered the above labs and reviewed the results.      PROGRESS AND CONSULTS    ED Course as of Jan 01 1441 Wed Jan 01, 2020   0950 Patient was last seen here in the ER in March 2019 for palpitations.  He was found to be in A. fib.  Patient was already taking Eliquis and Toprol at that time.    [WH]   0950 EKG          EKG time: 9:47  AM  Rhythm/Rate: Atrial fibrillation rate 122  P waves and CA: Irregular, varying  QRS, axis: Inferior Q waves, normal axis  ST and T waves: Nonspecific ST changes laterally    Interpreted Contemporaneously by me, independently viewed  EKG is not significantly changed compared to prior EKG done 3/2/2019      []   1137 Case discussed with Dr. Camargo.  He recommends doubling the patient's Toprol-XL dose to 50 mg daily.  He also recommends that the patient hold his Norvasc until he can follow-up in the office.    []   1148 I informed patient and his wife of my discussion with Dr. Camargo and the plan to increase his Toprol to 50 mg daily.  Patient's heart rate remains in the 80s but his blood pressure is 140/115.  Patient will be given an oral dose of metoprolol prior to discharge.    []      ED Course User Index  [] Gordon Velázquez MD     0955- 's to 130's. O2 sat 97% on RA. /109. Discussed with pt and family plan to obtain labs and treat Afib with IVF and Lopressor. Pt understands and agrees with the plan, all questions answered. Ordered labs for further evaluation. Also ordered IVF and Lopressor for symptom management.    1125- , still in Afib. O2 sat 91% on RA. /115. Rechecked pt. Pt is resting comfortably. Notified pt of negative acute work up so far. Discussed the plan to consult with cardiology for further evaluation. Pt understands and agrees with the plan, all questions answered. Placed call out to cardiology for consult.    1149- Ordered Catapres and Toprol-XL for symptom management.    1257- Ordered Labetalol for BP management.    1351- Per RN, pt's BP is now 120/102.    Critical Care  Performed by: Gordon Velázquez MD  Authorized by: Gordon Velázquez MD     Critical care provider statement:     Critical care time (minutes):  30    Critical care time was exclusive of:  Separately billable procedures and treating other patients    Critical care was necessary to treat  or prevent imminent or life-threatening deterioration of the following conditions:  Cardiac failure and circulatory failure    Critical care was time spent personally by me on the following activities:  Development of treatment plan with patient or surrogate, discussions with consultants, examination of patient, evaluation of patient's response to treatment, obtaining history from patient or surrogate, ordering and review of laboratory studies, ordering and performing treatments and interventions, pulse oximetry, re-evaluation of patient's condition and review of old charts        MEDICAL DECISION MAKING  Results were reviewed/discussed with the patient and they were also made aware of online access. Pt also made aware that some labs, such as cultures, will not be resulted during ER visit and follow up with PMD is necessary.        Patient presented to the ER complaining of palpitations.  He was found to be in A. fib.  He does have a history of paroxysmal A. fib and is on Eliquis.  He was initially tachycardic but his rate improved with IV Lopressor.  Labs were unremarkable.  Patient remained hypertensive and was given Toprol-XL and clonidine.  His blood pressure subsequently improved.  Case was discussed with Dr. Camargo.  Patient was advised to double the dose of his Toprol XL to 50 mg daily and to follow-up with Dr. Montero soon as possible.  Critical care was performed on this patient.    DIAGNOSIS  Final diagnoses:   Atrial fibrillation with RVR (CMS/HCC)   Poorly-controlled hypertension       DISPOSITION  DISCHARGE    Patient discharged in stable condition.    Reviewed implications of results, diagnosis, meds, responsibility to follow up, warning signs and symptoms of possible worsening, potential complications and reasons to return to ER, including worsening symptoms.    Patient/Family voiced understanding of above instructions.    Discussed plan for discharge, as there is no emergent indication for  admission. Patient referred to primary care provider for BP management due to today's BP. Pt/family is agreeable and understands need for follow up and repeat testing.  Pt is aware that discharge does not mean that nothing is wrong but it indicates no emergency is present that requires admission and they must continue care with follow-up as given below or physician of their choice.     FOLLOW-UP  [facility] CARDIOLOGY  [address] [facility] [address] [phone]  Schedule an appointment as soon as possible for a visit            Medication List      Changed    amLODIPine 5 MG tablet  Commonly known as:  NORVASC  Take 1 tablet by mouth Daily.  What changed:  additional instructions              Latest Documented Vital Signs:  As of 2:41 PM  BP- 123/87 HR- 103 Temp- 96.5 °F (35.8 °C) (Tympanic) O2 sat- 96%    --  Documentation assistance provided by randall Cornejo for Dr. Velázquez.  Information recorded by the scribe was done at my direction and has been verified and validated by me.     Loren Cornejo  01/01/20 [id number]       Gordon Velázquez MD  01/01/20 [id number]

## 2020-01-01 NOTE — DISCHARGE INSTRUCTIONS
Increase your Toprol XL dose to 50 mg daily.  Taking Norvasc.  Follow-up with Dr. Montero as soon as possible.  Return to the emergency department for worsening symptoms, difficulty breathing, chest pain, dizziness, fainting, or other concern.

## 2020-01-02 ENCOUNTER — TELEPHONE (OUTPATIENT)
Dept: CARDIOLOGY | Facility: CLINIC | Age: 75
End: 2020-01-02

## 2020-01-02 NOTE — TELEPHONE ENCOUNTER
Dr. Montero is out today, would you please address this msg?    Pt's wife left msg saying her  was seen in the [facility] [place] ER yesterday for AFIB and was given some medication but still hasn't converted.  She said he has been in AFIB since Monday evening, still feels weak and woke up in a cold sweat today same as yesterday.  She is asking what they need to do?  Please advise.    Gabbie, pt's wife, # [phone]    Thanks,  Dunia

## 2020-01-02 NOTE — TELEPHONE ENCOUNTER
Spoke to pt's wife and she said when he first woke up /98,  HR 60's, took his Diovan and 1 hr later /87  HR 66.   Pt was told to stop Norvasc yesterday at the ER.    He is taking Diovan 320 MG daily and 50 MG of metoprolol daily(as of ER increase yesterday).  He just finished a medrol dose pk yesterday. They want to know if you will look at his EKG from yesterday and make sure he was in A-fib, they weren't sure the ER doctor really knew.  Please advise on what pt needs to do.   Thanks,  Dunia

## 2020-01-02 NOTE — TELEPHONE ENCOUNTER
Definitely  Was a fib with heart rate above 100. Would continue to follow BP and HR and if HR is below 100 then no additional metoprolol is needed. .

## 2020-01-02 NOTE — TELEPHONE ENCOUNTER
Called and spoke with patient.  He reported his heart rate is now 59.  He is resting on the couch feeling fine.  I explained to him that he has history of paroxysmal atrial fibrillation meaning he goes in and out of it.  I also explained that when he is in atrial fibrillation as long as he is still on his Eliquis he is protected against stroke.  He is to continue on metoprolol succinate 50 mg daily.  He can still take his trip and advised that he have enough supply of all of his medication.Also advised that he take his home blood pressure cuff there.   He plans to keep his appointment 1/14/2020.  He is to call with any further questions or concerns.

## 2020-01-02 NOTE — TELEPHONE ENCOUNTER
He is protected against stroke as he is on Eliquis. Without blood pressure and heart rate, I can not say it is safe to take additional metoprolol however, I see that was doubled in ED.

## 2020-01-02 NOTE — TELEPHONE ENCOUNTER
Spoke to pt's wife and gave her the msg.  She wants to know how long he can stay in AFib without it being a problem?  They are also supposed to fly out Sunday to [place] and wants to make sure it's ok to do that with being in A-Fib?

## 2020-01-10 NOTE — TELEPHONE ENCOUNTER
Gabbie Archer left msg about her , Carlo, saying they are in [place], [place] and he woke up at 7 AM feeling a little funny so went ahead and took his metoprolol.  He woke up again at 9:30 AM with a HR 130s so took a second metoprolol.   In the last hour his HR slowed down to the 80s but now is bk in the 130s.  Should he take another metoprolol?    Please call them @ [phone]      Thanks,  Dunia

## 2020-01-14 ENCOUNTER — OFFICE VISIT (OUTPATIENT)
Dept: CARDIOLOGY | Facility: CLINIC | Age: 75
End: 2020-01-14

## 2020-01-14 VITALS
OXYGEN SATURATION: 95 % | SYSTOLIC BLOOD PRESSURE: 166 MMHG | HEIGHT: 70 IN | BODY MASS INDEX: 30.21 KG/M2 | WEIGHT: 211 LBS | DIASTOLIC BLOOD PRESSURE: 102 MMHG | HEART RATE: 51 BPM

## 2020-01-14 DIAGNOSIS — I10 ESSENTIAL HYPERTENSION: ICD-10-CM

## 2020-01-14 DIAGNOSIS — I48.0 PAF (PAROXYSMAL ATRIAL FIBRILLATION) (HCC): Primary | ICD-10-CM

## 2020-01-14 DIAGNOSIS — I25.10 CORONARY ARTERY DISEASE INVOLVING NATIVE CORONARY ARTERY OF NATIVE HEART WITHOUT ANGINA PECTORIS: ICD-10-CM

## 2020-01-14 PROCEDURE — 99214 OFFICE O/P EST MOD 30 MIN: CPT | Performed by: INTERNAL MEDICINE

## 2020-01-15 NOTE — PROGRESS NOTES
Subjective:     Encounter Date:1/14/19      Patient ID: Carlo Archer is a 74 y.o. male.    Chief Complaint:  Atrial Fibrillation   Presents for follow-up visit. Symptoms are negative for chest pain, palpitations and shortness of breath. The symptoms have been stable. Past medical history includes atrial fibrillation and CAD.   Hypertension   This is a chronic problem. The current episode started more than 1 year ago. Pertinent negatives include no chest pain, palpitations or shortness of breath.   Coronary Artery Disease   Presents for follow-up visit. Pertinent negatives include no chest pain, chest pressure, chest tightness, palpitations or shortness of breath.     74-year-old gentleman seen today for reevaluation.  Patient says that he has been having more atrial fibrillation.  Actually coming from [place] he was on vacation and was going in and out of atrial fibrillation.  His wife has noticed that he is having more apnea symptoms.  He is also snoring differently.  He is recurrently has been more fatigued for the past 3 to 4 months.  He says that occasionally his stomach feels bloated.        Review of Systems   Cardiovascular: Negative for chest pain and palpitations.   Respiratory: Negative for chest tightness and shortness of breath.    All other systems reviewed and are negative.      Procedures         Objective:     Physical Exam   Constitutional: He is oriented to person, place, and time. He appears well-developed.   HENT:   Head: Normocephalic.   Eyes: Conjunctivae are normal.   Neck: Normal range of motion.   Cardiovascular: Normal rate, regular rhythm and normal heart sounds.   Pulmonary/Chest: Breath sounds normal.   Abdominal: Soft. Bowel sounds are normal.   Musculoskeletal: Normal range of motion. He exhibits no edema.   Neurological: He is alert and oriented to person, place, and time.   Skin: Skin is warm and dry.   Psychiatric: He has a normal mood and affect. His behavior is normal.    Vitals reviewed.      Lab Review:       Assessment:          Diagnosis Plan   1. PAF (paroxysmal atrial fibrillation) (CMS/[facility])  Ambulatory Referral to Sleep Medicine   2. Coronary artery disease involving native coronary artery of native heart without angina pectoris     3. Essential hypertension            Plan:       1.  Paroxysmal atrial  fibrillation.  Remains in sinus rhythm on anticoagulation.  2.  Coronary artery disease stable no issues  3.  Hypertension.  Patient's blood pressure has been drifting up and is elevated today at 166/102.  This would also go along with sleep apnea type symptoms.  4.  I am going to refer him to sleep apnea clinic for evaluation.  Meantime we will follow his blood pressure see if everything normalizes it was doing better but he went back to work and is catching up after being on vacation for several weeks.    Atrial Fibrillation and Atrial Flutter  Assessment  • The patient has paroxysmal atrial fibrillation  • This is non-valvular in etiology  • The patient's CHADS2-VASc score is 2  • A [id number] score of 2 or more is considered a high risk for a thromboembolic event  • Apixaban prescribed    Plan  • Attempt to maintain sinus rhythm  • Continue aspirin for antithrombotic therapy, bleeding issues discussed  • Continue beta blocker for rhythm control      Coronary Artery Disease  Assessment  • The patient has no angina    Plan  • Lifestyle modifications discussed include adhering to a heart healthy diet, avoidance of tobacco products, maintenance of a healthy weight, medication compliance, regular exercise and regular monitoring of cholesterol and blood pressure    Subjective - Objective  • Current antiplatelet therapy includes aspirin 81 mg

## 2020-03-11 ENCOUNTER — TELEPHONE (OUTPATIENT)
Dept: CARDIOLOGY | Facility: CLINIC | Age: 75
End: 2020-03-11

## 2020-03-11 NOTE — TELEPHONE ENCOUNTER
I called spoke with Gabbie.  Also told to hold his anticoagulation 2 to 3 days prior to the shot.  Risk of stroke is extremely low he has remained in sinus rhythm.  She verbally understood the risks.

## 2020-03-11 NOTE — TELEPHONE ENCOUNTER
The patients wife called to report that he is doing better and that he has not had ant more episodes of A-fib. She did say however that he has injured his should and the Orthopedic Dr mentioned that the first option is to use cortisone shots. The patient would like to possibly try the shots before any other option. The would like to know if the cortisone shot would put him back in A-fib?

## 2020-04-15 ENCOUNTER — APPOINTMENT (OUTPATIENT)
Dept: SLEEP MEDICINE | Facility: HOSPITAL | Age: 75
End: 2020-04-15

## 2020-05-19 ENCOUNTER — OFFICE (AMBULATORY)
Dept: URBAN - METROPOLITAN AREA CLINIC 64 | Facility: CLINIC | Age: 75
End: 2020-05-19

## 2020-05-19 VITALS
WEIGHT: 210 LBS | HEART RATE: 57 BPM | SYSTOLIC BLOOD PRESSURE: 133 MMHG | DIASTOLIC BLOOD PRESSURE: 77 MMHG | HEIGHT: 70 IN

## 2020-05-19 DIAGNOSIS — K85.10 BILIARY ACUTE PANCREATITIS WITHOUT NECROSIS OR INFECTION: ICD-10-CM

## 2020-05-19 DIAGNOSIS — R14.0 ABDOMINAL DISTENSION (GASEOUS): ICD-10-CM

## 2020-05-19 DIAGNOSIS — R19.4 CHANGE IN BOWEL HABIT: ICD-10-CM

## 2020-05-19 PROCEDURE — 99213 OFFICE O/P EST LOW 20 MIN: CPT | Performed by: INTERNAL MEDICINE

## 2020-05-19 RX ORDER — POLYETHYLENE GLYCOL 3350 17 G/17G
POWDER, FOR SOLUTION ORAL
Qty: 1 | Refills: 11 | Status: ACTIVE
Start: 2020-05-19

## 2020-07-17 ENCOUNTER — TELEPHONE (OUTPATIENT)
Dept: CARDIOLOGY | Facility: CLINIC | Age: 75
End: 2020-07-17

## 2020-07-17 NOTE — TELEPHONE ENCOUNTER
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   Surgery has been scheduled for 07-22-20.      Pt has appt with you on Monday, 07-20-20.    Pt is on Eliquis 5 MG BID    Fax is in your box to sign.   Please advise.    Thanks,  Dunia

## 2020-07-20 ENCOUNTER — OFFICE VISIT (OUTPATIENT)
Dept: CARDIOLOGY | Facility: CLINIC | Age: 75
End: 2020-07-20

## 2020-07-20 VITALS
BODY MASS INDEX: 29.86 KG/M2 | SYSTOLIC BLOOD PRESSURE: 130 MMHG | HEIGHT: 70 IN | WEIGHT: 208.6 LBS | DIASTOLIC BLOOD PRESSURE: 76 MMHG

## 2020-07-20 DIAGNOSIS — I48.0 PAF (PAROXYSMAL ATRIAL FIBRILLATION) (HCC): ICD-10-CM

## 2020-07-20 DIAGNOSIS — I10 ESSENTIAL HYPERTENSION: ICD-10-CM

## 2020-07-20 DIAGNOSIS — I25.10 CORONARY ARTERY DISEASE INVOLVING NATIVE CORONARY ARTERY OF NATIVE HEART WITHOUT ANGINA PECTORIS: Primary | ICD-10-CM

## 2020-07-20 PROCEDURE — 99214 OFFICE O/P EST MOD 30 MIN: CPT | Performed by: INTERNAL MEDICINE

## 2020-07-20 PROCEDURE — 93000 ELECTROCARDIOGRAM COMPLETE: CPT | Performed by: INTERNAL MEDICINE

## 2020-07-20 RX ORDER — DORZOLAMIDE HCL 20 MG/ML
SOLUTION/ DROPS OPHTHALMIC
COMMUNITY
Start: 2020-05-22

## 2020-07-20 RX ORDER — PREDNISOLONE ACETATE 10 MG/ML
SUSPENSION/ DROPS OPHTHALMIC
COMMUNITY
Start: 2020-06-26 | End: 2021-01-15

## 2020-07-25 ENCOUNTER — HOSPITAL ENCOUNTER (EMERGENCY)
Facility: HOSPITAL | Age: 75
Discharge: LEFT BEFORE DISCHARGE | End: 2020-07-25

## 2020-07-25 VITALS
OXYGEN SATURATION: 95 % | RESPIRATION RATE: 16 BRPM | DIASTOLIC BLOOD PRESSURE: 64 MMHG | HEART RATE: 60 BPM | SYSTOLIC BLOOD PRESSURE: 178 MMHG | TEMPERATURE: 98 F

## 2020-10-29 ENCOUNTER — TELEPHONE (OUTPATIENT)
Dept: CARDIOLOGY | Facility: CLINIC | Age: 75
End: 2020-10-29

## 2020-11-05 NOTE — TELEPHONE ENCOUNTER
Mrs. Archer called back and said pt was told he could take his metoprolol in the morning  before surgery tomorrow but he has a low HR as it is so she's wondering if it would be better to just skip it in the morning?      Gabbie # [phone]    Thanks,  Dunia

## 2020-11-05 NOTE — TELEPHONE ENCOUNTER
Spoke to Gabbie and she will have him take it and make sure anesthesia is aware of him having a low heart rate./prt

## 2020-11-27 ENCOUNTER — TELEPHONE (OUTPATIENT)
Dept: CARDIOLOGY | Facility: CLINIC | Age: 75
End: 2020-11-27

## 2020-11-27 NOTE — TELEPHONE ENCOUNTER
Pt wife called regarding patient having headaches, nausea, and elevated blood pressure the past few days.  He had shoulder surgery Monday and has been intermittently nauseated since the procedure. He resumed his Eliquis yesterday following the procedure    His home meds include   Valsartan 320 mg 1/2 tablet daily  Toprol XL 25 mg daily    Last night his B/P was 191/90 so he took an extra 1/2 tablet of valsartan prior to going to bed.  He also had a headache that was above his eyes in his forehead area that he took tylenol for and he was able to sleep.  He woke up this morning and his B/P was 177/127 and again had a headache. He took a full tablet of his valsartan, Toprol 25 mg , and amlodipine 5 mg (was an old medication that he has taken in the past but was stopped 1 year ago) but  His B/p remained elevated at 181/94 but his headache has resolved.  Discussed with Dr Bruno, due to his uncontrolled B/P and headaches she recommended he come to ED. Discussed with patient's wife.

## 2021-01-14 ENCOUNTER — TELEPHONE (OUTPATIENT)
Dept: CARDIOLOGY | Facility: CLINIC | Age: 76
End: 2021-01-14

## 2021-01-14 NOTE — TELEPHONE ENCOUNTER
Pt left msg saying he has been in Afib since yesterday morning. He took 2 Metoprolol yesterday morning, 1 last night and 2 this morning.  He says he is still in Afib.    Please advise.    Pt # [phone]    Thanks,  Dunia

## 2021-01-14 NOTE — TELEPHONE ENCOUNTER
He is supposed to see APRN tomorrow unless she does not have openings.  If no openings then schedule EKG visit.    Thanks,  Dunia

## 2021-01-14 NOTE — TELEPHONE ENCOUNTER
Do not have any openings tomorrow?  If not can he at least come in for an EKG tomorrow and I can assess that way.

## 2021-01-15 ENCOUNTER — OFFICE VISIT (OUTPATIENT)
Dept: CARDIOLOGY | Facility: CLINIC | Age: 76
End: 2021-01-15

## 2021-01-15 VITALS
SYSTOLIC BLOOD PRESSURE: 126 MMHG | HEART RATE: 47 BPM | DIASTOLIC BLOOD PRESSURE: 68 MMHG | OXYGEN SATURATION: 98 % | WEIGHT: 199 LBS | HEIGHT: 70 IN | BODY MASS INDEX: 28.49 KG/M2

## 2021-01-15 DIAGNOSIS — I10 ESSENTIAL HYPERTENSION: ICD-10-CM

## 2021-01-15 DIAGNOSIS — I25.10 CORONARY ARTERY DISEASE INVOLVING NATIVE CORONARY ARTERY OF NATIVE HEART WITHOUT ANGINA PECTORIS: ICD-10-CM

## 2021-01-15 DIAGNOSIS — I48.0 PAF (PAROXYSMAL ATRIAL FIBRILLATION) (HCC): Primary | ICD-10-CM

## 2021-01-15 PROCEDURE — 93000 ELECTROCARDIOGRAM COMPLETE: CPT | Performed by: NURSE PRACTITIONER

## 2021-01-15 PROCEDURE — 99214 OFFICE O/P EST MOD 30 MIN: CPT | Performed by: NURSE PRACTITIONER

## 2021-01-15 RX ORDER — ROPINIROLE 0.5 MG/1
0.5 TABLET, FILM COATED ORAL DAILY
Status: ON HOLD | COMMUNITY
Start: 2020-11-02 | End: 2022-04-19

## 2021-01-15 RX ORDER — METOPROLOL SUCCINATE 25 MG/1
TABLET, EXTENDED RELEASE ORAL
Qty: 90 TABLET | Refills: 11 | Status: SHIPPED | OUTPATIENT
Start: 2021-01-15 | End: 2021-01-15

## 2021-01-15 NOTE — PROGRESS NOTES
"    CARDIOLOGY    MAR[facility] Luis  Primary Cardiologist: Keo Montero MD      ENCOUNTER DATE:  01/15/2021    Carlo Archer / 75 y.o. / male        CHIEF COMPLAINT / REASON FOR OFFICE VISIT     Coronary Artery Disease (Pt notes being in a-fib for 2 days) and Atrial Fibrillation      HISTORY OF PRESENT ILLNESS       HPI  Carlo Archer is a 75 y.o. male who presents today for evaluation of possible atrial fibrillation episode.     Pt has a  history significant for PAF, CAD, hypertension.    Patient reports that he has done well since 1 year ago with atrial fibrillation until 2 days ago.  Patient states that 2 days ago, he felt like we went back into atrial fibrillation.  He gets fatigued and monitors his HR with his apple watch, which has noted that he is in a-fib.  He has noted some lightheadedness.  His HR has been anywhere from the 40s-140s.  Denies chest pain or shortness of breath.  He has been taking metoprolol succinate 50mg/day for the past 3 days, without change in symptoms.  He is compliant with Eliquis and has not missed doses.  He does not note any chest pain or shortness of breath.        The following portions of the patient's history were reviewed and updated as appropriate: allergies, current medications, past family history, past medical history, past social history, past surgical history and problem list.      VITAL SIGNS     Visit Vitals  /68 (BP Location: Left arm, Patient Position: Sitting, Cuff Size: Adult)   Pulse (!) 47   Ht 177.8 cm (70\")   Wt 90.3 kg (199 lb)   SpO2 98%   BMI 28.55 kg/m²         Wt Readings from Last 3 Encounters:   01/15/21 90.3 kg (199 lb)   07/20/20 94.6 kg (208 lb 9.6 oz)   01/14/20 95.7 kg (211 lb)     Body mass index is 28.55 kg/m².      REVIEW OF SYSTEMS   Review of Systems   Constitution: Positive for malaise/fatigue.   HENT: Positive for hearing loss.    Eyes: Positive for pain.   Cardiovascular: Negative for chest pain and leg swelling. "   Respiratory: Negative for shortness of breath.    Endocrine: Positive for cold intolerance and polyuria.   Skin: Positive for itching.   Genitourinary: Positive for decreased libido.   Neurological: Positive for light-headedness.   All other systems reviewed and are negative.          PHYSICAL EXAMINATION     Constitutional:       Appearance: Normal appearance. Well-developed.   Eyes:      Conjunctiva/sclera: Conjunctivae normal.   Neck:      Vascular: No carotid bruit.   Pulmonary:      Effort: Pulmonary effort is normal.      Breath sounds: Normal breath sounds.   Cardiovascular:      Tachycardia present. 130s Irregularly irregular rhythm. Normal S1. Normal S2.      Murmurs: There is no murmur.      No gallop. No click. No rub.   Musculoskeletal: Normal range of motion.      Comments: No pedal edema   Skin:     General: Skin is warm and dry.   Neurological:      Mental Status: Alert and oriented to person, place, and time.      GCS: GCS eye subscore is 4. GCS verbal subscore is 5. GCS motor subscore is 6.   Psychiatric:         Speech: Speech normal.         Behavior: Behavior normal.         Thought Content: Thought content normal.         Judgment: Judgment normal.           REVIEWED DATA       ECG 12 Lead    Date/Time: 1/15/2021 10:36 AM  Performed by: Yola Carmichael APRN  Authorized by: Yola Carmichael APRN   Comparison: compared with previous ECG from 7/20/2020  Rhythm: atrial fibrillation  Rate: tachycardic  BPM: 131  Conduction: conduction normal  ST Segments: ST segments normal  T Waves: T waves normal  QRS axis: normal  Other: no other findings    Clinical impression: abnormal EKG            Cardiac Procedures:  1. Echocardiogram 6/17/2016.  LVEF 57%.  Normal LV cavity size and wall thickness.  All LV wall segments contract normally.  No valvular stenosis or insufficiency.  2. Myocardial perfusion stress test 6/17/2016.  No evidence of ischemia.  LVEF 58%.  Impressions consistent with low risk  study.  3. Echocardiogram 1/10/2017.  LVEF 61%.  All LV wall segments contract normally.  LAE.  Normal valvular structures.          ASSESSMENT & PLAN      Diagnosis Plan   1. PAF (paroxysmal atrial fibrillation) (CMS/[facility])  ECG 12 Lead   2. Coronary artery disease involving native coronary artery of native heart without angina pectoris     3. Essential hypertension           SUMMARY/DISCUSSION  1. PAF.  Patient's atrial fibrillation has been very well controlled over the past year until 2 days ago when he noted that he was in atrial fibrillation with rapid rate both on his apple watch and he also had symptoms of fatigue and lightheadedness.  Patient has increased his metoprolol succinate dosing to 50 mg/day over the past 3 days with no improvement of his symptoms or his heart rate.  He is in atrial fibrillation with RVR with rate in the 130s on ECG imaging today.  I have recommended that we increase metoprolol succinate to 50 mg in the morning and 25 mg in the evening.  He was advised that if over the weekend he felt that his blood pressure was tolerating this increased dose and his heart rate was still elevated that he could increase to 50 mg twice daily.  If patient gets to 50 mg twice daily of metoprolol succinate and is still having problems with rate and rhythm control I think we need to consider adding either digoxin or amiodarone.  Patient is currently anticoagulated with apixaban 5 mg twice daily and has not missed any doses.  2. CAD.  Denies any angina or dyspnea.  Continue atorvastatin, metoprolol succinate, valsartan.  3. Hypertension.  Blood pressure currently controlled at 126/68.  Patient informed to monitor blood pressure with increased dose of beta-blocker.  Continue amlodipine 5 mg/day, valsartan 320 mg/day, metoprolol dosing as noted above.  4. Patient to call me on Monday and let me know if the increased dosage of metoprolol has improved his symptoms and episodes of atrial fibrillation.  Follow-up  to be arranged at that time.        MEDICATIONS         Discharge Medications          Accurate as of January 15, 2021 10:47 AM. If you have any questions, ask your nurse or doctor.            Changes to Medications      Instructions Start Date   amLODIPine 5 MG tablet  Commonly known as: NORVASC  What changed: additional instructions   5 mg, Oral, Daily         Continue These Medications      Instructions Start Date   apixaban 5 MG tablet tablet  Commonly known as: ELIQUIS   5 mg, Oral, Every 12 Hours Scheduled      atorvastatin 40 MG tablet  Commonly known as: LIPITOR   take 1 tablet by mouth at bedtime      BIOTIN PO   5,000 mg, Oral, Daily      cetirizine 10 MG tablet  Commonly known as: zyrTEC   10 mg, Oral, As Needed      Cialis 5 MG tablet  Generic drug: tadalafil   5 mg, Oral, Daily      dorzolamide 2 % ophthalmic solution  Commonly known as: TRUSOPT   INSTILL 1 DROP INTO BOTH EYES 2 TIMES A DAY      EpiPen 2-Zen 0.3 MG/0.3ML solution auto-injector injection  Generic drug: EPINEPHrine   EpiPen TRINY; Patient Sig: EpiPen TRINY ; 0; 06-Jan-2015; Active      Flomax 0.4 MG capsule 24 hr capsule  Generic drug: tamsulosin   1 capsule, Oral, 2 Times Daily      fluticasone 50 MCG/ACT nasal spray  Commonly known as: FLONASE   Nasal, As Needed      Lumigan 0.03 % ophthalmic drops  Generic drug: bimatoprost   Ophthalmic, Nightly      meloxicam 15 MG tablet  Commonly known as: MOBIC   No dose, route, or frequency recorded.      metoprolol succinate XL 25 MG 24 hr tablet  Commonly known as: TOPROL-XL   take 1 tablet by mouth once daily      nexIUM 40 MG capsule  Generic drug: esomeprazole   Oral, Daily      NIACIN PO   1,000 mg, Oral, Daily      rOPINIRole 0.5 MG tablet  Commonly known as: REQUIP   0.5 mg, Oral, Daily      valsartan 320 MG tablet  Commonly known as: DIOVAN   320 mg, Oral, Daily, Pt taking 1/2 daily      VITAMIN B-12 PO   Oral, Daily         Stop These Medications    prednisoLONE acetate 1 % ophthalmic  suspension  Commonly known as: JEANIE WEI  Stopped by: MARCI Luis                **Dragon Disclaimer:   Much of this encounter note is an electronic transcription/translation of spoken language to printed text. The electronic translation of spoken language may permit erroneous, or at times, nonsensical words or phrases to be inadvertently transcribed. Although I have reviewed the note for such errors, some may still exist.

## 2021-01-18 ENCOUNTER — TELEPHONE (OUTPATIENT)
Dept: CARDIOLOGY | Facility: CLINIC | Age: 76
End: 2021-01-18

## 2021-01-18 DIAGNOSIS — I48.0 PAF (PAROXYSMAL ATRIAL FIBRILLATION) (HCC): Primary | ICD-10-CM

## 2021-01-18 RX ORDER — METOPROLOL SUCCINATE 25 MG/1
TABLET, EXTENDED RELEASE ORAL
Qty: 270 TABLET | Refills: 3 | Status: ON HOLD | OUTPATIENT
Start: 2021-01-18 | End: 2022-04-19 | Stop reason: SDUPTHER

## 2021-01-18 NOTE — TELEPHONE ENCOUNTER
Gabbie left msg asking for you to call Carlo.  He is still in AFib.  He is taking 2 Metoprolol in the morning and 1 at bedtime-nothing is helping.  He saw the APRN last week-01/15/21 and wants you to take a look at that visit and get your opinion on it.    Carlo # [phone]    Thanks,  Dunia

## 2021-03-17 ENCOUNTER — OFFICE VISIT (OUTPATIENT)
Dept: CARDIOLOGY | Facility: CLINIC | Age: 76
End: 2021-03-17

## 2021-03-17 VITALS
HEIGHT: 70 IN | SYSTOLIC BLOOD PRESSURE: 150 MMHG | DIASTOLIC BLOOD PRESSURE: 90 MMHG | WEIGHT: 205 LBS | HEART RATE: 45 BPM | BODY MASS INDEX: 29.35 KG/M2

## 2021-03-17 DIAGNOSIS — I48.0 PAF (PAROXYSMAL ATRIAL FIBRILLATION) (HCC): Primary | ICD-10-CM

## 2021-03-17 DIAGNOSIS — I25.10 CORONARY ARTERY DISEASE INVOLVING NATIVE CORONARY ARTERY OF NATIVE HEART WITHOUT ANGINA PECTORIS: ICD-10-CM

## 2021-03-17 PROBLEM — I48.91 NEW ONSET ATRIAL FIBRILLATION: Status: RESOLVED | Noted: 2017-01-10 | Resolved: 2021-03-17

## 2021-03-17 PROCEDURE — 93000 ELECTROCARDIOGRAM COMPLETE: CPT | Performed by: INTERNAL MEDICINE

## 2021-03-17 PROCEDURE — 99205 OFFICE O/P NEW HI 60 MIN: CPT | Performed by: INTERNAL MEDICINE

## 2021-03-17 RX ORDER — ERYTHROMYCIN 500 MG/1
TABLET, COATED ORAL
COMMUNITY
Start: 2021-01-11

## 2021-03-17 NOTE — PROGRESS NOTES
Date of Office Visit: 2021  Encounter Provider: Yves Samson MD  Place of Service: [facility] CARDIOLOGY  Patient Name: Carlo Archer  : 1945    Subjective:     Encounter Date:2021      Patient ID: Carlo Archer is a 76 y.o. male who has a cc of PAF and is referred by ; stent in /     He has had AF since 2017. The last time, 11-12 days before he came out. Jose a year ago he had it for 11 days.     He races cars. Last week he had a racing heart beat up to 150 bpm. This required him to take extra metoprolol.     In between episodes he feels good.     The patient had a good year.   No anginal chest pain,   He gets stafford with even up to walking a quarter mile.   No soa,   No fainting,  No orthostasis.   No edema.   Exercise tolerance: he is sedentary     There have been no hospital admission since the last visit.     There have been no bleeding events.       Past Medical History:   Diagnosis Date   • CAD (coronary artery disease)    • Hypertension    • PAF (paroxysmal atrial fibrillation) (CMS/[facility])        Social History     Socioeconomic History   • Marital status:      Spouse name: Not on file   • Number of children: Not on file   • Years of education: Not on file   • Highest education level: Not on file   Tobacco Use   • Smoking status: Former Smoker   • Smokeless tobacco: Never Used   • Tobacco comment: caffeine use   Vaping Use   • Vaping Use: Never used   Substance and Sexual Activity   • Alcohol use: Not Currently     Comment: occasional/ caffeine use 1 cup daily   • Drug use: No   • Sexual activity: Yes       Review of Systems   Constitutional: Negative for fever and night sweats.   HENT: Negative for ear pain and stridor.    Eyes: Negative for discharge and visual halos.   Cardiovascular: Negative for cyanosis.   Respiratory: Negative for hemoptysis and sputum production.    Hematologic/Lymphatic: Negative for adenopathy.   Skin: Negative for nail changes and  "unusual hair distribution.   Musculoskeletal: Negative for gout and joint swelling.   Gastrointestinal: Negative for bowel incontinence and flatus.   Genitourinary: Negative for dysuria and flank pain.   Neurological: Negative for seizures and tremors.   Psychiatric/Behavioral: Negative for altered mental status. The patient is not nervous/anxious.             Objective:     Vitals:    03/17/21 1109   BP: 150/90   Pulse: (!) 45   Weight: 93 kg (205 lb)   Height: 177.8 cm (70\")         Eyes:      General:         Right eye: No discharge.         Left eye: No discharge.   HENT:      Head: Normocephalic and atraumatic.   Neck:      Thyroid: No thyromegaly.      Vascular: No JVD.   Pulmonary:      Effort: Pulmonary effort is normal.      Breath sounds: Normal breath sounds. No rales.   Cardiovascular:      Normal rate. Regular rhythm.      No gallop.   Edema:     Peripheral edema absent.   Abdominal:      General: Bowel sounds are normal.      Palpations: Abdomen is soft.      Tenderness: There is no abdominal tenderness.   Musculoskeletal: Normal range of motion.         General: No deformity. Skin:     General: Skin is warm and dry.      Findings: No erythema.   Neurological:      Mental Status: Alert and oriented to person, place, and time.      Motor: Normal muscle tone.   Psychiatric:         Behavior: Behavior normal.         Thought Content: Thought content normal.           ECG 12 Lead    Date/Time: 3/17/2021 11:34 AM  Performed by: Yves Samson MD  Authorized by: Yves Samson MD   Comparison: compared with previous ECG   Similar to previous ECG  Rhythm: sinus rhythm  Rate: normal  Conduction: conduction normal  ST Segments: ST segments normal  T Waves: T waves normal  QRS axis: normal    Clinical impression: normal ECG            Lab Review:       Assessment:          Diagnosis Plan   1. PAF (paroxysmal atrial fibrillation) (CMS/[facility])     2. Coronary artery disease involving native coronary artery of " native heart without angina pectoris            Plan:       AF --    We disc risk factor modification, his AF will get better.     He has low burden AF and I would not use AAD or ablation unless it gets worse -- more frequent.     I will refer to sleep clinic.     I am optimistic about his AF.

## 2021-04-19 ENCOUNTER — OFFICE VISIT (OUTPATIENT)
Dept: SLEEP MEDICINE | Facility: HOSPITAL | Age: 76
End: 2021-04-19

## 2021-04-19 VITALS
OXYGEN SATURATION: 97 % | HEIGHT: 70 IN | SYSTOLIC BLOOD PRESSURE: 182 MMHG | DIASTOLIC BLOOD PRESSURE: 96 MMHG | WEIGHT: 209 LBS | HEART RATE: 50 BPM | BODY MASS INDEX: 29.92 KG/M2

## 2021-04-19 DIAGNOSIS — I25.10 CORONARY ARTERY DISEASE INVOLVING NATIVE CORONARY ARTERY OF NATIVE HEART WITHOUT ANGINA PECTORIS: ICD-10-CM

## 2021-04-19 DIAGNOSIS — I48.0 PAF (PAROXYSMAL ATRIAL FIBRILLATION) (HCC): ICD-10-CM

## 2021-04-19 DIAGNOSIS — G47.33 OBSTRUCTIVE SLEEP APNEA, ADULT: Primary | ICD-10-CM

## 2021-04-19 PROCEDURE — G0463 HOSPITAL OUTPT CLINIC VISIT: HCPCS

## 2021-04-19 NOTE — PROGRESS NOTES
[facility]      Patient Care Team:  Enrique Moe MD as PCP - General (Family Medicine)    Referring Provider: Yves Samson MD    Chief complaint:   Referred for sleep apnea evaluation.    History of present illness:  Subjective   This is a 76 year old male patient with hx of A fib.  He follows with Dr. Samson.  He is referred here for evaluation for sleep apnea.    His A. fib was diagnosed with A. fib in 2017.  It has reoccurred several times and it has lasted for about 11 days on 2 occasions.    He reported loud snoring which sometimes disturb his wife but denies awakening himself snoring or gasping.  He does however wake up multiple times at night almost every 2 hours, to urinate.  He has BPH and takes Flomax.  He also reported leg jerks and waking up with a dry mouth.    Sleep schedule:  -Bedtime: 10 PM  -Sleep latency: 15-30 min  -Wake up time: 7 AM , does feel refreshed  -Nocturnal awakenin-3 times because of nocturia.  No difficulties going back to sleep.      ESS: Total score: 0     Has gained 10 pounds over the last 5 years.  He does not exercise regularly and does not have a special diet.    Review of Systems  Constitutional: No fever or chills. No changes in appetite.   ENMT: No sinus congestion, postnasal drip, hoarsness  Cardiovascular: No chest pain, palpitation or legs swelling.    Respiratory: Shortness of breath and cough.  Gastrointestinal: No constipation, diarrhea, abdominal pain or acid reflux  Neurology: No headache, weakness, numbness or dizziness.   Musculoskeletal: No joints pain, stiffness or swelling.   Psychiatry: No depression, anxiety or irritability.   Hem/Lymphatic: No swollen glands or easy bruising.  Integumentary: No rash.  Endocrinology: No excessive thirst, cold or warm intolerance.   Urinary: No dysuria, bloody urine but frequent urination.     History  Past Medical History:   Diagnosis Date   • CAD (coronary  artery disease)    • Hypertension    • PAF (paroxysmal atrial fibrillation) (CMS/HCC)    ,   Past Surgical History:   Procedure Laterality Date   • CHOLECYSTECTOMY     • CORONARY ANGIOPLASTY     • JOINT REPLACEMENT Right     hip   • TONSILLECTOMY     • TOTAL SHOULDER REPLACEMENT     • VASECTOMY     ,   Family History   Problem Relation Age of Onset   • Heart attack Father    • Hypertension Sister    • Heart disease Sister    • Sudden death Brother    ,   Social History     Socioeconomic History   • Marital status:      Spouse name: Not on file   • Number of children: Not on file   • Years of education: Not on file   • Highest education level: Not on file   Tobacco Use   • Smoking status: Former Smoker   • Smokeless tobacco: Never Used   • Tobacco comment: caffeine use   Vaping Use   • Vaping Use: Never used   Substance and Sexual Activity   • Alcohol use: Not Currently     Comment: occasional/ caffeine use 1 cup daily   • Drug use: No   • Sexual activity: Yes     E-cigarette/Vaping   • E-cigarette/Vaping Use Never User         and Allergies:  Bee venom, Clindamycin phosphate, Penicillin v, Penicillins, Streptomycin, and Levaquin [levofloxacin]    Medications:    Current Outpatient Medications:   •  amLODIPine (NORVASC) 5 MG tablet, Take 1 tablet by mouth Daily. (Patient taking differently: Take 5 mg by mouth Daily. taking 1/2), Disp: 30 tablet, Rfl: 11  •  apixaban (ELIQUIS) 5 MG tablet tablet, Take 1 tablet by mouth Every 12 (Twelve) Hours., Disp: 60 tablet, Rfl: 11  •  atorvastatin (LIPITOR) 40 MG tablet, take 1 tablet by mouth at bedtime, Disp: 30 tablet, Rfl: 5  •  bimatoprost (LUMIGAN) 0.03 % ophthalmic drops, Apply  to eye Every Night., Disp: , Rfl:   •  BIOTIN PO, Take 5,000 mg by mouth Daily., Disp: , Rfl:   •  cetirizine (zyrTEC) 10 MG tablet, Take 10 mg by mouth As Needed., Disp: , Rfl:   •  Cyanocobalamin (VITAMIN B-12 PO), Take  by mouth Daily., Disp: , Rfl:   •  dorzolamide (TRUSOPT) 2 %  "ophthalmic solution, INSTILL 1 DROP INTO BOTH EYES 2 TIMES A DAY, Disp: , Rfl:   •  EPINEPHrine (EPIPEN 2-MARY) 0.3 MG/0.3ML solution auto-injector injection, EpiPen TRINY; Patient Sig: EpiPen TRINY ; 0; 06-Jan-2015; Active, Disp: , Rfl:   •  erythromycin base (E-MYCIN) 500 MG tablet, , Disp: , Rfl:   •  esomeprazole (NEXIUM) 40 MG capsule, Take  by mouth Daily., Disp: , Rfl:   •  fluticasone (FLONASE) 50 MCG/ACT nasal spray, into each nostril As Needed., Disp: , Rfl:   •  meloxicam (MOBIC) 15 MG tablet, , Disp: , Rfl: 0  •  metoprolol succinate XL (TOPROL-XL) 25 MG 24 hr tablet, TAKE 2 TABLETS BY MOUTH EVERY MORNING AND 1 TABLET EVERY EVENING (Patient taking differently: Daily), Disp: 270 tablet, Rfl: 3  •  NIACIN PO, Take 1,000 mg by mouth Daily., Disp: , Rfl:   •  rOPINIRole (REQUIP) 0.5 MG tablet, Take 0.5 mg by mouth Daily., Disp: , Rfl:   •  tadalafil (CIALIS) 5 MG tablet, Take 5 mg by mouth Daily., Disp: , Rfl:   •  tamsulosin (FLOMAX) 0.4 MG capsule 24 hr capsule, Take 1 capsule by mouth 2 (Two) Times a Day., Disp: , Rfl:   •  valsartan (DIOVAN) 320 MG tablet, Take 320 mg by mouth Daily. Pt taking 1/2 daily, Disp: , Rfl:       Objective   Vital Signs:  Vitals:    04/19/21 0900   BP: (!) 182/96   Pulse: 50   SpO2: 97%   Weight: 94.8 kg (209 lb)   Height: 177.8 cm (70\")     Body mass index is 29.99 kg/m².  Neck Circumference: 16.75 inches     Physical Exam:  Neck Circumference: 16.75 inches     Constitutional: Not in acute distress.  Eyes: Injected conjunctiva, EOMI. pupils equal reactive to light.  ENMT: Garcia score 4. Mallampati score 3. No oral thrush. Tonsils grade 1. Narrow distance in between the posterior pharyngeal pillars (<25 %).   Neck: Large. No thyromegaly.  Trachea midline.  Heart: Regular rhythm and rate, no murmur  Lungs: Good and equal air entry bilaterally. No crackles or wheezing.  Nonlabored breathing.       Abdomen: Obese.  Soft.  No tenderness.  Positive bowel sounds.  Extremities: No " cyanosis, clubbing or pitting edema.  Warm extremities and well-perfused.  Neuro: Conscious, alert, oriented x3.  Gait is normal.  Strength 5/5 in arms and legs.  Psych: Appropriate mood and affect.    Integumentary: No rash.  Normal skin turgor.  Lymphatic: No palpable cervical or supraclavicular lymph nodes.    Diagnostic data:  Echo 1/10/17: LA moderately dilated.    Assessment   1. Snoring, probable DIONNE: Not much symptomatic but his upper airway anatomy is very suggestive.  2. Paroxysmal A. fib  3. Obesity (BMI 30)  4. Nocturia    Plan:  Check PSG. We discussed the pathophysiology of sleep apnea, testing and therapy which include CPAP and weight loss.  Patient is agreeable with CPAP therapy if needed.    Counseled for weight loss.  Encouraged to exercise regularly and cut down on carbohydrates.  Discussed that losing weight may decrease the severity of sleep apnea and obviate the need of CPAP therapy.    Discussed the association between obstructive sleep apnea and cardiovascular disease including A. fib and HTN and the beneficial effect of sleep apnea therapy and reducing the risk of cardiovascular events and reducing the recurrence of her arrhythmia.        Heather Russ MD  04/19/21  10:58 EDT    This note was dictated utilizing Dragon dictation

## 2021-04-20 ENCOUNTER — TRANSCRIBE ORDERS (OUTPATIENT)
Dept: SLEEP MEDICINE | Facility: HOSPITAL | Age: 76
End: 2021-04-20

## 2021-04-20 DIAGNOSIS — Z01.818 OTHER SPECIFIED PRE-OPERATIVE EXAMINATION: Primary | ICD-10-CM

## 2021-04-22 DIAGNOSIS — G47.33 OSA (OBSTRUCTIVE SLEEP APNEA): Primary | ICD-10-CM

## 2021-04-22 PROBLEM — G47.30 SLEEP APNEA: Status: ACTIVE | Noted: 2021-04-22

## 2021-05-12 ENCOUNTER — APPOINTMENT (OUTPATIENT)
Dept: LAB | Facility: HOSPITAL | Age: 76
End: 2021-05-12

## 2021-05-17 ENCOUNTER — HOSPITAL ENCOUNTER (OUTPATIENT)
Dept: SLEEP MEDICINE | Facility: HOSPITAL | Age: 76
Discharge: HOME | End: 2021-05-17
Admitting: INTERNAL MEDICINE

## 2021-05-17 DIAGNOSIS — G47.33 OSA (OBSTRUCTIVE SLEEP APNEA): ICD-10-CM

## 2021-05-17 PROCEDURE — 95806 SLEEP STUDY UNATT&RESP EFFT: CPT

## 2021-05-26 ENCOUNTER — OFFICE VISIT (OUTPATIENT)
Dept: ORTHOPEDIC SURGERY | Facility: CLINIC | Age: 76
End: 2021-05-26

## 2021-05-26 VITALS — BODY MASS INDEX: 29.2 KG/M2 | HEIGHT: 70 IN | WEIGHT: 204 LBS | TEMPERATURE: 97.8 F

## 2021-05-26 DIAGNOSIS — Z96.641 STATUS POST TOTAL REPLACEMENT OF RIGHT HIP: ICD-10-CM

## 2021-05-26 DIAGNOSIS — M25.552 HIP PAIN, BILATERAL: Primary | ICD-10-CM

## 2021-05-26 DIAGNOSIS — M25.551 HIP PAIN, BILATERAL: Primary | ICD-10-CM

## 2021-05-26 DIAGNOSIS — M51.36 DDD (DEGENERATIVE DISC DISEASE), LUMBAR: ICD-10-CM

## 2021-05-26 DIAGNOSIS — G89.29 OTHER CHRONIC PAIN: ICD-10-CM

## 2021-05-26 PROCEDURE — 72100 X-RAY EXAM L-S SPINE 2/3 VWS: CPT | Performed by: ORTHOPAEDIC SURGERY

## 2021-05-26 PROCEDURE — 73521 X-RAY EXAM HIPS BI 2 VIEWS: CPT | Performed by: ORTHOPAEDIC SURGERY

## 2021-05-26 PROCEDURE — 99214 OFFICE O/P EST MOD 30 MIN: CPT | Performed by: ORTHOPAEDIC SURGERY

## 2021-05-26 NOTE — PROGRESS NOTES
Patient Name: Carlo Archer   YOB: 1945  Referring Primary Care Physician: Enrique Moe MD  BMI: Body mass index is 29.27 kg/m².    Chief Complaint:    Chief Complaint   Patient presents with   • Left Hip - Follow-up, Pain   • Right Hip - Follow-up, Pain        HPI:     Carlo Archer is a 76 y.o. male who presents today for evaluation of   Chief Complaint   Patient presents with   • Left Hip - Follow-up, Pain   • Right Hip - Follow-up, Pain   . The patient is here for a follow up on his bilateral hips. He had a right total hip arthroplasty in 2007 by Dr. No. He was not prescribed any physical therapy. The patient notes that his pain is localized to his lower back. He denies radiating pain or groin pain. The patient notes that his pain is worse in the morning when he wakes up.       Subjective   Medications:   Home Medications:  Current Outpatient Medications on File Prior to Visit   Medication Sig   • amLODIPine (NORVASC) 5 MG tablet Take 1 tablet by mouth Daily. (Patient taking differently: Take 5 mg by mouth Daily. taking 1/2)   • apixaban (ELIQUIS) 5 MG tablet tablet Take 1 tablet by mouth Every 12 (Twelve) Hours.   • atorvastatin (LIPITOR) 40 MG tablet take 1 tablet by mouth at bedtime   • bimatoprost (LUMIGAN) 0.03 % ophthalmic drops Apply  to eye Every Night.   • BIOTIN PO Take 5,000 mg by mouth Daily.   • cetirizine (zyrTEC) 10 MG tablet Take 10 mg by mouth As Needed.   • dorzolamide (TRUSOPT) 2 % ophthalmic solution INSTILL 1 DROP INTO BOTH EYES 2 TIMES A DAY   • EPINEPHrine (EPIPEN 2-MARY) 0.3 MG/0.3ML solution auto-injector injection EpiPen TRINY; Patient Sig: EpiPen TRINY ; 0; 06-Jan-2015; Active   • meloxicam (MOBIC) 15 MG tablet    • metoprolol succinate XL (TOPROL-XL) 25 MG 24 hr tablet TAKE 2 TABLETS BY MOUTH EVERY MORNING AND 1 TABLET EVERY EVENING (Patient taking differently: Daily)   • NIACIN PO Take 1,000 mg by mouth Daily.   • tadalafil (CIALIS) 5 MG tablet Take 5 mg  by mouth Daily.   • tamsulosin (FLOMAX) 0.4 MG capsule 24 hr capsule Take 1 capsule by mouth 2 (Two) Times a Day.   • valsartan (DIOVAN) 320 MG tablet Take 320 mg by mouth Daily. Pt taking 1/2 daily   • Cyanocobalamin (VITAMIN B-12 PO) Take  by mouth Daily.   • erythromycin base (E-MYCIN) 500 MG tablet    • esomeprazole (NEXIUM) 40 MG capsule Take  by mouth Daily.   • fluticasone (FLONASE) 50 MCG/ACT nasal spray into each nostril As Needed.   • rOPINIRole (REQUIP) 0.5 MG tablet Take 0.5 mg by mouth Daily.     No current facility-administered medications on file prior to visit.     Current Medications:  Scheduled Meds:  Continuous Infusions:No current facility-administered medications for this visit.    PRN Meds:.    I have reviewed the patient's medical history in detail and updated the computerized patient record.  Review and summarization of old records includes:    Past Medical History:   Diagnosis Date   • CAD (coronary artery disease)    • Hypertension    • PAF (paroxysmal atrial fibrillation) (CMS/[facility])         Past Surgical History:   Procedure Laterality Date   • CHOLECYSTECTOMY     • CORONARY ANGIOPLASTY     • JOINT REPLACEMENT Right     hip   • TONSILLECTOMY     • TOTAL SHOULDER REPLACEMENT     • VASECTOMY          Social History     Occupational History   • Not on file   Tobacco Use   • Smoking status: Former Smoker   • Smokeless tobacco: Never Used   • Tobacco comment: caffeine use   Vaping Use   • Vaping Use: Never used   Substance and Sexual Activity   • Alcohol use: Not Currently     Comment: occasional/ caffeine use 1 cup daily   • Drug use: No   • Sexual activity: Yes      Social History     Social History Narrative   • Not on file        Family History   Problem Relation Age of Onset   • Heart attack Father    • Hypertension Sister    • Heart disease Sister    • Sudden death Brother        ROS: 14 point review of systems was performed and all other systems were reviewed and are negative except for  "documented findings in HPI and today's encounter.     Allergies:   Allergies   Allergen Reactions   • Bee Venom Anaphylaxis   • Clindamycin Phosphate Anaphylaxis     Allergy to streptamycin    • Penicillin V Anaphylaxis   • Penicillins Anaphylaxis   • Streptomycin Anaphylaxis   • Levaquin [Levofloxacin] Myalgia     tendonitis     Constitutional:  Denies fever, shaking or chills   Eyes:  Denies change in visual acuity   HENT:  Denies nasal congestion or sore throat   Respiratory:  Denies cough or shortness of breath   Cardiovascular:  Denies chest pain or severe LE edema   GI:  Denies abdominal pain, nausea, vomiting, bloody stools or diarrhea   Musculoskeletal:  Numbness, tingling, pain, or loss of motor function only as noted above in history of present illness.  : Denies painful urination or hematuria  Integument:  Denies rash, lesion or ulceration   Neurologic:  Denies headache or focal weakness  Endocrine:  Denies lymphadenopathy  Psych:  Denies confusion or change in mental status   Hem:  Denies active bleeding    OBJECTIVE:  Physical Exam: 76 y.o. male  Wt Readings from Last 3 Encounters:   05/26/21 92.5 kg (204 lb)   04/19/21 94.8 kg (209 lb)   03/17/21 93 kg (205 lb)     Ht Readings from Last 1 Encounters:   05/26/21 177.8 cm (70\")     Body mass index is 29.27 kg/m².  Vitals:    05/26/21 0934   Temp: 97.8 °F (36.6 °C)     Vital signs reviewed.     General Appearance:    Alert, cooperative, in no acute distress                  Eyes: conjunctiva clear  ENT: external ears and nose atraumatic  CV: no peripheral edema  Resp: normal respiratory effort  Skin: no rashes or wounds; normal turgor  Psych: mood and affect appropriate  Lymph: no nodes appreciated  Neuro: gross sensation intact  Vascular:  Palpable peripheral pulse in noted extremity  Musculoskeletal Extremities: Examination today shows he is tender bilaterally over the lower back and sacroiliac joints. Stinchfield test is negative. Logroll and axial " loading of his hips is negative. He is not tender over the trochanters and his pain does not radiate.     Radiology:   wo views of the bilateral hips, including AP and lateral views, were obtained and reviewed in the office today for pain. With comparison views from 08/05/2019, these demonstrated no obvious changes from that time. In the visualized portion of the lower lumbosacral spine, he has severe degenerative disc disease.    Two views of the lumbar spine including AP and lateral views, were obtained and reviewed in the office today for pain. Without comparison views, these demonstrated degenerative scoliosis, degenerative disc disease and some sacroiliac joint changes as well.        Assessment:     ICD-10-CM ICD-9-CM   1. Hip pain, bilateral  M25.551 719.45    M25.552    2. Other chronic pain  G89.29 338.29   3. DDD (degenerative disc disease), lumbar  M51.36 722.52   4. Status post total replacement of right hip  Z96.641 V43.64        MDM/Plan:   The diagnosis(es), natural history, pathophysiology and treatment for diagnosis(es) were discussed. Opportunity given and questions answered.  Biomechanics of pertinent body areas discussed.  When appropriate, the use of ambulatory aids discussed.    We discussed the nature of the patient's back pain. The patient has severe degenerative disc disease. I have recommended physical therapy. We also discussed the benefits of ice/heat therapy along with liniments and Tylenol. The patient agreed to physical therapy. If he does not see improvements, I will refer him to a spine specialist.     EXERCISES:  Advice on benefits of, and types of regular/moderate exercise pertaining to orthopedic diagnosis(es).  Inflammation/pain control; with cold, heat, elevation and/or liniments discussed as appropriate  PT referral.  MEDICAL RECORDS reviewed from other provider(s) for past and current medical history pertinent to this complaint.  We discussed his ASR hip but he has no current  complaints and has been in since 2007    Scribed for Arthur Hawkins MD by Lily Lee.  05/26/21   11:13 EDT    I have personally performed the services described in this document as scribed by the above individual, and it is both accurate and complete.  Arthur Hawkins MD  5/27/2021  10:39 EDT

## 2021-06-03 ENCOUNTER — TELEPHONE (OUTPATIENT)
Dept: SLEEP MEDICINE | Facility: HOSPITAL | Age: 76
End: 2021-06-03

## 2021-06-11 ENCOUNTER — PATIENT MESSAGE (OUTPATIENT)
Dept: ORTHOPEDIC SURGERY | Facility: CLINIC | Age: 76
End: 2021-06-11

## 2021-06-11 RX ORDER — CYCLOBENZAPRINE HCL 5 MG
5 TABLET ORAL 3 TIMES DAILY PRN
Qty: 30 TABLET | Refills: 0 | Status: SHIPPED | OUTPATIENT
Start: 2021-06-11 | End: 2021-11-17

## 2021-06-11 NOTE — TELEPHONE ENCOUNTER
From: Carlo Archer  To: Arthur Hawkins MD  Sent: 6/11/2021 10:53 AM EDT  Subject: Prescription Question    could give me a prescription tor muscle relaxers

## 2021-06-14 ENCOUNTER — OFFICE VISIT (OUTPATIENT)
Dept: SLEEP MEDICINE | Facility: HOSPITAL | Age: 76
End: 2021-06-14

## 2021-06-14 VITALS
OXYGEN SATURATION: 96 % | HEIGHT: 68 IN | BODY MASS INDEX: 32.13 KG/M2 | SYSTOLIC BLOOD PRESSURE: 176 MMHG | HEART RATE: 79 BPM | WEIGHT: 212 LBS | DIASTOLIC BLOOD PRESSURE: 99 MMHG

## 2021-06-14 DIAGNOSIS — G47.33 OBSTRUCTIVE SLEEP APNEA, ADULT: Primary | ICD-10-CM

## 2021-06-14 PROCEDURE — G0463 HOSPITAL OUTPT CLINIC VISIT: HCPCS

## 2021-06-14 NOTE — PROGRESS NOTES
[facility]                          Chief Complaint:   Follow up for obstructive sleep apnea    History of present illness:   Subjective      Summary:  Patient is a 76 y.o. male patient with hx of paroxysmal A fib who complains of loud snoring and nocturia.     HST 5/18/2021 showed JIMY 7.7/H and mahendra SpO2 83%.    Patient is here today to follow on the results of the sleep test.  He stated that couple of nights ago, he woke up at night with heart racing and he had an episode of A. fib.        ESS: Total score: 0       Past Medical History:  Past Medical History:   Diagnosis Date   • CAD (coronary artery disease)    • Hypertension    • PAF (paroxysmal atrial fibrillation) (CMS/[facility])    ,   Past Surgical History:   Procedure Laterality Date   • CHOLECYSTECTOMY     • CORONARY ANGIOPLASTY     • JOINT REPLACEMENT Right     hip   • TONSILLECTOMY     • TOTAL SHOULDER REPLACEMENT     • VASECTOMY      and Allergies:  Bee venom, Clindamycin phosphate, Penicillin v, Penicillins, Streptomycin, and Levaquin [levofloxacin]    Medication Review:     Current Outpatient Medications:   •  amLODIPine (NORVASC) 5 MG tablet, Take 1 tablet by mouth Daily. (Patient taking differently: Take 5 mg by mouth Daily. taking 1/2), Disp: 30 tablet, Rfl: 11  •  apixaban (ELIQUIS) 5 MG tablet tablet, Take 1 tablet by mouth Every 12 (Twelve) Hours., Disp: 60 tablet, Rfl: 11  •  atorvastatin (LIPITOR) 40 MG tablet, take 1 tablet by mouth at bedtime, Disp: 30 tablet, Rfl: 5  •  bimatoprost (LUMIGAN) 0.03 % ophthalmic drops, Apply  to eye Every Night., Disp: , Rfl:   •  BIOTIN PO, Take 5,000 mg by mouth Daily., Disp: , Rfl:   •  cetirizine (zyrTEC) 10 MG tablet, Take 10 mg by mouth As Needed., Disp: , Rfl:   •  Cyanocobalamin (VITAMIN B-12 PO), Take  by mouth Daily., Disp: , Rfl:   •  cyclobenzaprine (FLEXERIL) 5 MG tablet, Take 1 tablet by mouth 3 (Three) Times a Day As Needed for Muscle  "Spasms., Disp: 30 tablet, Rfl: 0  •  dorzolamide (TRUSOPT) 2 % ophthalmic solution, INSTILL 1 DROP INTO BOTH EYES 2 TIMES A DAY, Disp: , Rfl:   •  EPINEPHrine (EPIPEN 2-MARY) 0.3 MG/0.3ML solution auto-injector injection, EpiPen TRINY; Patient Sig: EpiPen TRINY ; 0; 06-Jan-2015; Active, Disp: , Rfl:   •  erythromycin base (E-MYCIN) 500 MG tablet, , Disp: , Rfl:   •  esomeprazole (NEXIUM) 40 MG capsule, Take  by mouth Daily., Disp: , Rfl:   •  fluticasone (FLONASE) 50 MCG/ACT nasal spray, into each nostril As Needed., Disp: , Rfl:   •  meloxicam (MOBIC) 15 MG tablet, , Disp: , Rfl: 0  •  metoprolol succinate XL (TOPROL-XL) 25 MG 24 hr tablet, TAKE 2 TABLETS BY MOUTH EVERY MORNING AND 1 TABLET EVERY EVENING (Patient taking differently: Daily), Disp: 270 tablet, Rfl: 3  •  NIACIN PO, Take 1,000 mg by mouth Daily., Disp: , Rfl:   •  rOPINIRole (REQUIP) 0.5 MG tablet, Take 0.5 mg by mouth Daily., Disp: , Rfl:   •  tadalafil (CIALIS) 5 MG tablet, Take 5 mg by mouth Daily., Disp: , Rfl:   •  tamsulosin (FLOMAX) 0.4 MG capsule 24 hr capsule, Take 1 capsule by mouth 2 (Two) Times a Day., Disp: , Rfl:   •  valsartan (DIOVAN) 320 MG tablet, Take 320 mg by mouth Daily. Pt taking 1/2 daily, Disp: , Rfl:       Objective   Vital Signs:  Vitals:    06/14/21 0900   BP: 176/99   Pulse: 79   SpO2: 96%   Weight: 96.2 kg (212 lb)   Height: 172.7 cm (68\")     Body mass index is 32.23 kg/m².          Physical Exam:   General Appearance:    Alert, cooperative, in no acute distress       Neck:   Trachea midline.   Lungs:     Clear to auscultation,respirations regular, even and                  unlabored    Heart:    Regular rhythm and normal rate, normal S1 and S2, no            Murmur.       Neuro:   Conscious, alert, oriented x3. Appropriate mood and affect.    Extremities:   Moves all extremities well, no edema, no cyanosis, no             Redness              Assessment   1. DIONNE  2. Paroxysmal A. fib        PLAN:  Discussed the result of " the sleep study and treatment with CPAP which is mostly directed toward A. fib now since patient is not significantly symptomatic..  Will initiate auto CPAP therapy.  Patient is agreeable with treatment.                This note was dictated utilizing [employer] dictation

## 2021-07-18 ENCOUNTER — TELEPHONE (OUTPATIENT)
Dept: CARDIOLOGY | Facility: CLINIC | Age: 76
End: 2021-07-18

## 2021-07-18 NOTE — TELEPHONE ENCOUNTER
Patient called because he accidentally took an extra dose of eliquis this morning. Discussed with Dr. Bruno and advised that he continue his regular schedule and to come to the ER if he were to develop any bleeding. He verbalized understanding.

## 2021-08-23 ENCOUNTER — APPOINTMENT (OUTPATIENT)
Dept: SLEEP MEDICINE | Facility: HOSPITAL | Age: 76
End: 2021-08-23

## 2021-09-22 ENCOUNTER — OFFICE VISIT (OUTPATIENT)
Dept: ORTHOPEDIC SURGERY | Facility: CLINIC | Age: 76
End: 2021-09-22

## 2021-09-22 VITALS — WEIGHT: 212 LBS | HEIGHT: 68 IN | BODY MASS INDEX: 32.13 KG/M2 | TEMPERATURE: 97.5 F

## 2021-09-22 DIAGNOSIS — S70.01XA CONTUSION OF RIGHT HIP AND THIGH, INITIAL ENCOUNTER: ICD-10-CM

## 2021-09-22 DIAGNOSIS — Z96.641 HISTORY OF HIP REPLACEMENT, TOTAL, RIGHT: Primary | ICD-10-CM

## 2021-09-22 DIAGNOSIS — S70.11XA CONTUSION OF RIGHT HIP AND THIGH, INITIAL ENCOUNTER: ICD-10-CM

## 2021-09-22 PROCEDURE — 99213 OFFICE O/P EST LOW 20 MIN: CPT | Performed by: ORTHOPAEDIC SURGERY

## 2021-09-22 PROCEDURE — 73502 X-RAY EXAM HIP UNI 2-3 VIEWS: CPT | Performed by: ORTHOPAEDIC SURGERY

## 2021-09-22 RX ORDER — BRIMONIDINE TARTRATE/TIMOLOL 0.2%-0.5%
1 DROPS OPHTHALMIC (EYE) EVERY 12 HOURS
COMMUNITY
Start: 2021-08-09

## 2021-09-22 NOTE — PROGRESS NOTES
"Patient Name: Carlo Archer   YOB: 1945  Referring Primary Care Physician: Enrique Moe MD  BMI: Body mass index is 32.23 kg/m².    Chief Complaint:    Chief Complaint   Patient presents with   • right hip pain        HPI:     Carlo Archer is a 76 y.o. male who presents today for evaluation of   Chief Complaint   Patient presents with   • right hip pain   .  Patient is seen today complaint of a 3-week history of right hip pain he was climbing up on his motor home try to step on the tongue fell off and fell about 2 and half feet landed on the side of his right hip he had had an ASR hip placed about 2007 by Dr. Stovall and wanted to get it checked.  He said he could hardly walk at first he has been using a cane but he is \"nearly 100%\" better.  He owns and runs [employer]      Subjective   Medications:   Home Medications:  Current Outpatient Medications on File Prior to Visit   Medication Sig   • apixaban (ELIQUIS) 5 MG tablet tablet Take 1 tablet by mouth Every 12 (Twelve) Hours.   • atorvastatin (LIPITOR) 40 MG tablet take 1 tablet by mouth at bedtime   • bimatoprost (LUMIGAN) 0.03 % ophthalmic drops Apply  to eye Every Night.   • BIOTIN PO Take 5,000 mg by mouth Daily.   • brimonidine-timolol (Combigan) 0.2-0.5 % ophthalmic solution    • cetirizine (zyrTEC) 10 MG tablet Take 10 mg by mouth As Needed.   • Cyanocobalamin (VITAMIN B-12 PO) Take  by mouth Daily.   • cyclobenzaprine (FLEXERIL) 5 MG tablet Take 1 tablet by mouth 3 (Three) Times a Day As Needed for Muscle Spasms.   • dorzolamide (TRUSOPT) 2 % ophthalmic solution INSTILL 1 DROP INTO BOTH EYES 2 TIMES A DAY   • EPINEPHrine (EPIPEN 2-MARY) 0.3 MG/0.3ML solution auto-injector injection EpiPen TRINY; Patient Sig: Amairani AGOSTO ; 0; 06-Jan-2015; Active   • erythromycin base (E-MYCIN) 500 MG tablet    • esomeprazole (NEXIUM) 40 MG capsule Take  by mouth Daily.   • fluticasone (FLONASE) 50 MCG/ACT nasal spray into each nostril As Needed. "   • meloxicam (MOBIC) 15 MG tablet    • metoprolol succinate XL (TOPROL-XL) 25 MG 24 hr tablet TAKE 2 TABLETS BY MOUTH EVERY MORNING AND 1 TABLET EVERY EVENING (Patient taking differently: Daily)   • NIACIN PO Take 1,000 mg by mouth Daily.   • tadalafil (CIALIS) 5 MG tablet Take 5 mg by mouth Daily.   • tamsulosin (FLOMAX) 0.4 MG capsule 24 hr capsule Take 1 capsule by mouth 2 (Two) Times a Day.   • valsartan (DIOVAN) 320 MG tablet Take 320 mg by mouth Daily. Pt taking 1/2 daily   • rOPINIRole (REQUIP) 0.5 MG tablet Take 0.5 mg by mouth Daily.   • [DISCONTINUED] amLODIPine (NORVASC) 5 MG tablet Take 1 tablet by mouth Daily. (Patient taking differently: Take 5 mg by mouth Daily. taking 1/2)     No current facility-administered medications on file prior to visit.     Current Medications:  Scheduled Meds:  Continuous Infusions:No current facility-administered medications for this visit.    PRN Meds:.    I have reviewed the patient's medical history in detail and updated the computerized patient record.  Review and summarization of old records includes:    Past Medical History:   Diagnosis Date   • CAD (coronary artery disease)    • Hypertension    • PAF (paroxysmal atrial fibrillation) (CMS/[facility])         Past Surgical History:   Procedure Laterality Date   • CHOLECYSTECTOMY     • CORONARY ANGIOPLASTY     • HIP SURGERY      Replaced right hip   • JOINT REPLACEMENT Right     hip   • SHOULDER SURGERY  2020    Reverse shoulder   • TONSILLECTOMY     • TOTAL SHOULDER REPLACEMENT     • VASECTOMY          Social History     Occupational History   • Not on file   Tobacco Use   • Smoking status: Former Smoker     Packs/day: 1.50     Years: 34.00     Pack years: 51.00     Types: Cigarettes     Start date: 1960     Quit date: 1994     Years since quittin.0   • Smokeless tobacco: Never Used   • Tobacco comment: Quit in    Vaping Use   • Vaping Use: Never used   Substance and Sexual Activity   • Alcohol use:  "Not Currently     Alcohol/week: 0.0 standard drinks     Comment: occasional/ caffeine use 1 cup daily   • Drug use: No   • Sexual activity: Yes     Partners: Female      Social History     Social History Narrative   • Not on file        Family History   Problem Relation Age of Onset   • Heart attack Father    • Hypertension Sister    • Heart disease Sister    • Sudden death Brother        ROS: 14 point review of systems was performed and all other systems were reviewed and are negative except for documented findings in HPI and today's encounter.     Allergies:   Allergies   Allergen Reactions   • Bee Venom Anaphylaxis   • Clindamycin Phosphate Anaphylaxis     Allergy to streptamycin    • Penicillin V Anaphylaxis   • Penicillins Anaphylaxis   • Streptomycin Anaphylaxis   • Levaquin [Levofloxacin] Myalgia     tendonitis     Constitutional:  Denies fever, shaking or chills   Eyes:  Denies change in visual acuity   HENT:  Denies nasal congestion or sore throat   Respiratory:  Denies cough or shortness of breath   Cardiovascular:  Denies chest pain or severe LE edema   GI:  Denies abdominal pain, nausea, vomiting, bloody stools or diarrhea   Musculoskeletal:  Numbness, tingling, pain, or loss of motor function only as noted above in history of present illness.  : Denies painful urination or hematuria  Integument:  Denies rash, lesion or ulceration   Neurologic:  Denies headache or focal weakness  Endocrine:  Denies lymphadenopathy  Psych:  Denies confusion or change in mental status   Hem:  Denies active bleeding    OBJECTIVE:  Physical Exam: 76 y.o. male  Wt Readings from Last 3 Encounters:   09/22/21 96.2 kg (212 lb)   06/14/21 96.2 kg (212 lb)   05/26/21 92.5 kg (204 lb)     Ht Readings from Last 1 Encounters:   09/22/21 172.7 cm (68\")     Body mass index is 32.23 kg/m².  Vitals:    09/22/21 0909   Temp: 97.5 °F (36.4 °C)     Vital signs reviewed.     General Appearance:    Alert, cooperative, in no acute distress   "                Eyes: conjunctiva clear  ENT: external ears and nose atraumatic  CV: no peripheral edema  Resp: normal respiratory effort  Skin: no rashes or wounds; normal turgor  Psych: mood and affect appropriate  Lymph: no nodes appreciated  Neuro: gross sensation intact  Vascular:  Palpable peripheral pulse in noted extremity  Musculoskeletal Extremities: Examination today pleasant gentleman he got mild tenderness over his right greater trochanter can flex and extend his hip internal/external rotation is nontender axial loading is nontender he is using a cane which he has been using but says it is doing a lot better    Radiology:   AP of the hips lateral right hip taken the office today for complaints of fall and pain with comparison views show ASR hip that appears to be in good condition.  He does have a small calcification inferior to the hip but you can see it on the old x-rays to appears to be positional        Assessment:     ICD-10-CM ICD-9-CM   1. History of hip replacement, total, right  Z96.641 V43.64   2. Contusion of right hip and thigh, initial encounter  S70.01XA 924.00    S70.11XA 924.01        MDM/Plan:   The diagnosis(es), natural history, pathophysiology and treatment for diagnosis(es) were discussed. Opportunity given and questions answered.  Biomechanics of pertinent body areas discussed.  When appropriate, the use of ambulatory aids discussed.    BMI:  The concept of BMI body mass index and its importance and implications discussed.    Inflammation/pain control; with cold, heat, elevation and/or liniments discussed as appropriate  MEDICAL RECORDS reviewed from other provider(s) for past and current medical history pertinent to this complaint.  Suspicious of an occult pelvic fracture but with him nearly 100% better 3 weeks out with recommend continuing with a cane and if he has a sudden increase in his pain will get a specialized imaging but my hope would be continue to  improve    9/22/2021    Much of this encounter note is an electronic transcription/translation of spoken language to printed text. The electronic translation of spoken language may permit erroneous, or at times, nonsensical words or phrases to be inadvertently transcribed; Although I have reviewed the note for such errors, some may still exist    Answers for HPI/ROS submitted by the patient on 9/22/2021  What is the primary reason for your visit?: Other  Please describe your symptoms.: Hip pain , I fell 3 weeks ago  Have you had these symptoms before?: No  How long have you been having these symptoms?: Greater than 2 weeks  Please list any medications you are currently taking for this condition.: Tylenoyl

## 2021-10-11 ENCOUNTER — ON CAMPUS - OUTPATIENT (AMBULATORY)
Dept: URBAN - METROPOLITAN AREA HOSPITAL 77 | Facility: HOSPITAL | Age: 76
End: 2021-10-11
Payer: COMMERCIAL

## 2021-10-11 DIAGNOSIS — K29.50 UNSPECIFIED CHRONIC GASTRITIS WITHOUT BLEEDING: ICD-10-CM

## 2021-10-11 DIAGNOSIS — K57.30 DIVERTICULOSIS OF LARGE INTESTINE WITHOUT PERFORATION OR ABS: ICD-10-CM

## 2021-10-11 DIAGNOSIS — R10.13 EPIGASTRIC PAIN: ICD-10-CM

## 2021-10-11 DIAGNOSIS — D12.3 BENIGN NEOPLASM OF TRANSVERSE COLON: ICD-10-CM

## 2021-10-11 DIAGNOSIS — K44.9 DIAPHRAGMATIC HERNIA WITHOUT OBSTRUCTION OR GANGRENE: ICD-10-CM

## 2021-10-11 DIAGNOSIS — Z86.010 PERSONAL HISTORY OF COLONIC POLYPS: ICD-10-CM

## 2021-10-11 PROCEDURE — 45385 COLONOSCOPY W/LESION REMOVAL: CPT | Mod: 33 | Performed by: INTERNAL MEDICINE

## 2021-10-11 PROCEDURE — 43239 EGD BIOPSY SINGLE/MULTIPLE: CPT | Performed by: INTERNAL MEDICINE

## 2021-11-17 ENCOUNTER — OFFICE VISIT (OUTPATIENT)
Dept: CARDIOLOGY | Facility: CLINIC | Age: 76
End: 2021-11-17

## 2021-11-17 VITALS
WEIGHT: 212 LBS | HEIGHT: 70 IN | DIASTOLIC BLOOD PRESSURE: 84 MMHG | SYSTOLIC BLOOD PRESSURE: 136 MMHG | HEART RATE: 44 BPM | BODY MASS INDEX: 30.35 KG/M2

## 2021-11-17 DIAGNOSIS — G47.33 OSA (OBSTRUCTIVE SLEEP APNEA): ICD-10-CM

## 2021-11-17 DIAGNOSIS — E66.2 CLASS 1 OBESITY WITH ALVEOLAR HYPOVENTILATION, SERIOUS COMORBIDITY, AND BODY MASS INDEX (BMI) OF 30.0 TO 30.9 IN ADULT (HCC): ICD-10-CM

## 2021-11-17 DIAGNOSIS — I48.0 PAF (PAROXYSMAL ATRIAL FIBRILLATION) (HCC): Primary | ICD-10-CM

## 2021-11-17 DIAGNOSIS — I10 ESSENTIAL HYPERTENSION: ICD-10-CM

## 2021-11-17 DIAGNOSIS — I25.10 CORONARY ARTERY DISEASE INVOLVING NATIVE CORONARY ARTERY OF NATIVE HEART WITHOUT ANGINA PECTORIS: ICD-10-CM

## 2021-11-17 DIAGNOSIS — R06.09 DOE (DYSPNEA ON EXERTION): ICD-10-CM

## 2021-11-17 PROCEDURE — 99214 OFFICE O/P EST MOD 30 MIN: CPT | Performed by: NURSE PRACTITIONER

## 2021-11-17 PROCEDURE — 93000 ELECTROCARDIOGRAM COMPLETE: CPT | Performed by: NURSE PRACTITIONER

## 2021-11-17 NOTE — PROGRESS NOTES
"Date of Office Visit: 2021  Encounter Provider: MARCI Lott  Place of Service: [facility] CARDIOLOGY  Patient Name: Carlo Archer  :1945    Chief Complaint   Patient presents with   • Atrial Fibrillation     6 month f/u   • Coronary Artery Disease   :     HPI: Carlo Archer is a 76 y.o. male who followed by Dr. Montero, he has CAD, status post stent in , he has PAF and was referred to Dr. Samson in March.    He recommended monitoring at that time as he had a low burden.  He also referred him to sleep for evaluation.    He presents today for follow-up.    He has had one episode of PAF since March---he took an extra metoprolol and it resolved. He is unaware of any particular trigger.    He denies chest pain by complains of some shortness of breath with exertion---not new and he says because he is \"fat.\" He has gained about 15 lbs with the pandemic and is fairly sedentary.     He denies PND, orthopnea or edema.     He is on apixaban for AC, no bleeding issues.     He also complains of being tired---he had sleep study--per Dr. Russ's noted in ---it was positive and he was going to get him set up with CPAP---he has never received the machine and has not followed up.      Past Medical History:   Diagnosis Date   • CAD (coronary artery disease)    • Hypertension    • PAF (paroxysmal atrial fibrillation) (HCC)        Past Surgical History:   Procedure Laterality Date   • CHOLECYSTECTOMY     • CORONARY ANGIOPLASTY     • HIP SURGERY      Replaced right hip   • JOINT REPLACEMENT Right     hip   • SHOULDER SURGERY  2020    Reverse shoulder   • TONSILLECTOMY     • TOTAL SHOULDER REPLACEMENT     • VASECTOMY         Social History     Socioeconomic History   • Marital status:    Tobacco Use   • Smoking status: Former Smoker     Packs/day: 1.50     Years: 34.00     Pack years: 51.00     Types: Cigarettes     Start date: 1960     Quit date: 1994    "  Years since quittin.2   • Smokeless tobacco: Never Used   • Tobacco comment: Quit in    Vaping Use   • Vaping Use: Never used   Substance and Sexual Activity   • Alcohol use: Not Currently     Alcohol/week: 0.0 standard drinks     Comment: occasional/ caffeine use 1 cup daily   • Drug use: No   • Sexual activity: Yes     Partners: Female       Family History   Problem Relation Age of Onset   • Heart attack Father    • Hypertension Sister    • Heart disease Sister    • Sudden death Brother        Review of Systems   Constitutional: Positive for malaise/fatigue. Negative for chills and fever.   Cardiovascular: Positive for dyspnea on exertion. Negative for chest pain, leg swelling, near-syncope, orthopnea, palpitations, paroxysmal nocturnal dyspnea and syncope.   Respiratory: Negative for cough and shortness of breath.    Hematologic/Lymphatic: Negative.    Musculoskeletal: Negative for joint pain, joint swelling and myalgias.   Gastrointestinal: Negative for abdominal pain, diarrhea, melena, nausea and vomiting.   Genitourinary: Negative for frequency and hematuria.   Neurological: Negative for light-headedness, numbness, paresthesias and seizures.   Allergic/Immunologic: Negative.    All other systems reviewed and are negative.      Allergies   Allergen Reactions   • Bee Venom Anaphylaxis   • Clindamycin Phosphate Anaphylaxis     Allergy to streptamycin    • Penicillin V Anaphylaxis   • Penicillins Anaphylaxis   • Streptomycin Anaphylaxis   • Levaquin [Levofloxacin] Myalgia     tendonitis         Current Outpatient Medications:   •  apixaban (ELIQUIS) 5 MG tablet tablet, Take 1 tablet by mouth Every 12 (Twelve) Hours., Disp: 180 tablet, Rfl: 3  •  atorvastatin (LIPITOR) 40 MG tablet, take 1 tablet by mouth at bedtime, Disp: 30 tablet, Rfl: 5  •  brimonidine-timolol (Combigan) 0.2-0.5 % ophthalmic solution, , Disp: , Rfl:   •  cetirizine (zyrTEC) 10 MG tablet, Take 10 mg by mouth As Needed., Disp: , Rfl:  "  •  Cyanocobalamin (VITAMIN B-12 PO), Take  by mouth Daily., Disp: , Rfl:   •  dorzolamide (TRUSOPT) 2 % ophthalmic solution, INSTILL 1 DROP INTO BOTH EYES 2 TIMES A DAY, Disp: , Rfl:   •  EPINEPHrine (EPIPEN 2-MARY) 0.3 MG/0.3ML solution auto-injector injection, EpiPen TRINY; Patient Sig: EpiPen TRINY ; 0; 06-Jan-2015; Active, Disp: , Rfl:   •  erythromycin base (E-MYCIN) 500 MG tablet, , Disp: , Rfl:   •  esomeprazole (NEXIUM) 40 MG capsule, Take  by mouth Daily., Disp: , Rfl:   •  fluticasone (FLONASE) 50 MCG/ACT nasal spray, into each nostril As Needed., Disp: , Rfl:   •  meloxicam (MOBIC) 15 MG tablet, , Disp: , Rfl: 0  •  metoprolol succinate XL (TOPROL-XL) 25 MG 24 hr tablet, TAKE 2 TABLETS BY MOUTH EVERY MORNING AND 1 TABLET EVERY EVENING (Patient taking differently: Daily), Disp: 270 tablet, Rfl: 3  •  tadalafil (CIALIS) 5 MG tablet, Take 5 mg by mouth Daily., Disp: , Rfl:   •  tamsulosin (FLOMAX) 0.4 MG capsule 24 hr capsule, Take 1 capsule by mouth 2 (Two) Times a Day., Disp: , Rfl:   •  valsartan (DIOVAN) 320 MG tablet, Take 320 mg by mouth Daily. Pt taking 1/2 daily, Disp: , Rfl:   •  rOPINIRole (REQUIP) 0.5 MG tablet, Take 0.5 mg by mouth Daily., Disp: , Rfl:       Objective:     Vitals:    11/17/21 0947   BP: 136/84   Pulse: (!) 44   Weight: 96.2 kg (212 lb)   Height: 177.8 cm (70\")     Body mass index is 30.42 kg/m².    PHYSICAL EXAM:    Vitals Reviewed.   General Appearance: No acute distress, well developed and well nourished.   Eyes: Conjunctiva and lids: No erythema, swelling, or discharge. Sclera non-icteric.   HENT: Atraumatic, normocephalic. External eyes, ears, and nose normal.   Respiratory: No signs of respiratory distress. Respiration rhythm and depth normal.   Clear to auscultation. No rales, crackles, rhonchi, or wheezing auscultated.   Cardiovascular:  Jugular Venous Pressure: Normal  Heart Rate and Rhythm: Normal, Heart Sounds: Normal S1 and S2. No S3 or S4 noted.  Murmurs: No murmurs " noted. No rubs, thrills, or gallops.   Arterial Pulses:  Posterior tibialis and dorsalis pedis pulses normal.   Lower Extremities: No edema noted.  Gastrointestinal:  Abdomen soft, non-distended, non-tender.   Musculoskeletal: Normal movement of extremities  Skin and Nails: General appearance normal. No pallor, cyanosis, diaphoresis. Skin temperature normal. No clubbing of fingernails.   Psychiatric: Patient alert and oriented to person, place, and time. Speech and behavior appropriate. Normal mood and affect.       ECG 12 Lead    Date/Time: 11/17/2021 9:44 AM  Performed by: Shoshana Freedman APRN  Authorized by: Shoshana Freedman APRN   Comparison: compared with previous ECG   Similar to previous ECG  Rhythm: sinus bradycardia  Ectopy: atrial premature contractions  BPM: 44                Assessment:       Diagnosis Plan   1. PAF (paroxysmal atrial fibrillation) ([facility])  ECG 12 Lead   2. Essential hypertension     3. Coronary artery disease involving native coronary artery of native heart without angina pectoris     4. DIONNE (obstructive sleep apnea)     5. Class 1 obesity with alveolar hypoventilation, serious comorbidity, and body mass index (BMI) of 30.0 to 30.9 in adult ([facility])     6. BROWN (dyspnea on exertion)            Plan:       1.  PAF---low burden, 1 episode in the last 6 months, resolved with extra dose of metoprolol---apixaban for AC. Discussed importance of risk factor modification.     2.  Hypertension, controlled.     3.  CAD, PCI 2007--no angina, follows with Dr. Montero for primary cardiology.    4. DIONNE---sleep study was positive, he complains of fatigue---recommend that he call and follow up with Dr. Russ and find out why he has not gotten his CPAP machine.     5. For the problem of overweight/obesity, we discussed the importance of lifestyle measures and strategies for weight loss, such as improved nutrition, regular exercise and sleep hygiene.     6. BROWN---not really new and he thinks due to weight  gain---primarily with bending over and walking inclines---he is fairly sedentary---encouraged to work on weight loss and regular exercise/activity and see if improved---follow up with Dr. Montero in January.     Follow up with Dr. Montero in January and follow up with Dr. Samson in 1 year.     As always, it has been a pleasure to participate in your patient's care.      Sincerely,         MARCI Artis

## 2022-01-24 ENCOUNTER — OFFICE VISIT (OUTPATIENT)
Dept: CARDIOLOGY | Facility: CLINIC | Age: 77
End: 2022-01-24

## 2022-01-24 VITALS
SYSTOLIC BLOOD PRESSURE: 140 MMHG | HEIGHT: 70 IN | HEART RATE: 70 BPM | BODY MASS INDEX: 30.92 KG/M2 | DIASTOLIC BLOOD PRESSURE: 80 MMHG | OXYGEN SATURATION: 97 % | WEIGHT: 216 LBS

## 2022-01-24 DIAGNOSIS — I25.10 CORONARY ARTERY DISEASE INVOLVING NATIVE CORONARY ARTERY OF NATIVE HEART WITHOUT ANGINA PECTORIS: Primary | ICD-10-CM

## 2022-01-24 DIAGNOSIS — I48.0 PAF (PAROXYSMAL ATRIAL FIBRILLATION): ICD-10-CM

## 2022-01-24 DIAGNOSIS — I10 ESSENTIAL HYPERTENSION: ICD-10-CM

## 2022-01-24 PROCEDURE — 99214 OFFICE O/P EST MOD 30 MIN: CPT | Performed by: INTERNAL MEDICINE

## 2022-01-25 NOTE — PROGRESS NOTES
"      CARDIOLOGY    Keo Montero MD    ENCOUNTER DATE:  01/24/2022    Carlo Archer / 76 y.o. / male        CHIEF COMPLAINT / REASON FOR OFFICE VISIT     PAF (paroxysmal atrial fibrillation) (11/17/2021 Follow up)  Hypertension  Coronary artery disease    HISTORY OF PRESENT ILLNESS       HPI  Carlo Archer is a 76 y.o. male who presents today for reevaluation.  Overall he is actually doing fairly well.  He did have an episode of atrial fibrillation New Year's Day.  He took an extra beta-blocker he rested and ultimately he converted.  Does say he feels like he is slowing down little bit.  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  He has had no chest discomfort no shortness of breath recent palpitations lightheadedness or swelling.      The following portions of the patient's history were reviewed and updated as appropriate: allergies, current medications, past family history, past medical history, past social history, past surgical history and problem list.      VITAL SIGNS     Visit Vitals  /80 (BP Location: Left arm)   Pulse 70   Ht 177.8 cm (70\")   Wt 98 kg (216 lb)   SpO2 97%   BMI 30.99 kg/m²         Wt Readings from Last 3 Encounters:   01/24/22 98 kg (216 lb)   11/17/21 96.2 kg (212 lb)   09/22/21 96.2 kg (212 lb)     Body mass index is 30.99 kg/m².      REVIEW OF SYSTEMS   Review of Systems   All other systems reviewed and are negative.          PHYSICAL EXAMINATION     Vitals reviewed.   Constitutional:       Appearance: Healthy appearance.   Pulmonary:      Breath sounds: Normal breath sounds.   Cardiovascular:      Normal rate. Regular rhythm. Normal S1. Normal S2.      Murmurs: There is no murmur.      No gallop. No click. No rub.   Pulses:     Intact distal pulses.   Edema:     Peripheral edema absent.   Neurological:      Mental Status: Alert and oriented to person, place and time.           REVIEWED DATA     Procedures    Cardiac Procedures:  1.           ASSESSMENT & PLAN      Diagnosis " Plan   1. Coronary artery disease involving native coronary artery of native heart without angina pectoris     2. PAF (paroxysmal atrial fibrillation) ([facility])     3. Essential hypertension           SUMMARY/DISCUSSION  1. Coronary artery disease.  He remains stable.  2. Paroxysmal atrial fibrillation still goes in and out he knows when he is in it he is doing all the right things to get himself back out.  3. Hypertension blood pressures okay.  4. At most patients he is ready for the pandemic to and says he misses going out and socializing as much as he used to.  Patient told to follow-up in 6 months sooner course if he cannot get himself out of atrial fibrillation.        MEDICATIONS         Discharge Medications          Accurate as of January 24, 2022 11:59 PM. If you have any questions, ask your nurse or doctor.            Changes to Medications      Instructions Start Date   metoprolol succinate XL 25 MG 24 hr tablet  Commonly known as: TOPROL-XL  What changed: See the new instructions.   TAKE 2 TABLETS BY MOUTH EVERY MORNING AND 1 TABLET EVERY EVENING         Continue These Medications      Instructions Start Date   apixaban 5 MG tablet tablet  Commonly known as: ELIQUIS   5 mg, Oral, Every 12 Hours Scheduled      atorvastatin 40 MG tablet  Commonly known as: LIPITOR   take 1 tablet by mouth at bedtime      cetirizine 10 MG tablet  Commonly known as: zyrTEC   10 mg, Oral, As Needed      Cialis 5 MG tablet  Generic drug: tadalafil   5 mg, Oral, Daily      Combigan 0.2-0.5 % ophthalmic solution  Generic drug: brimonidine-timolol   No dose, route, or frequency recorded.      dorzolamide 2 % ophthalmic solution  Commonly known as: TRUSOPT   INSTILL 1 DROP INTO BOTH EYES 2 TIMES A DAY      EpiPen 2-Zen 0.3 MG/0.3ML solution auto-injector injection  Generic drug: EPINEPHrine   EpiPen TRINY; Patient Sig: EpiPen TRINY ; 0; 06-Jan-2015; Active      erythromycin base 500 MG tablet  Commonly known as: E-MYCIN   No dose,  route, or frequency recorded.      Flomax 0.4 MG capsule 24 hr capsule  Generic drug: tamsulosin   1 capsule, Oral, 2 Times Daily      fluticasone 50 MCG/ACT nasal spray  Commonly known as: FLONASE   Nasal, As Needed      meloxicam 15 MG tablet  Commonly known as: MOBIC   No dose, route, or frequency recorded.      nexIUM 40 MG capsule  Generic drug: esomeprazole   Oral, Daily      rOPINIRole 0.5 MG tablet  Commonly known as: REQUIP   0.5 mg, Oral, Daily      valsartan 320 MG tablet  Commonly known as: DIOVAN   320 mg, Oral, Daily, Pt taking 1/2 daily      VITAMIN B-12 PO   Oral, Daily                 **Dragon Disclaimer:   Much of this encounter note is an electronic transcription/translation of spoken language to printed text. The electronic translation of spoken language may permit erroneous, or at times, nonsensical words or phrases to be inadvertently transcribed. Although I have reviewed the note for such errors, some may still exist.

## 2022-02-11 ENCOUNTER — TELEPHONE (OUTPATIENT)
Dept: CARDIOLOGY | Facility: CLINIC | Age: 77
End: 2022-02-11

## 2022-02-11 NOTE — TELEPHONE ENCOUNTER
Pt sayd he has been in afib for 3 days now.  He has taken 25 mg metoprolol today and 50 yesterday.  He took 100 mgs 3 days ago but nothing has helped.   He would like to know what to do.    [phone]    Thanks,  Dunia

## 2022-02-11 NOTE — TELEPHONE ENCOUNTER
Patient needs to see Dr. Samson.  He has appointment in November we need to move that up in the next several weeks if possible.

## 2022-03-23 ENCOUNTER — OFFICE VISIT (OUTPATIENT)
Dept: CARDIOLOGY | Facility: CLINIC | Age: 77
End: 2022-03-23

## 2022-03-23 VITALS
HEIGHT: 70 IN | BODY MASS INDEX: 30.21 KG/M2 | DIASTOLIC BLOOD PRESSURE: 72 MMHG | WEIGHT: 211 LBS | SYSTOLIC BLOOD PRESSURE: 100 MMHG | HEART RATE: 144 BPM

## 2022-03-23 DIAGNOSIS — I10 ESSENTIAL HYPERTENSION: ICD-10-CM

## 2022-03-23 DIAGNOSIS — R06.09 DOE (DYSPNEA ON EXERTION): ICD-10-CM

## 2022-03-23 DIAGNOSIS — I48.0 PAF (PAROXYSMAL ATRIAL FIBRILLATION): Primary | ICD-10-CM

## 2022-03-23 PROCEDURE — 99214 OFFICE O/P EST MOD 30 MIN: CPT | Performed by: INTERNAL MEDICINE

## 2022-03-23 PROCEDURE — 93000 ELECTROCARDIOGRAM COMPLETE: CPT | Performed by: INTERNAL MEDICINE

## 2022-03-23 RX ORDER — DICYCLOMINE HYDROCHLORIDE 10 MG/1
10 CAPSULE ORAL 2 TIMES DAILY
COMMUNITY
Start: 2022-03-16 | End: 2022-05-17

## 2022-03-23 RX ORDER — ROPINIROLE 0.5 MG/1
0.5 TABLET, FILM COATED ORAL NIGHTLY
COMMUNITY

## 2022-03-23 NOTE — PROGRESS NOTES
Date of Office Visit: 2022  Encounter Provider: Yves Samson MD  Place of Service: [facility] CARDIOLOGY  Patient Name: Carlo Archer  : 1945    Subjective:     Encounter Date:2022      Patient ID: Carlo Archer is a 77 y.o. male who has a cc of PAF and he has CAD s/p stent and he has had a marked increase in freq and duration of AF episodes occurring now about monthly lasting days     He has a high rate and feel soa and fatigue and palp.     When not in AF he has fatigue.     No anginal chest pain,   No sig stafford,   No soa,   No fainting,  No orthostasis.   No edema.   Exercise tolerance: decent.     There have been no hospital admission since the last visit.     There have been no bleeding events.       Past Medical History:   Diagnosis Date   • CAD (coronary artery disease)    • Hypertension    • PAF (paroxysmal atrial fibrillation) ([facility])        Social History     Socioeconomic History   • Marital status:    Tobacco Use   • Smoking status: Former Smoker     Packs/day: 1.50     Years: 34.00     Pack years: 51.00     Types: Cigarettes     Start date: 1960     Quit date: 1994     Years since quittin.5   • Smokeless tobacco: Never Used   • Tobacco comment: Quit in    Vaping Use   • Vaping Use: Never used   Substance and Sexual Activity   • Alcohol use: Not Currently     Alcohol/week: 0.0 standard drinks     Comment: occasional/ caffeine use 1 cup daily   • Drug use: No   • Sexual activity: Yes     Partners: Female       Family History   Problem Relation Age of Onset   • Heart attack Father    • Hypertension Sister    • Heart disease Sister    • Sudden death Brother        Review of Systems   Constitutional: Negative for fever and night sweats.   HENT: Negative for ear pain and stridor.    Eyes: Negative for discharge and visual halos.   Cardiovascular: Negative for cyanosis.   Respiratory: Negative for hemoptysis and sputum production.   "  Hematologic/Lymphatic: Negative for adenopathy.   Skin: Negative for nail changes and unusual hair distribution.   Musculoskeletal: Negative for gout and joint swelling.   Gastrointestinal: Negative for bowel incontinence and flatus.   Genitourinary: Negative for dysuria and flank pain.   Neurological: Negative for seizures and tremors.   Psychiatric/Behavioral: Negative for altered mental status. The patient is not nervous/anxious.             Objective:     Vitals:    03/23/22 0851   BP: 100/72   Pulse: (!) 144   Weight: 95.7 kg (211 lb)   Height: 177.8 cm (70\")         Eyes:      General:         Right eye: No discharge.         Left eye: No discharge.   HENT:      Head: Normocephalic and atraumatic.   Neck:      Thyroid: No thyromegaly.      Vascular: No JVD.   Pulmonary:      Effort: Pulmonary effort is normal.      Breath sounds: Normal breath sounds. No rales.   Cardiovascular:      Tachycardia present. Irregularly irregular rhythm.      No gallop.   Edema:     Peripheral edema absent.   Abdominal:      General: Bowel sounds are normal.      Palpations: Abdomen is soft.      Tenderness: There is no abdominal tenderness.   Musculoskeletal: Normal range of motion.         General: No deformity. Skin:     General: Skin is warm and dry.      Findings: No erythema.   Neurological:      Mental Status: Alert and oriented to person, place, and time.      Motor: Normal muscle tone.   Psychiatric:         Behavior: Behavior normal.         Thought Content: Thought content normal.           ECG 12 Lead    Date/Time: 3/23/2022 9:27 AM  Performed by: Yves Samson MD  Authorized by: Yves Samson MD   Comparison: compared with previous ECG   Similar to previous ECG  Rhythm: atrial fibrillation  Other findings: non-specific ST-T wave changes            Lab Review:       Assessment:          Diagnosis Plan   1. PAF (paroxysmal atrial fibrillation) (HCC)     2. BROWN (dyspnea on exertion)     3. Essential hypertension   "          Plan:     He is clearly worse. I think we need to do rhythm control.     Dronedarone or amio vs ablation. (ICs out b/c of janel and CAD)     After a long disc including all risks we decided on ablation.

## 2022-03-28 ENCOUNTER — TRANSCRIBE ORDERS (OUTPATIENT)
Dept: CARDIOLOGY | Facility: CLINIC | Age: 77
End: 2022-03-28

## 2022-03-28 DIAGNOSIS — Z13.6 SCREENING FOR CARDIOVASCULAR CONDITION: ICD-10-CM

## 2022-03-28 DIAGNOSIS — Z01.810 PREPROCEDURAL CARDIOVASCULAR EXAMINATION: Primary | ICD-10-CM

## 2022-03-28 DIAGNOSIS — Z01.818 OTHER SPECIFIED PRE-OPERATIVE EXAMINATION: ICD-10-CM

## 2022-04-16 ENCOUNTER — LAB (OUTPATIENT)
Dept: LAB | Facility: HOSPITAL | Age: 77
End: 2022-04-16

## 2022-04-16 DIAGNOSIS — Z01.818 OTHER SPECIFIED PRE-OPERATIVE EXAMINATION: ICD-10-CM

## 2022-04-16 DIAGNOSIS — Z01.810 PREPROCEDURAL CARDIOVASCULAR EXAMINATION: ICD-10-CM

## 2022-04-16 DIAGNOSIS — Z13.6 SCREENING FOR CARDIOVASCULAR CONDITION: ICD-10-CM

## 2022-04-16 DIAGNOSIS — I10 ESSENTIAL HYPERTENSION: Primary | ICD-10-CM

## 2022-04-16 LAB — SARS-COV-2 ORF1AB RESP QL NAA+PROBE: NOT DETECTED

## 2022-04-16 PROCEDURE — U0004 COV-19 TEST NON-CDC HGH THRU: HCPCS

## 2022-04-16 PROCEDURE — U0005 INFEC AGEN DETEC AMPLI PROBE: HCPCS

## 2022-04-16 PROCEDURE — C9803 HOPD COVID-19 SPEC COLLECT: HCPCS

## 2022-04-18 ENCOUNTER — LAB (OUTPATIENT)
Dept: LAB | Facility: HOSPITAL | Age: 77
End: 2022-04-18

## 2022-04-18 DIAGNOSIS — Z13.6 SCREENING FOR CARDIOVASCULAR CONDITION: ICD-10-CM

## 2022-04-18 DIAGNOSIS — Z01.810 PREPROCEDURAL CARDIOVASCULAR EXAMINATION: ICD-10-CM

## 2022-04-18 DIAGNOSIS — Z01.818 OTHER SPECIFIED PRE-OPERATIVE EXAMINATION: ICD-10-CM

## 2022-04-18 LAB
ANION GAP SERPL CALCULATED.3IONS-SCNC: 10.9 MMOL/L (ref 5–15)
BASOPHILS # BLD AUTO: 0.06 10*3/MM3 (ref 0–0.2)
BASOPHILS NFR BLD AUTO: 1.2 % (ref 0–1.5)
BUN SERPL-MCNC: 12 MG/DL (ref 8–23)
BUN/CREAT SERPL: 15.4 (ref 7–25)
CALCIUM SPEC-SCNC: 9.2 MG/DL (ref 8.6–10.5)
CHLORIDE SERPL-SCNC: 105 MMOL/L (ref 98–107)
CO2 SERPL-SCNC: 28.1 MMOL/L (ref 22–29)
CREAT SERPL-MCNC: 0.78 MG/DL (ref 0.76–1.27)
DEPRECATED RDW RBC AUTO: 45.3 FL (ref 37–54)
EGFRCR SERPLBLD CKD-EPI 2021: 91.3 ML/MIN/1.73
EOSINOPHIL # BLD AUTO: 0.12 10*3/MM3 (ref 0–0.4)
EOSINOPHIL NFR BLD AUTO: 2.3 % (ref 0.3–6.2)
ERYTHROCYTE [DISTWIDTH] IN BLOOD BY AUTOMATED COUNT: 13.2 % (ref 12.3–15.4)
GLUCOSE SERPL-MCNC: 101 MG/DL (ref 65–99)
HCT VFR BLD AUTO: 45.6 % (ref 37.5–51)
HGB BLD-MCNC: 14.6 G/DL (ref 13–17.7)
IMM GRANULOCYTES # BLD AUTO: 0.01 10*3/MM3 (ref 0–0.05)
IMM GRANULOCYTES NFR BLD AUTO: 0.2 % (ref 0–0.5)
LYMPHOCYTES # BLD AUTO: 1.9 10*3/MM3 (ref 0.7–3.1)
LYMPHOCYTES NFR BLD AUTO: 36.9 % (ref 19.6–45.3)
MCH RBC QN AUTO: 29.6 PG (ref 26.6–33)
MCHC RBC AUTO-ENTMCNC: 32 G/DL (ref 31.5–35.7)
MCV RBC AUTO: 92.3 FL (ref 79–97)
MONOCYTES # BLD AUTO: 0.56 10*3/MM3 (ref 0.1–0.9)
MONOCYTES NFR BLD AUTO: 10.9 % (ref 5–12)
NEUTROPHILS NFR BLD AUTO: 2.5 10*3/MM3 (ref 1.7–7)
NEUTROPHILS NFR BLD AUTO: 48.5 % (ref 42.7–76)
NRBC BLD AUTO-RTO: 0 /100 WBC (ref 0–0.2)
PLATELET # BLD AUTO: 146 10*3/MM3 (ref 140–450)
PMV BLD AUTO: 9.9 FL (ref 6–12)
POTASSIUM SERPL-SCNC: 4.2 MMOL/L (ref 3.5–5.2)
RBC # BLD AUTO: 4.94 10*6/MM3 (ref 4.14–5.8)
SODIUM SERPL-SCNC: 144 MMOL/L (ref 136–145)
WBC NRBC COR # BLD: 5.15 10*3/MM3 (ref 3.4–10.8)

## 2022-04-18 PROCEDURE — 85025 COMPLETE CBC W/AUTO DIFF WBC: CPT

## 2022-04-18 PROCEDURE — 80048 BASIC METABOLIC PNL TOTAL CA: CPT

## 2022-04-18 PROCEDURE — 36415 COLL VENOUS BLD VENIPUNCTURE: CPT

## 2022-04-19 ENCOUNTER — ANESTHESIA EVENT (OUTPATIENT)
Dept: CARDIOLOGY | Facility: HOSPITAL | Age: 77
End: 2022-04-19

## 2022-04-19 ENCOUNTER — ANESTHESIA (OUTPATIENT)
Dept: CARDIOLOGY | Facility: HOSPITAL | Age: 77
End: 2022-04-19

## 2022-04-19 ENCOUNTER — HOSPITAL ENCOUNTER (OUTPATIENT)
Facility: HOSPITAL | Age: 77
Setting detail: HOSPITAL OUTPATIENT SURGERY
Discharge: HOME | End: 2022-04-19
Attending: INTERNAL MEDICINE | Admitting: INTERNAL MEDICINE

## 2022-04-19 VITALS
BODY MASS INDEX: 30.06 KG/M2 | OXYGEN SATURATION: 96 % | SYSTOLIC BLOOD PRESSURE: 175 MMHG | RESPIRATION RATE: 16 BRPM | TEMPERATURE: 98 F | DIASTOLIC BLOOD PRESSURE: 116 MMHG | HEART RATE: 105 BPM | WEIGHT: 210 LBS | HEIGHT: 70 IN

## 2022-04-19 DIAGNOSIS — R06.09 DOE (DYSPNEA ON EXERTION): ICD-10-CM

## 2022-04-19 DIAGNOSIS — I10 ESSENTIAL HYPERTENSION: ICD-10-CM

## 2022-04-19 DIAGNOSIS — I48.0 PAF (PAROXYSMAL ATRIAL FIBRILLATION): ICD-10-CM

## 2022-04-19 LAB
ACT BLD: 309 SECONDS (ref 82–152)
ACT BLD: 315 SECONDS (ref 82–152)
QT INTERVAL: 469 MS
QT INTERVAL: 473 MS

## 2022-04-19 PROCEDURE — 25010000002 PHENYLEPHRINE 10 MG/ML SOLUTION: Performed by: NURSE ANESTHETIST, CERTIFIED REGISTERED

## 2022-04-19 PROCEDURE — 93005 ELECTROCARDIOGRAM TRACING: CPT | Performed by: INTERNAL MEDICINE

## 2022-04-19 PROCEDURE — C1730 CATH, EP, 19 OR FEW ELECT: HCPCS | Performed by: INTERNAL MEDICINE

## 2022-04-19 PROCEDURE — 25010000002 ONDANSETRON PER 1 MG: Performed by: NURSE ANESTHETIST, CERTIFIED REGISTERED

## 2022-04-19 PROCEDURE — C1893 INTRO/SHEATH, FIXED,NON-PEEL: HCPCS | Performed by: INTERNAL MEDICINE

## 2022-04-19 PROCEDURE — C1732 CATH, EP, DIAG/ABL, 3D/VECT: HCPCS | Performed by: INTERNAL MEDICINE

## 2022-04-19 PROCEDURE — 93656 COMPRE EP EVAL ABLTJ ATR FIB: CPT | Performed by: INTERNAL MEDICINE

## 2022-04-19 PROCEDURE — 25010000002 DEXAMETHASONE PER 1 MG: Performed by: NURSE ANESTHETIST, CERTIFIED REGISTERED

## 2022-04-19 PROCEDURE — 85347 COAGULATION TIME ACTIVATED: CPT

## 2022-04-19 PROCEDURE — C1759 CATH, INTRA ECHOCARDIOGRAPHY: HCPCS | Performed by: INTERNAL MEDICINE

## 2022-04-19 PROCEDURE — 93005 ELECTROCARDIOGRAM TRACING: CPT | Performed by: NURSE PRACTITIONER

## 2022-04-19 PROCEDURE — 25010000002 PHENYLEPHRINE 10 MG/ML SOLUTION 5 ML VIAL: Performed by: NURSE ANESTHETIST, CERTIFIED REGISTERED

## 2022-04-19 PROCEDURE — 93010 ELECTROCARDIOGRAM REPORT: CPT | Performed by: INTERNAL MEDICINE

## 2022-04-19 PROCEDURE — 25010000002 PROPOFOL 10 MG/ML EMULSION: Performed by: NURSE ANESTHETIST, CERTIFIED REGISTERED

## 2022-04-19 PROCEDURE — 25010000002 PROTAMINE SULFATE PER 10 MG: Performed by: INTERNAL MEDICINE

## 2022-04-19 PROCEDURE — C1894 INTRO/SHEATH, NON-LASER: HCPCS | Performed by: INTERNAL MEDICINE

## 2022-04-19 PROCEDURE — 25010000002 HEPARIN (PORCINE) PER 1000 UNITS: Performed by: INTERNAL MEDICINE

## 2022-04-19 RX ORDER — HYDROCODONE BITARTRATE AND ACETAMINOPHEN 5; 325 MG/1; MG/1
1 TABLET ORAL EVERY 4 HOURS PRN
Status: DISCONTINUED | OUTPATIENT
Start: 2022-04-19 | End: 2022-04-19 | Stop reason: HOSPADM

## 2022-04-19 RX ORDER — LIDOCAINE HYDROCHLORIDE 20 MG/ML
INJECTION, SOLUTION INFILTRATION; PERINEURAL AS NEEDED
Status: DISCONTINUED | OUTPATIENT
Start: 2022-04-19 | End: 2022-04-19 | Stop reason: SURG

## 2022-04-19 RX ORDER — PROTAMINE SULFATE 10 MG/ML
INJECTION, SOLUTION INTRAVENOUS AS NEEDED
Status: DISCONTINUED | OUTPATIENT
Start: 2022-04-19 | End: 2022-04-19 | Stop reason: HOSPADM

## 2022-04-19 RX ORDER — ROCURONIUM BROMIDE 10 MG/ML
INJECTION, SOLUTION INTRAVENOUS AS NEEDED
Status: DISCONTINUED | OUTPATIENT
Start: 2022-04-19 | End: 2022-04-19 | Stop reason: SURG

## 2022-04-19 RX ORDER — DEXAMETHASONE SODIUM PHOSPHATE 4 MG/ML
INJECTION, SOLUTION INTRA-ARTICULAR; INTRALESIONAL; INTRAMUSCULAR; INTRAVENOUS; SOFT TISSUE AS NEEDED
Status: DISCONTINUED | OUTPATIENT
Start: 2022-04-19 | End: 2022-04-19 | Stop reason: SURG

## 2022-04-19 RX ORDER — HYDRALAZINE HYDROCHLORIDE 20 MG/ML
5 INJECTION INTRAMUSCULAR; INTRAVENOUS
Status: DISCONTINUED | OUTPATIENT
Start: 2022-04-19 | End: 2022-04-19 | Stop reason: HOSPADM

## 2022-04-19 RX ORDER — SODIUM CHLORIDE 0.9 % (FLUSH) 0.9 %
10 SYRINGE (ML) INJECTION EVERY 12 HOURS SCHEDULED
Status: DISCONTINUED | OUTPATIENT
Start: 2022-04-19 | End: 2022-04-19 | Stop reason: HOSPADM

## 2022-04-19 RX ORDER — HYDROCHLOROTHIAZIDE 25 MG/1
12.5 TABLET ORAL DAILY
Qty: 30 TABLET | Refills: 11 | Status: SHIPPED | OUTPATIENT
Start: 2022-04-19

## 2022-04-19 RX ORDER — HYDROCODONE BITARTRATE AND ACETAMINOPHEN 5; 325 MG/1; MG/1
1 TABLET ORAL ONCE AS NEEDED
Status: DISCONTINUED | OUTPATIENT
Start: 2022-04-19 | End: 2022-04-19 | Stop reason: HOSPADM

## 2022-04-19 RX ORDER — FLUMAZENIL 0.1 MG/ML
0.2 INJECTION INTRAVENOUS AS NEEDED
Status: DISCONTINUED | OUTPATIENT
Start: 2022-04-19 | End: 2022-04-19 | Stop reason: HOSPADM

## 2022-04-19 RX ORDER — EPHEDRINE SULFATE 50 MG/ML
5 INJECTION, SOLUTION INTRAVENOUS ONCE AS NEEDED
Status: DISCONTINUED | OUTPATIENT
Start: 2022-04-19 | End: 2022-04-19 | Stop reason: HOSPADM

## 2022-04-19 RX ORDER — PROPOFOL 10 MG/ML
VIAL (ML) INTRAVENOUS AS NEEDED
Status: DISCONTINUED | OUTPATIENT
Start: 2022-04-19 | End: 2022-04-19 | Stop reason: SURG

## 2022-04-19 RX ORDER — PROMETHAZINE HYDROCHLORIDE 12.5 MG/1
25 TABLET ORAL ONCE AS NEEDED
Status: DISCONTINUED | OUTPATIENT
Start: 2022-04-19 | End: 2022-04-19 | Stop reason: HOSPADM

## 2022-04-19 RX ORDER — SODIUM CHLORIDE 0.9 % (FLUSH) 0.9 %
3 SYRINGE (ML) INJECTION EVERY 12 HOURS SCHEDULED
Status: DISCONTINUED | OUTPATIENT
Start: 2022-04-19 | End: 2022-04-19 | Stop reason: HOSPADM

## 2022-04-19 RX ORDER — HEPARIN SODIUM 1000 [USP'U]/ML
INJECTION, SOLUTION INTRAVENOUS; SUBCUTANEOUS AS NEEDED
Status: DISCONTINUED | OUTPATIENT
Start: 2022-04-19 | End: 2022-04-19 | Stop reason: HOSPADM

## 2022-04-19 RX ORDER — PROMETHAZINE HYDROCHLORIDE 25 MG/1
25 SUPPOSITORY RECTAL ONCE AS NEEDED
Status: DISCONTINUED | OUTPATIENT
Start: 2022-04-19 | End: 2022-04-19 | Stop reason: HOSPADM

## 2022-04-19 RX ORDER — DIPHENHYDRAMINE HYDROCHLORIDE 50 MG/ML
12.5 INJECTION INTRAMUSCULAR; INTRAVENOUS
Status: DISCONTINUED | OUTPATIENT
Start: 2022-04-19 | End: 2022-04-19 | Stop reason: HOSPADM

## 2022-04-19 RX ORDER — LIDOCAINE HYDROCHLORIDE 10 MG/ML
0.1 INJECTION, SOLUTION EPIDURAL; INFILTRATION; INTRACAUDAL; PERINEURAL ONCE AS NEEDED
Status: DISCONTINUED | OUTPATIENT
Start: 2022-04-19 | End: 2022-04-19 | Stop reason: HOSPADM

## 2022-04-19 RX ORDER — LIDOCAINE HYDROCHLORIDE AND EPINEPHRINE 10; 10 MG/ML; UG/ML
INJECTION, SOLUTION INFILTRATION; PERINEURAL AS NEEDED
Status: DISCONTINUED | OUTPATIENT
Start: 2022-04-19 | End: 2022-04-19 | Stop reason: HOSPADM

## 2022-04-19 RX ORDER — SODIUM CHLORIDE, SODIUM LACTATE, POTASSIUM CHLORIDE, CALCIUM CHLORIDE 600; 310; 30; 20 MG/100ML; MG/100ML; MG/100ML; MG/100ML
9 INJECTION, SOLUTION INTRAVENOUS CONTINUOUS
Status: DISCONTINUED | OUTPATIENT
Start: 2022-04-19 | End: 2022-04-19 | Stop reason: HOSPADM

## 2022-04-19 RX ORDER — SODIUM CHLORIDE 9 MG/ML
INJECTION, SOLUTION INTRAVENOUS CONTINUOUS PRN
Status: COMPLETED | OUTPATIENT
Start: 2022-04-19 | End: 2022-04-19

## 2022-04-19 RX ORDER — SODIUM CHLORIDE 9 MG/ML
75 INJECTION, SOLUTION INTRAVENOUS CONTINUOUS
Status: DISCONTINUED | OUTPATIENT
Start: 2022-04-19 | End: 2022-04-19 | Stop reason: HOSPADM

## 2022-04-19 RX ORDER — FENTANYL CITRATE 50 UG/ML
25 INJECTION, SOLUTION INTRAMUSCULAR; INTRAVENOUS
Status: DISCONTINUED | OUTPATIENT
Start: 2022-04-19 | End: 2022-04-19 | Stop reason: HOSPADM

## 2022-04-19 RX ORDER — SODIUM CHLORIDE 0.9 % (FLUSH) 0.9 %
3-10 SYRINGE (ML) INJECTION AS NEEDED
Status: DISCONTINUED | OUTPATIENT
Start: 2022-04-19 | End: 2022-04-19 | Stop reason: HOSPADM

## 2022-04-19 RX ORDER — HYDRALAZINE HYDROCHLORIDE 20 MG/ML
10 INJECTION INTRAMUSCULAR; INTRAVENOUS ONCE
Status: DISCONTINUED | OUTPATIENT
Start: 2022-04-19 | End: 2022-04-19 | Stop reason: HOSPADM

## 2022-04-19 RX ORDER — FAMOTIDINE 10 MG/ML
20 INJECTION, SOLUTION INTRAVENOUS ONCE
Status: COMPLETED | OUTPATIENT
Start: 2022-04-19 | End: 2022-04-19

## 2022-04-19 RX ORDER — SODIUM CHLORIDE 0.9 % (FLUSH) 0.9 %
10 SYRINGE (ML) INJECTION AS NEEDED
Status: DISCONTINUED | OUTPATIENT
Start: 2022-04-19 | End: 2022-04-19 | Stop reason: HOSPADM

## 2022-04-19 RX ORDER — ONDANSETRON 2 MG/ML
4 INJECTION INTRAMUSCULAR; INTRAVENOUS ONCE AS NEEDED
Status: DISCONTINUED | OUTPATIENT
Start: 2022-04-19 | End: 2022-04-19 | Stop reason: HOSPADM

## 2022-04-19 RX ORDER — NALOXONE HCL 0.4 MG/ML
0.2 VIAL (ML) INJECTION AS NEEDED
Status: DISCONTINUED | OUTPATIENT
Start: 2022-04-19 | End: 2022-04-19 | Stop reason: HOSPADM

## 2022-04-19 RX ORDER — LIDOCAINE HYDROCHLORIDE 10 MG/ML
0.5 INJECTION, SOLUTION EPIDURAL; INFILTRATION; INTRACAUDAL; PERINEURAL ONCE AS NEEDED
Status: DISCONTINUED | OUTPATIENT
Start: 2022-04-19 | End: 2022-04-19 | Stop reason: HOSPADM

## 2022-04-19 RX ORDER — HYDROMORPHONE HYDROCHLORIDE 1 MG/ML
0.25 INJECTION, SOLUTION INTRAMUSCULAR; INTRAVENOUS; SUBCUTANEOUS
Status: DISCONTINUED | OUTPATIENT
Start: 2022-04-19 | End: 2022-04-19 | Stop reason: HOSPADM

## 2022-04-19 RX ORDER — ONDANSETRON 2 MG/ML
INJECTION INTRAMUSCULAR; INTRAVENOUS AS NEEDED
Status: DISCONTINUED | OUTPATIENT
Start: 2022-04-19 | End: 2022-04-19 | Stop reason: SURG

## 2022-04-19 RX ORDER — VALSARTAN 320 MG/1
320 TABLET ORAL ONCE
Status: COMPLETED | OUTPATIENT
Start: 2022-04-19 | End: 2022-04-19

## 2022-04-19 RX ORDER — ACETAMINOPHEN 650 MG/1
650 SUPPOSITORY RECTAL EVERY 4 HOURS PRN
Status: DISCONTINUED | OUTPATIENT
Start: 2022-04-19 | End: 2022-04-19 | Stop reason: HOSPADM

## 2022-04-19 RX ORDER — LABETALOL HYDROCHLORIDE 5 MG/ML
5 INJECTION, SOLUTION INTRAVENOUS
Status: DISCONTINUED | OUTPATIENT
Start: 2022-04-19 | End: 2022-04-19 | Stop reason: HOSPADM

## 2022-04-19 RX ORDER — ACETAMINOPHEN 325 MG/1
650 TABLET ORAL EVERY 4 HOURS PRN
Status: DISCONTINUED | OUTPATIENT
Start: 2022-04-19 | End: 2022-04-19 | Stop reason: HOSPADM

## 2022-04-19 RX ORDER — METOPROLOL SUCCINATE 25 MG/1
TABLET, EXTENDED RELEASE ORAL
Qty: 270 TABLET | Refills: 3
Start: 2022-04-19

## 2022-04-19 RX ORDER — DIPHENHYDRAMINE HCL 25 MG
25 CAPSULE ORAL
Status: DISCONTINUED | OUTPATIENT
Start: 2022-04-19 | End: 2022-04-19 | Stop reason: HOSPADM

## 2022-04-19 RX ORDER — PHENYLEPHRINE HYDROCHLORIDE 10 MG/ML
INJECTION INTRAVENOUS AS NEEDED
Status: DISCONTINUED | OUTPATIENT
Start: 2022-04-19 | End: 2022-04-19 | Stop reason: SURG

## 2022-04-19 RX ADMIN — SODIUM CHLORIDE 75 ML/HR: 9 INJECTION, SOLUTION INTRAVENOUS at 10:30

## 2022-04-19 RX ADMIN — PROPOFOL 150 MG: 10 INJECTION, EMULSION INTRAVENOUS at 12:46

## 2022-04-19 RX ADMIN — ONDANSETRON 4 MG: 2 INJECTION INTRAMUSCULAR; INTRAVENOUS at 13:54

## 2022-04-19 RX ADMIN — PHENYLEPHRINE HYDROCHLORIDE 100 MCG: 10 INJECTION, SOLUTION INTRAVENOUS at 13:46

## 2022-04-19 RX ADMIN — VALSARTAN 320 MG: 320 TABLET, FILM COATED ORAL at 15:46

## 2022-04-19 RX ADMIN — DEXAMETHASONE SODIUM PHOSPHATE 8 MG: 4 INJECTION, SOLUTION INTRAMUSCULAR; INTRAVENOUS at 12:52

## 2022-04-19 RX ADMIN — PHENYLEPHRINE HYDROCHLORIDE 100 MCG: 10 INJECTION, SOLUTION INTRAVENOUS at 12:56

## 2022-04-19 RX ADMIN — FAMOTIDINE 20 MG: 10 INJECTION INTRAVENOUS at 12:05

## 2022-04-19 RX ADMIN — PHENYLEPHRINE HYDROCHLORIDE 100 MCG: 10 INJECTION, SOLUTION INTRAVENOUS at 13:01

## 2022-04-19 RX ADMIN — PHENYLEPHRINE HYDROCHLORIDE 0.4 MCG/KG/MIN: 10 INJECTION INTRAVENOUS at 13:03

## 2022-04-19 RX ADMIN — PHENYLEPHRINE HYDROCHLORIDE 100 MCG: 10 INJECTION, SOLUTION INTRAVENOUS at 13:04

## 2022-04-19 RX ADMIN — ROCURONIUM BROMIDE 50 MG: 50 INJECTION INTRAVENOUS at 12:47

## 2022-04-19 RX ADMIN — ROCURONIUM BROMIDE 10 MG: 50 INJECTION INTRAVENOUS at 13:38

## 2022-04-19 RX ADMIN — SUGAMMADEX 200 MG: 100 INJECTION, SOLUTION INTRAVENOUS at 14:05

## 2022-04-19 RX ADMIN — PHENYLEPHRINE HYDROCHLORIDE 100 MCG: 10 INJECTION, SOLUTION INTRAVENOUS at 13:52

## 2022-04-19 RX ADMIN — LIDOCAINE HYDROCHLORIDE 100 MG: 20 INJECTION, SOLUTION INFILTRATION; PERINEURAL at 12:46

## 2022-04-21 ENCOUNTER — TELEPHONE (OUTPATIENT)
Dept: CARDIOLOGY | Facility: CLINIC | Age: 77
End: 2022-04-21

## 2022-04-21 NOTE — TELEPHONE ENCOUNTER
CROWI--Pt's was ablated 4/19. His wife called today stating he has lots of redness on his upper thighs (both) She is sending a picture so you can check it out. I will forward when I receive it...Hali

## 2022-04-22 ENCOUNTER — TELEPHONE (OUTPATIENT)
Dept: CARDIOLOGY | Facility: CLINIC | Age: 77
End: 2022-04-22

## 2022-04-22 NOTE — TELEPHONE ENCOUNTER
Patient called the answering service tonight to report an elevated BP of 180/111.  HR 64.  He is asymptomatic.  I advised him to take an extra dose of Toprol and try and relax, sitting in a dark room if possible.  He will call me if anything changes symptoms wise or if his BP gets worse.

## 2022-05-01 ENCOUNTER — HOSPITAL ENCOUNTER (EMERGENCY)
Facility: HOSPITAL | Age: 77
Discharge: HOME | End: 2022-05-01
Attending: EMERGENCY MEDICINE | Admitting: EMERGENCY MEDICINE

## 2022-05-01 VITALS
HEIGHT: 70 IN | SYSTOLIC BLOOD PRESSURE: 178 MMHG | TEMPERATURE: 97.8 F | BODY MASS INDEX: 30.06 KG/M2 | RESPIRATION RATE: 18 BRPM | WEIGHT: 210 LBS | OXYGEN SATURATION: 97 % | DIASTOLIC BLOOD PRESSURE: 99 MMHG | HEART RATE: 77 BPM

## 2022-05-01 DIAGNOSIS — Z79.01 CHRONIC ANTICOAGULATION: ICD-10-CM

## 2022-05-01 DIAGNOSIS — T14.8XXA HEMATOMA: Primary | ICD-10-CM

## 2022-05-01 LAB
ANION GAP SERPL CALCULATED.3IONS-SCNC: 11 MMOL/L (ref 5–15)
BASOPHILS # BLD AUTO: 0.04 10*3/MM3 (ref 0–0.2)
BASOPHILS NFR BLD AUTO: 0.7 % (ref 0–1.5)
BUN SERPL-MCNC: 18 MG/DL (ref 8–23)
BUN/CREAT SERPL: 24.3 (ref 7–25)
CALCIUM SPEC-SCNC: 8.7 MG/DL (ref 8.6–10.5)
CHLORIDE SERPL-SCNC: 105 MMOL/L (ref 98–107)
CO2 SERPL-SCNC: 26 MMOL/L (ref 22–29)
CREAT SERPL-MCNC: 0.74 MG/DL (ref 0.76–1.27)
DEPRECATED RDW RBC AUTO: 44.9 FL (ref 37–54)
EGFRCR SERPLBLD CKD-EPI 2021: 92.7 ML/MIN/1.73
EOSINOPHIL # BLD AUTO: 0.13 10*3/MM3 (ref 0–0.4)
EOSINOPHIL NFR BLD AUTO: 2.3 % (ref 0.3–6.2)
ERYTHROCYTE [DISTWIDTH] IN BLOOD BY AUTOMATED COUNT: 13.5 % (ref 12.3–15.4)
GLUCOSE SERPL-MCNC: 98 MG/DL (ref 65–99)
HCT VFR BLD AUTO: 40.7 % (ref 37.5–51)
HGB BLD-MCNC: 13.1 G/DL (ref 13–17.7)
IMM GRANULOCYTES # BLD AUTO: 0.01 10*3/MM3 (ref 0–0.05)
IMM GRANULOCYTES NFR BLD AUTO: 0.2 % (ref 0–0.5)
LYMPHOCYTES # BLD AUTO: 1.46 10*3/MM3 (ref 0.7–3.1)
LYMPHOCYTES NFR BLD AUTO: 25.8 % (ref 19.6–45.3)
MCH RBC QN AUTO: 29.3 PG (ref 26.6–33)
MCHC RBC AUTO-ENTMCNC: 32.2 G/DL (ref 31.5–35.7)
MCV RBC AUTO: 91.1 FL (ref 79–97)
MONOCYTES # BLD AUTO: 0.55 10*3/MM3 (ref 0.1–0.9)
MONOCYTES NFR BLD AUTO: 9.7 % (ref 5–12)
NEUTROPHILS NFR BLD AUTO: 3.47 10*3/MM3 (ref 1.7–7)
NEUTROPHILS NFR BLD AUTO: 61.3 % (ref 42.7–76)
NRBC BLD AUTO-RTO: 0 /100 WBC (ref 0–0.2)
PLATELET # BLD AUTO: 148 10*3/MM3 (ref 140–450)
PMV BLD AUTO: 9.3 FL (ref 6–12)
POTASSIUM SERPL-SCNC: 3.8 MMOL/L (ref 3.5–5.2)
RBC # BLD AUTO: 4.47 10*6/MM3 (ref 4.14–5.8)
SODIUM SERPL-SCNC: 142 MMOL/L (ref 136–145)
WBC NRBC COR # BLD: 5.66 10*3/MM3 (ref 3.4–10.8)

## 2022-05-01 PROCEDURE — 80048 BASIC METABOLIC PNL TOTAL CA: CPT | Performed by: EMERGENCY MEDICINE

## 2022-05-01 PROCEDURE — 85025 COMPLETE CBC W/AUTO DIFF WBC: CPT | Performed by: EMERGENCY MEDICINE

## 2022-05-01 PROCEDURE — 99283 EMERGENCY DEPT VISIT LOW MDM: CPT

## 2022-05-01 NOTE — ED PROVIDER NOTES
EMERGENCY DEPARTMENT ENCOUNTER    Room Number:  15/15  Date of encounter:  5/1/2022  PCP: Jatin Vergara MD  Historian: Patient, wife      HPI:  Chief Complaint: Groin swelling  A complete HPI/ROS/PMH/PSH/SH/FH are unobtainable due to: None    Context: Carlo Archer is a 77 y.o. male who presents to the ED c/o groin swelling.  He states that 2 weeks ago he had cardiac ablation for atrial fibrillation.  Over the last week he has noticed some increased swelling to his bilateral groin, right worse than left.  He also reports having very mild pain to his left inner thigh that occurs only with palpation.  He is on Eliquis.      PAST MEDICAL HISTORY  Active Ambulatory Problems     Diagnosis Date Noted   • Essential hypertension 11/15/2017   • CAD (coronary artery disease) 11/15/2017   • PAF (paroxysmal atrial fibrillation) ([facility]) 11/15/2017   • DIONNE (obstructive sleep apnea) 04/22/2021   • Class 1 obesity with alveolar hypoventilation, serious comorbidity, and body mass index (BMI) of 30.0 to 30.9 in adult ([facility]) 11/17/2021   • BROWN (dyspnea on exertion) 11/17/2021     Resolved Ambulatory Problems     Diagnosis Date Noted   • New onset atrial fibrillation ([facility]) 01/10/2017     Past Medical History:   Diagnosis Date   • Hypertension          PAST SURGICAL HISTORY  Past Surgical History:   Procedure Laterality Date   • CARDIAC ELECTROPHYSIOLOGY PROCEDURE N/A 4/19/2022    Procedure: Ablation atrial fibrillation;  Surgeon: Yves Samson MD;  Location: [facility] INVASIVE LOCATION;  Service: Cardiovascular;  Laterality: N/A;   • CHOLECYSTECTOMY     • CORONARY ANGIOPLASTY     • HIP SURGERY  2007    Replaced right hip   • JOINT REPLACEMENT Right     hip   • SHOULDER SURGERY  11/2020    Reverse shoulder   • TONSILLECTOMY     • VASECTOMY           FAMILY HISTORY  Family History   Problem Relation Age of Onset   • Heart attack Father    • Hypertension Sister    • Heart disease Sister    • Sudden death Brother          SOCIAL  HISTORY  Social History     Socioeconomic History   • Marital status:    Tobacco Use   • Smoking status: Former Smoker     Packs/day: 1.50     Years: 34.00     Pack years: 51.00     Types: Cigarettes     Start date: 1960     Quit date: 1994     Years since quittin.6   • Smokeless tobacco: Never Used   • Tobacco comment: Quit in    Vaping Use   • Vaping Use: Never used   Substance and Sexual Activity   • Alcohol use: Not Currently     Alcohol/week: 0.0 standard drinks     Comment: occasional/ caffeine use 1 cup daily   • Drug use: No   • Sexual activity: Yes     Partners: Female         ALLERGIES  Bee venom, Clindamycin phosphate, Penicillin v, Penicillins, Streptomycin, and Levaquin [levofloxacin]        REVIEW OF SYSTEMS  Review of Systems     All systems reviewed and negative except for those discussed in HPI.       PHYSICAL EXAM    I have reviewed the triage vital signs and nursing notes.    ED Triage Vitals   Temp Heart Rate Resp BP SpO2   22 0740 22 0740 22 0740 22 0751 22 0740   97.8 °F (36.6 °C) 105 18 (!) 186/108 94 %      Temp src Heart Rate Source Patient Position BP Location FiO2 (%)   -- -- -- -- --              Physical Exam  GENERAL: not distressed  HENT: nares patent  EYES: no scleral icterus  CV: regular rhythm, regular rate, pea-sized hematoma nodule to the left inguinal region and grape sized nodule to the right inguinal region.  This is a firm nodule.  RESPIRATORY: normal effort  ABDOMEN: soft, nontender  MUSCULOSKELETAL: no deformity  NEURO: alert, moves all extremities, follows commands  SKIN: warm, dry, ecchymosis noted to bilateral inner thighs        LAB RESULTS  Recent Results (from the past 24 hour(s))   Basic Metabolic Panel    Collection Time: 22  8:00 AM    Specimen: Blood   Result Value Ref Range    Glucose 98 65 - 99 mg/dL    BUN 18 8 - 23 mg/dL    Creatinine 0.74 (L) 0.76 - 1.27 mg/dL    Sodium 142 136 - 145 mmol/L     Potassium 3.8 3.5 - 5.2 mmol/L    Chloride 105 98 - 107 mmol/L    CO2 26.0 22.0 - 29.0 mmol/L    Calcium 8.7 8.6 - 10.5 mg/dL    BUN/Creatinine Ratio 24.3 7.0 - 25.0    Anion Gap 11.0 5.0 - 15.0 mmol/L    eGFR 92.7 >60.0 mL/min/1.73   CBC Auto Differential    Collection Time: 05/01/22  8:00 AM    Specimen: Blood   Result Value Ref Range    WBC 5.66 3.40 - 10.80 10*3/mm3    RBC 4.47 4.14 - 5.80 10*6/mm3    Hemoglobin 13.1 13.0 - 17.7 g/dL    Hematocrit 40.7 37.5 - 51.0 %    MCV 91.1 79.0 - 97.0 fL    MCH 29.3 26.6 - 33.0 pg    MCHC 32.2 31.5 - 35.7 g/dL    RDW 13.5 12.3 - 15.4 %    RDW-SD 44.9 37.0 - 54.0 fl    MPV 9.3 6.0 - 12.0 fL    Platelets 148 140 - 450 10*3/mm3    Neutrophil % 61.3 42.7 - 76.0 %    Lymphocyte % 25.8 19.6 - 45.3 %    Monocyte % 9.7 5.0 - 12.0 %    Eosinophil % 2.3 0.3 - 6.2 %    Basophil % 0.7 0.0 - 1.5 %    Immature Grans % 0.2 0.0 - 0.5 %    Neutrophils, Absolute 3.47 1.70 - 7.00 10*3/mm3    Lymphocytes, Absolute 1.46 0.70 - 3.10 10*3/mm3    Monocytes, Absolute 0.55 0.10 - 0.90 10*3/mm3    Eosinophils, Absolute 0.13 0.00 - 0.40 10*3/mm3    Basophils, Absolute 0.04 0.00 - 0.20 10*3/mm3    Immature Grans, Absolute 0.01 0.00 - 0.05 10*3/mm3    nRBC 0.0 0.0 - 0.2 /100 WBC       Ordered the above labs and independently reviewed the results.        RADIOLOGY  No Radiology Exams Resulted Within Past 24 Hours    I ordered the above noted radiological studies. Reviewed by me and discussed with radiologist.  See dictation for official radiology interpretation.      PROCEDURES    Procedures      MEDICATIONS GIVEN IN ER    Medications - No data to display      PROGRESS, DATA ANALYSIS, CONSULTS, AND MEDICAL DECISION MAKING    All labs have been independently reviewed by me.  All radiology studies have been reviewed by me and discussed with radiologist dictating the report.   EKG's independently viewed and interpreted by me.  Discussion below represents my analysis of pertinent findings related to  patient's condition, differential diagnosis, treatment plan and final disposition.    Patient presents with presumed hematoma to the bilateral inguinal regions.  These are quite small.  I have low suspicion for any significant bleeding.  I will check his hemoglobin, however to make sure there is no hemoglobin drop that would be suggestive of a retroperitoneal bleed or other more internal bleeding.    ED Course as of 05/01/22 0843   Sun May 01, 2022   0752 On medical chart review, patient seen Dr. Samson on 3/23/2022.  Patient seen for paroxysmal atrial fibrillation.  Patient then had subsequent ablation.  This was performed on 4/19/2022. [TD]   0839 Hemoglobin: 13.1 [TD]      ED Course User Index  [TD] Rocky Hill II, MD       Patient has a normal hemoglobin.  His clinical exam looks excellent externally.  He has expected postop bruising with small hematomas to the groin.  I gave him good return precautions should he develop pain, further increase in hematoma size, or syncope.       PPE: The patient wore a surgical mask throughout the entire patient encounter. I wore an N95.    AS OF 08:43 EDT VITALS:    BP - 178/99  HR - 77  TEMP - 97.8 °F (36.6 °C)  O2 SATS - 97%        DIAGNOSIS  Final diagnoses:   Hematoma to bilateral groin   Chronic anticoagulation         DISPOSITION  DISCHARGE    FOLLOW-UP  [facility] Emergency Department  [address] [address] [phone]  Go to   If symptoms worsen    Yves Samson MD  [address]  [facility] [id number] [phone]    Call in 1 day  to update on symptoms         Medication List      No changes were made to your prescriptions during this visit.                  Rocky Hill II, MD  05/01/22 0843

## 2022-05-01 NOTE — ED TRIAGE NOTES
Pt states had cardiac ablation 4/19/22 by Dr. Samson with access point to left groin states incision is in left groin and having mass grow in size. Pt is on eliquis.

## 2022-05-17 ENCOUNTER — OFFICE VISIT (OUTPATIENT)
Dept: CARDIOLOGY | Facility: CLINIC | Age: 77
End: 2022-05-17

## 2022-05-17 VITALS
HEIGHT: 70 IN | HEART RATE: 52 BPM | SYSTOLIC BLOOD PRESSURE: 130 MMHG | BODY MASS INDEX: 31.21 KG/M2 | WEIGHT: 218 LBS | DIASTOLIC BLOOD PRESSURE: 88 MMHG

## 2022-05-17 DIAGNOSIS — Z98.890 H/O CARDIAC RADIOFREQUENCY ABLATION: ICD-10-CM

## 2022-05-17 DIAGNOSIS — I10 ESSENTIAL HYPERTENSION: ICD-10-CM

## 2022-05-17 DIAGNOSIS — I25.10 CORONARY ARTERY DISEASE INVOLVING NATIVE CORONARY ARTERY OF NATIVE HEART WITHOUT ANGINA PECTORIS: ICD-10-CM

## 2022-05-17 DIAGNOSIS — G47.33 OSA (OBSTRUCTIVE SLEEP APNEA): ICD-10-CM

## 2022-05-17 DIAGNOSIS — I48.0 PAF (PAROXYSMAL ATRIAL FIBRILLATION): Primary | ICD-10-CM

## 2022-05-17 PROCEDURE — 93000 ELECTROCARDIOGRAM COMPLETE: CPT | Performed by: NURSE PRACTITIONER

## 2022-05-17 PROCEDURE — 99213 OFFICE O/P EST LOW 20 MIN: CPT | Performed by: NURSE PRACTITIONER

## 2022-05-17 NOTE — PROGRESS NOTES
Date of Office Visit: 2022  Encounter Provider: MARCI Lott  Place of Service: [facility] CARDIOLOGY  Patient Name: Carlo Archer  :1945    Chief Complaint   Patient presents with   • Atrial Fibrillation     1 mnth follow up   :     HPI: Carlo Archer is a 77 y.o. male who follows with Dr. Montero for CAD, PCI  and PAF.     Referred to Dr. Samson in 2021--he did okay until this year, increase in freq/duration, ablation recommended.     PVI 2022.     ED  for groin hematoma---H/H stable, negative eval by MD, dc'd home.     Presents today for follow up.     He is doing okay---he has had 2-3 episodes since ablation--couple were 2-3 hours in duration, most recent about 5 days ago, only lasted and hour--better than they were before procedure.     No chest pain, dyspnea, PND, orthopnea, dizziness or edema.     Left inner thigh still with some soreness, much improved, no bruising or hematoma.     Apixaban for AC--no issues.      Past Medical History:   Diagnosis Date   • CAD (coronary artery disease)    • Class 1 obesity with alveolar hypoventilation, serious comorbidity, and body mass index (BMI) of 30.0 to 30.9 in adult ([facility])    • BROWN (dyspnea on exertion)    • Hypertension    • DIONNE (obstructive sleep apnea)    • PAF (paroxysmal atrial fibrillation) ([facility])        Past Surgical History:   Procedure Laterality Date   • CARDIAC ELECTROPHYSIOLOGY PROCEDURE N/A 2022    Procedure: Ablation atrial fibrillation;  Surgeon: Yves Samson MD;  Location: [facility] INVASIVE LOCATION;  Service: Cardiovascular;  Laterality: N/A;   • CHOLECYSTECTOMY     • CORONARY ANGIOPLASTY     • HIP SURGERY      Replaced right hip   • JOINT REPLACEMENT Right     hip   • SHOULDER SURGERY  2020    Reverse shoulder   • TONSILLECTOMY     • VASECTOMY         Social History     Socioeconomic History   • Marital status:    Tobacco Use   • Smoking status: Former Smoker      Packs/day: 1.50     Years: 34.00     Pack years: 51.00     Types: Cigarettes     Start date: 1960     Quit date: 1994     Years since quittin.6   • Smokeless tobacco: Never Used   • Tobacco comment: Quit in    Vaping Use   • Vaping Use: Never used   Substance and Sexual Activity   • Alcohol use: Not Currently     Alcohol/week: 0.0 standard drinks     Comment: occasional/ caffeine use 1 cup daily   • Drug use: No   • Sexual activity: Yes     Partners: Female       Family History   Problem Relation Age of Onset   • Heart attack Father    • Hypertension Sister    • Heart disease Sister    • Sudden death Brother        Review of Systems   Constitutional: Negative for chills, fever and malaise/fatigue.   Cardiovascular: Negative for chest pain, dyspnea on exertion, leg swelling, near-syncope, orthopnea, palpitations, paroxysmal nocturnal dyspnea and syncope.   Respiratory: Negative for cough and shortness of breath.    Hematologic/Lymphatic: Negative.    Musculoskeletal: Negative for joint pain, joint swelling and myalgias.   Gastrointestinal: Negative for abdominal pain, diarrhea, melena, nausea and vomiting.   Genitourinary: Negative for frequency and hematuria.   Neurological: Negative for light-headedness, numbness, paresthesias and seizures.   Allergic/Immunologic: Negative.    All other systems reviewed and are negative.      Allergies   Allergen Reactions   • Bee Venom Anaphylaxis   • Clindamycin Phosphate Anaphylaxis     Allergy to streptamycin    • Penicillin V Anaphylaxis   • Penicillins Anaphylaxis   • Streptomycin Anaphylaxis   • Levaquin [Levofloxacin] Myalgia     tendonitis         Current Outpatient Medications:   •  apixaban (ELIQUIS) 5 MG tablet tablet, Take 1 tablet by mouth Every 12 (Twelve) Hours., Disp: 180 tablet, Rfl: 3  •  atorvastatin (LIPITOR) 40 MG tablet, take 1 tablet by mouth at bedtime, Disp: 30 tablet, Rfl: 5  •  brimonidine-timolol (Combigan) 0.2-0.5 % ophthalmic  "solution, Administer 1 drop to the right eye Every 12 (Twelve) Hours., Disp: , Rfl:   •  cetirizine (zyrTEC) 10 MG tablet, Take 10 mg by mouth As Needed., Disp: , Rfl:   •  dorzolamide (TRUSOPT) 2 % ophthalmic solution, INSTILL 1 DROP INTO BOTH EYES 2 TIMES A DAY, Disp: , Rfl:   •  EPINEPHrine (EPIPEN) 0.3 MG/0.3ML solution auto-injector injection, EpiPen TRINY; Patient Sig: EpiPen TRINY ; 0; 06-Jan-2015; Active, Disp: , Rfl:   •  erythromycin base (E-MYCIN) 500 MG tablet, ONLY FOR DENTAL APPTS, Disp: , Rfl:   •  esomeprazole (nexIUM) 40 MG capsule, Take  by mouth Daily., Disp: , Rfl:   •  fluticasone (FLONASE) 50 MCG/ACT nasal spray, into each nostril As Needed., Disp: , Rfl:   •  hydroCHLOROthiazide (HYDRODIURIL) 25 MG tablet, Take 0.5 tablets by mouth Daily., Disp: 30 tablet, Rfl: 11  •  meloxicam (MOBIC) 15 MG tablet, Take 15 mg by mouth Daily., Disp: , Rfl: 0  •  metoprolol succinate XL (TOPROL-XL) 25 MG 24 hr tablet, 1 TAB DAILY, Disp: 270 tablet, Rfl: 3  •  rOPINIRole (REQUIP) 0.5 MG tablet, Take 0.5 mg by mouth Every Night. Take 1 hour before bedtime., Disp: , Rfl:   •  tadalafil (CIALIS) 5 MG tablet, Take 5 mg by mouth Daily., Disp: , Rfl:   •  tamsulosin (FLOMAX) 0.4 MG capsule 24 hr capsule, Take 1 capsule by mouth 2 (Two) Times a Day., Disp: , Rfl:   •  valsartan (DIOVAN) 320 MG tablet, Take 320 mg by mouth Daily., Disp: , Rfl:       Objective:     Vitals:    05/17/22 1154   BP: 130/88   Pulse: 52   Weight: 98.9 kg (218 lb)   Height: 177.8 cm (70\")     Body mass index is 31.28 kg/m².    PHYSICAL EXAM:    Vitals Reviewed.   General Appearance: No acute distress, well developed and well nourished.   Eyes: Conjunctiva and lids: No erythema, swelling, or discharge. Sclera non-icteric.   HENT: Atraumatic, normocephalic. External eyes, ears, and nose normal.   Respiratory: No signs of respiratory distress. Respiration rhythm and depth normal.   Clear to auscultation. No rales, crackles, rhonchi, or wheezing " auscultated.   Cardiovascular:  Heart Rate and Rhythm: Normal, Heart Sounds: Normal S1 and S2. No S3 or S4 noted.  Murmurs: No murmurs noted. No rubs, thrills, or gallops.   Arterial Pulses:  Posterior tibialis and dorsalis pedis pulses normal.   Lower Extremities: No edema noted.  Gastrointestinal:  Abdomen soft, non-distended, non-tender.   Musculoskeletal: Normal movement of extremities  Skin: Warm and dry.   Psychiatric: Patient alert and oriented to person, place, and time. Speech and behavior appropriate. Normal mood and affect.       ECG 12 Lead    Date/Time: 5/17/2022 11:44 AM  Performed by: Shoshana Freedman APRN  Authorized by: Shoshana Freedman APRN   Comparison: compared with previous ECG   Similar to previous ECG  Rhythm: sinus rhythm  BPM: 52                Assessment:       Diagnosis Plan   1. PAF (paroxysmal atrial fibrillation) ([facility])  ECG 12 Lead   2. H/O cardiac radiofrequency ablation--PVI 4/2022  ECG 12 Lead   3. Essential hypertension     4. DIONNE (obstructive sleep apnea)     5. Coronary artery disease involving native coronary artery of native heart without angina pectoris            Plan:       1.-2. PAF, s/p PVI 4/19--2-3 episodes since procedure, most recent 5 days ago, only lasted an hour. Continue BB and AC.     3. HTN, controlled.     4. CAD, no angina, follows w/Dr. Montero.     Follow up Dr. Samson in 3 months and keep Dr. oMntero appt scheduled in August.     As always, it has been a pleasure to participate in your patient's care.      Sincerely,         MARCI Artis

## 2022-05-20 NOTE — ANESTHESIA POSTPROCEDURE EVALUATION
Patient: Carlo Archer    Procedure Summary     Date: 04/19/22 Room / Location: [facility]EP [facility] INVASIVE [facility]    Anesthesia Start: 1230 Anesthesia Stop: 1420    Procedures:       Ablation atrial fibrillation (N/A )      3D MAPPING CARTO EP (N/A ) Diagnosis:       PAF (paroxysmal atrial fibrillation) (HCC)      BROWN (dyspnea on exertion)      Essential hypertension      (aF)    Providers: Yves Samson MD Provider: Elidia Wagoner MD    Anesthesia Type: general ASA Status: 4          Anesthesia Type: general    Vitals  Vitals Value Taken Time   /116 04/19/22 1845   Temp     Pulse 105 04/19/22 1845   Resp 16 04/19/22 1845   SpO2 96 % 04/19/22 1845           Post Anesthesia Care and Evaluation      Comments: Procedure on 4/19/22; Post op note entered for chart completion purposes

## 2022-08-09 ENCOUNTER — OFFICE VISIT (OUTPATIENT)
Dept: CARDIOLOGY | Facility: CLINIC | Age: 77
End: 2022-08-09

## 2022-08-09 VITALS
HEIGHT: 70 IN | WEIGHT: 206 LBS | BODY MASS INDEX: 29.49 KG/M2 | DIASTOLIC BLOOD PRESSURE: 80 MMHG | HEART RATE: 49 BPM | OXYGEN SATURATION: 98 % | SYSTOLIC BLOOD PRESSURE: 126 MMHG

## 2022-08-09 DIAGNOSIS — I48.0 PAF (PAROXYSMAL ATRIAL FIBRILLATION): Primary | ICD-10-CM

## 2022-08-09 DIAGNOSIS — I10 ESSENTIAL HYPERTENSION: ICD-10-CM

## 2022-08-09 PROCEDURE — 99214 OFFICE O/P EST MOD 30 MIN: CPT | Performed by: INTERNAL MEDICINE

## 2022-08-09 NOTE — PROGRESS NOTES
"      CARDIOLOGY    Keo Montero MD    ENCOUNTER DATE:  08/09/2022    Carlo Archer / 77 y.o. / male        CHIEF COMPLAINT / REASON FOR OFFICE VISIT     PAF (paroxysmal atrial fibrillation)  (05/17/2022 Follow up)  Hypertension    HISTORY OF PRESENT ILLNESS       HPI  Carlo Archer is a 77 y.o. male who presents today for reevaluation.  Overall patient is doing relatively well.  He has had 2 episodes of atrial fibrillation in the past 3 weeks.  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  He does not like to be in atrial fibrillation it does make him feel bad.    The following portions of the patient's history were reviewed and updated as appropriate: allergies, current medications, past family history, past medical history, past social history, past surgical history and problem list.      VITAL SIGNS     Visit Vitals  /80 (BP Location: Left arm)   Pulse (!) 49   Ht 177.8 cm (70\")   Wt 93.4 kg (206 lb)   SpO2 98%   BMI 29.56 kg/m²         Wt Readings from Last 3 Encounters:   08/09/22 93.4 kg (206 lb)   05/17/22 98.9 kg (218 lb)   05/01/22 95.3 kg (210 lb)     Body mass index is 29.56 kg/m².      REVIEW OF SYSTEMS   ROS        PHYSICAL EXAMINATION     Vitals reviewed.   Constitutional:       Appearance: Not in distress.   Pulmonary:      Effort: Pulmonary effort is normal.   Cardiovascular:      Normal rate. Regular rhythm. Normal S1. Normal S2.      Murmurs: There is no murmur.      No gallop. No click. No rub.   Pulses:     Intact distal pulses.   Edema:     Peripheral edema absent.   Neurological:      Mental Status: Alert and oriented to person, place and time.           REVIEWED DATA     Procedures    Cardiac Procedures:  1.           ASSESSMENT & PLAN      Diagnosis Plan   1. PAF (paroxysmal atrial fibrillation) ([facility])     2. Essential hypertension           SUMMARY/DISCUSSION  1.  Paroxysmal atrial fibrillation.  Patient continues to go in and out of atrial fibrillation.  He did " ask about increasing his metoprolol supinate and increase him to 37.5.  I am a little concerned because his heart rate at baseline was 49.  We will see if this helps.  Another thing that was noted today was he tested positive for sleep apnea but has not gotten his machine.  His positive test was about a year ago this is definitely contributing to him going in and out of atrial fibrillation.  My office trying to work to make sure he gets a follow-up from that aspect and get treated as soon as possible.  2.  Hypertension blood pressures good  3.  Follow-up 6 months sooner if issues      MEDICATIONS         Discharge Medications          Accurate as of August 9, 2022 11:49 AM. If you have any questions, ask your nurse or doctor.            Continue These Medications      Instructions Start Date   apixaban 5 MG tablet tablet  Commonly known as: ELIQUIS   5 mg, Oral, Every 12 Hours Scheduled      atorvastatin 40 MG tablet  Commonly known as: LIPITOR   take 1 tablet by mouth at bedtime      cetirizine 10 MG tablet  Commonly known as: zyrTEC   10 mg, Oral, As Needed      Combigan 0.2-0.5 % ophthalmic solution  Generic drug: brimonidine-timolol   1 drop, Right Eye, Every 12 Hours      dorzolamide 2 % ophthalmic solution  Commonly known as: TRUSOPT   INSTILL 1 DROP INTO BOTH EYES 2 TIMES A DAY      EPINEPHrine 0.3 MG/0.3ML solution auto-injector injection  Commonly known as: EPIPEN   EpiPen TRINY; Patient Sig: Amairani AGOSTO ; 0; 06-Jan-2015; Active      erythromycin base 500 MG tablet  Commonly known as: E-MYCIN   ONLY FOR DENTAL APPTS      esomeprazole 40 MG capsule  Commonly known as: nexIUM   Oral, Daily      fluticasone 50 MCG/ACT nasal spray  Commonly known as: FLONASE   Nasal, As Needed      hydroCHLOROthiazide 25 MG tablet  Commonly known as: HYDRODIURIL   12.5 mg, Oral, Daily      meloxicam 15 MG tablet  Commonly known as: MOBIC   15 mg, Oral, Daily      metoprolol succinate XL 25 MG 24 hr tablet  Commonly known as:  TOPROL-XL   1 TAB DAILY      rOPINIRole 0.5 MG tablet  Commonly known as: REQUIP   0.5 mg, Oral, Nightly, Take 1 hour before bedtime.      tadalafil 5 MG tablet  Commonly known as: CIALIS   5 mg, Oral, Daily      tamsulosin 0.4 MG capsule 24 hr capsule  Commonly known as: FLOMAX   1 capsule, Oral, 2 Times Daily      valsartan 320 MG tablet  Commonly known as: DIOVAN   320 mg, Oral, Daily                 **Dragon Disclaimer:   Much of this encounter note is an electronic transcription/translation of spoken language to printed text. The electronic translation of spoken language may permit erroneous, or at times, nonsensical words or phrases to be inadvertently transcribed. Although I have reviewed the note for such errors, some may still exist.

## 2022-08-10 ENCOUNTER — TELEPHONE (OUTPATIENT)
Dept: CARDIOLOGY | Facility: CLINIC | Age: 77
End: 2022-08-10

## 2022-08-10 ENCOUNTER — TELEPHONE (OUTPATIENT)
Dept: SLEEP MEDICINE | Facility: HOSPITAL | Age: 77
End: 2022-08-10

## 2022-08-10 NOTE — TELEPHONE ENCOUNTER
Scheduled follow up to speak with  for new order for CPAP . Orders over a year old , patient will need repeat sleep study due to medicare guidelines

## 2022-08-10 NOTE — TELEPHONE ENCOUNTER
Per Dr. Montero I called and spoke with  Sleep Medicine to see why pt hasn't been set up with a CPAP machine.  Paulina said the pt was given results on 06-14-21 and the order was put in for the CPAP & if pt was agreeable, he was supposed to call bk in 2 wks if he hadn't heard anything so the referral info could be faxed again.  She will contact the pt & schedule an appt to be seen./prt

## 2022-08-15 ENCOUNTER — OFFICE VISIT (OUTPATIENT)
Dept: SLEEP MEDICINE | Facility: HOSPITAL | Age: 77
End: 2022-08-15

## 2022-08-15 ENCOUNTER — TELEPHONE (OUTPATIENT)
Dept: SLEEP MEDICINE | Facility: HOSPITAL | Age: 77
End: 2022-08-15

## 2022-08-15 VITALS
DIASTOLIC BLOOD PRESSURE: 93 MMHG | SYSTOLIC BLOOD PRESSURE: 170 MMHG | OXYGEN SATURATION: 96 % | BODY MASS INDEX: 31.07 KG/M2 | HEART RATE: 50 BPM | WEIGHT: 205 LBS | HEIGHT: 68 IN

## 2022-08-15 DIAGNOSIS — G47.33 OSA (OBSTRUCTIVE SLEEP APNEA): Primary | ICD-10-CM

## 2022-08-15 PROCEDURE — G0463 HOSPITAL OUTPT CLINIC VISIT: HCPCS

## 2022-08-15 NOTE — TELEPHONE ENCOUNTER
Message sent to  for an HST order.  Pt will need a new sleep study if he wants a new machine per Medicare guidelines

## 2022-08-15 NOTE — PROGRESS NOTES
[facility]- Sleep Disorders [facility]                          Chief Complaint:   Follow up for obstructive sleep apnea    History of present illness:   Subjective      Summary:  Patient is a 77 y.o. male patient with hx of paroxysmal A fib who complains of loud snoring and nocturia.     HST 5/18/2021 showed JIMY 7.7/H and mahendra SpO2 83%.    Patient is here today to follow on the results of the sleep test.  He continues to have issues with snoring, nocturia and A. fib and was seen by Dr. Rivera recently and asked to address the sleep apnea.        ESS: Total score: 0     He was seen here in May of last year and a CPAP was ordered but unfortunately he did not receive it and apparently has called here few times and did not get help and therefore he decided to come back in person to address this issue.          Past Medical History:  Past Medical History:   Diagnosis Date   • CAD (coronary artery disease)    • Class 1 obesity with alveolar hypoventilation, serious comorbidity, and body mass index (BMI) of 30.0 to 30.9 in adult ([facility])    • BROWN (dyspnea on exertion)    • Hypertension    • DIONNE (obstructive sleep apnea)    • PAF (paroxysmal atrial fibrillation) ([facility])    ,   Past Surgical History:   Procedure Laterality Date   • CARDIAC ELECTROPHYSIOLOGY PROCEDURE N/A 4/19/2022    Procedure: Ablation atrial fibrillation;  Surgeon: Yves Samson MD;  Location: [facility] INVASIVE LOCATION;  Service: Cardiovascular;  Laterality: N/A;   • CHOLECYSTECTOMY     • CORONARY ANGIOPLASTY     • HIP SURGERY  2007    Replaced right hip   • JOINT REPLACEMENT Right     hip   • SHOULDER SURGERY  11/2020    Reverse shoulder   • TONSILLECTOMY     • VASECTOMY      and Allergies:  Bee venom, Clindamycin phosphate, Penicillin v, Penicillins, Streptomycin, and Levaquin [levofloxacin]    Medication Review:     Current Outpatient Medications:   •  apixaban (ELIQUIS) 5 MG tablet tablet, Take 1 tablet by mouth  "Every 12 (Twelve) Hours., Disp: 180 tablet, Rfl: 3  •  atorvastatin (LIPITOR) 40 MG tablet, take 1 tablet by mouth at bedtime, Disp: 30 tablet, Rfl: 5  •  brimonidine-timolol (Combigan) 0.2-0.5 % ophthalmic solution, Administer 1 drop to the right eye Every 12 (Twelve) Hours., Disp: , Rfl:   •  cetirizine (zyrTEC) 10 MG tablet, Take 10 mg by mouth As Needed., Disp: , Rfl:   •  dorzolamide (TRUSOPT) 2 % ophthalmic solution, INSTILL 1 DROP INTO BOTH EYES 2 TIMES A DAY, Disp: , Rfl:   •  EPINEPHrine (EPIPEN) 0.3 MG/0.3ML solution auto-injector injection, EpiPen TRINY; Patient Sig: EpiPen TRINY ; 0; 06-Jan-2015; Active, Disp: , Rfl:   •  erythromycin base (E-MYCIN) 500 MG tablet, ONLY FOR DENTAL APPTS, Disp: , Rfl:   •  esomeprazole (nexIUM) 40 MG capsule, Take  by mouth Daily., Disp: , Rfl:   •  fluticasone (FLONASE) 50 MCG/ACT nasal spray, into each nostril As Needed., Disp: , Rfl:   •  hydroCHLOROthiazide (HYDRODIURIL) 25 MG tablet, Take 0.5 tablets by mouth Daily., Disp: 30 tablet, Rfl: 11  •  meloxicam (MOBIC) 15 MG tablet, Take 15 mg by mouth Daily., Disp: , Rfl: 0  •  metoprolol succinate XL (TOPROL-XL) 25 MG 24 hr tablet, 1 TAB DAILY, Disp: 270 tablet, Rfl: 3  •  rOPINIRole (REQUIP) 0.5 MG tablet, Take 0.5 mg by mouth Every Night. Take 1 hour before bedtime., Disp: , Rfl:   •  tadalafil (CIALIS) 5 MG tablet, Take 5 mg by mouth Daily., Disp: , Rfl:   •  tamsulosin (FLOMAX) 0.4 MG capsule 24 hr capsule, Take 1 capsule by mouth 2 (Two) Times a Day., Disp: , Rfl:   •  valsartan (DIOVAN) 320 MG tablet, Take 320 mg by mouth Daily., Disp: , Rfl:       Objective   Vital Signs:  Vitals:    08/15/22 1100   BP: 170/93   Pulse: 50   SpO2: 96%   Weight: 93 kg (205 lb)   Height: 172.7 cm (67.99\")     Body mass index is 31.18 kg/m².          Physical Exam:   General Appearance:    Alert, cooperative, in no acute distress       Neck:   Trachea midline.   Lungs:     Clear to auscultation,respirations regular, even and              "     unlabored    Heart:    Regular rhythm and normal rate, normal S1 and S2, no            Murmur.       Neuro:   Conscious, alert, oriented x3. Appropriate mood and affect.    Extremities:   Moves all extremities well, no edema, no cyanosis, no             Redness                Assessment   1. DIONNE  2. Paroxysmal A. Fib  3. Obesity        PLAN:  Discussed the result of the sleep study and treatment with CPAP which is mostly directed toward A. fib now since patient is not significantly symptomatic..  Will initiate auto CPAP therapy.  Patient is agreeable with treatment.                This note was dictated utilizing [employer] dictation

## 2022-08-19 DIAGNOSIS — G47.33 OSA (OBSTRUCTIVE SLEEP APNEA): Primary | ICD-10-CM

## 2022-09-14 ENCOUNTER — HOSPITAL ENCOUNTER (OUTPATIENT)
Dept: SLEEP MEDICINE | Facility: HOSPITAL | Age: 77
Discharge: HOME | End: 2022-09-14
Admitting: INTERNAL MEDICINE

## 2022-09-14 DIAGNOSIS — G47.33 OSA (OBSTRUCTIVE SLEEP APNEA): ICD-10-CM

## 2022-09-14 PROCEDURE — 95806 SLEEP STUDY UNATT&RESP EFFT: CPT

## 2022-09-19 ENCOUNTER — OFFICE VISIT (OUTPATIENT)
Dept: CARDIOLOGY | Facility: CLINIC | Age: 77
End: 2022-09-19

## 2022-09-19 VITALS
SYSTOLIC BLOOD PRESSURE: 148 MMHG | WEIGHT: 205 LBS | DIASTOLIC BLOOD PRESSURE: 100 MMHG | HEIGHT: 70 IN | HEART RATE: 47 BPM | BODY MASS INDEX: 29.35 KG/M2

## 2022-09-19 DIAGNOSIS — Z98.890 H/O CARDIAC RADIOFREQUENCY ABLATION: Primary | ICD-10-CM

## 2022-09-19 DIAGNOSIS — I48.0 PAF (PAROXYSMAL ATRIAL FIBRILLATION): ICD-10-CM

## 2022-09-19 PROCEDURE — 93000 ELECTROCARDIOGRAM COMPLETE: CPT | Performed by: INTERNAL MEDICINE

## 2022-09-19 PROCEDURE — 99213 OFFICE O/P EST LOW 20 MIN: CPT | Performed by: INTERNAL MEDICINE

## 2022-09-19 NOTE — PROGRESS NOTES
Date of Office Visit: 2022  Encounter Provider: Yves Samson MD  Place of Service: [facility] CARDIOLOGY  Patient Name: Carlo Archer  : 1945    Subjective:     Encounter Date:2022      Patient ID: Carlo Archer is a 77 y.o. male who has a cc of PAF and I did PVI in April and he has had a few episodes-- watch. Last one in .     The freq and duration of the episodes is reduced post ablation.     The patient had a good year.   No anginal chest pain,   No sig brown,   No soa,   No fainting,  No orthostasis.   No edema.   Exercise tolerance: he walks.     There have been no hospital admission since the last visit.     There have been no bleeding events.       Past Medical History:   Diagnosis Date   • CAD (coronary artery disease)    • Class 1 obesity with alveolar hypoventilation, serious comorbidity, and body mass index (BMI) of 30.0 to 30.9 in adult ([facility])    • BRWON (dyspnea on exertion)    • Hypertension    • DIONNE (obstructive sleep apnea)    • PAF (paroxysmal atrial fibrillation) ([facility])        Social History     Socioeconomic History   • Marital status:    Tobacco Use   • Smoking status: Former Smoker     Packs/day: 1.50     Years: 34.00     Pack years: 51.00     Types: Cigarettes     Start date: 1960     Quit date: 1994     Years since quittin.0   • Smokeless tobacco: Never Used   • Tobacco comment: Quit in    Vaping Use   • Vaping Use: Never used   Substance and Sexual Activity   • Alcohol use: Not Currently     Alcohol/week: 0.0 standard drinks     Comment: occasional/ caffeine use 1 cup daily   • Drug use: No   • Sexual activity: Yes     Partners: Female       Family History   Problem Relation Age of Onset   • Heart attack Father    • Hypertension Sister    • Heart disease Sister    • Sudden death Brother        Review of Systems   Constitutional: Negative for fever and night sweats.   HENT: Negative for ear pain and stridor.    Eyes: Negative  "for discharge and visual halos.   Cardiovascular: Negative for cyanosis.   Respiratory: Negative for hemoptysis and sputum production.    Hematologic/Lymphatic: Negative for adenopathy.   Skin: Negative for nail changes and unusual hair distribution.   Musculoskeletal: Positive for arthritis. Negative for gout and joint swelling.   Gastrointestinal: Negative for bowel incontinence and flatus.   Genitourinary: Negative for dysuria and flank pain.   Neurological: Negative for seizures and tremors.   Psychiatric/Behavioral: Negative for altered mental status. The patient is not nervous/anxious.             Objective:     Vitals:    09/19/22 0850   BP: 148/100   Pulse: (!) 47   Weight: 93 kg (205 lb)   Height: 177.8 cm (70\")         Eyes:      General:         Right eye: No discharge.         Left eye: No discharge.   HENT:      Head: Normocephalic and atraumatic.   Neck:      Thyroid: No thyromegaly.      Vascular: No JVD.   Pulmonary:      Effort: Pulmonary effort is normal.      Breath sounds: Normal breath sounds. No rales.   Cardiovascular:      Normal rate. Regular rhythm.      No gallop.   Edema:     Peripheral edema absent.   Abdominal:      General: Bowel sounds are normal.      Palpations: Abdomen is soft.      Tenderness: There is no abdominal tenderness.   Musculoskeletal: Normal range of motion.         General: No deformity. Skin:     General: Skin is warm and dry.      Findings: No erythema.   Neurological:      Mental Status: Alert and oriented to person, place, and time.      Motor: Normal muscle tone.   Psychiatric:         Behavior: Behavior normal.         Thought Content: Thought content normal.           ECG 12 Lead    Date/Time: 9/19/2022 9:07 AM  Performed by: Yves Samson MD  Authorized by: Yves Samson MD   Comparison: compared with previous ECG   Similar to previous ECG  Rhythm: sinus bradycardia  Ectopy: atrial premature contractions            Lab Review:       Assessment:          " Diagnosis Plan   1. H/O cardiac radiofrequency ablation--PVI 4/2022     2. PAF (paroxysmal atrial fibrillation) ([facility])            Plan:     I went over two options_-- one is repeat ablation. The other live it with it.    He thinks they are increasingly reducing. He wants to take a conservative approach. I concur.     He takes an extra metoprolol and it gets better.     HTN -- mostly controlled.

## 2022-10-03 DIAGNOSIS — G47.33 OSA (OBSTRUCTIVE SLEEP APNEA): Primary | ICD-10-CM

## 2022-10-10 ENCOUNTER — TELEPHONE (OUTPATIENT)
Dept: SLEEP MEDICINE | Facility: HOSPITAL | Age: 77
End: 2022-10-10

## 2022-10-10 NOTE — TELEPHONE ENCOUNTER
Spoke with pt about results and faxed order to [facility] location. Pt will schedule a f/u appt once machine is received.

## 2023-04-14 ENCOUNTER — OFFICE VISIT (OUTPATIENT)
Dept: CARDIOLOGY | Facility: CLINIC | Age: 78
End: 2023-04-14
Payer: MEDICARE

## 2023-04-14 VITALS
OXYGEN SATURATION: 98 % | WEIGHT: 203.2 LBS | DIASTOLIC BLOOD PRESSURE: 100 MMHG | HEIGHT: 70 IN | BODY MASS INDEX: 29.09 KG/M2 | SYSTOLIC BLOOD PRESSURE: 148 MMHG | HEART RATE: 50 BPM

## 2023-04-14 DIAGNOSIS — I48.0 PAF (PAROXYSMAL ATRIAL FIBRILLATION): ICD-10-CM

## 2023-04-14 DIAGNOSIS — I10 ESSENTIAL HYPERTENSION: ICD-10-CM

## 2023-04-14 DIAGNOSIS — I25.10 CORONARY ARTERY DISEASE INVOLVING NATIVE CORONARY ARTERY OF NATIVE HEART WITHOUT ANGINA PECTORIS: Primary | ICD-10-CM

## 2023-04-14 PROCEDURE — 99214 OFFICE O/P EST MOD 30 MIN: CPT | Performed by: INTERNAL MEDICINE

## 2023-04-14 PROCEDURE — 3080F DIAST BP >= 90 MM HG: CPT | Performed by: INTERNAL MEDICINE

## 2023-04-14 PROCEDURE — 3077F SYST BP >= 140 MM HG: CPT | Performed by: INTERNAL MEDICINE

## 2023-04-14 NOTE — PROGRESS NOTES
"      CARDIOLOGY    Keo Montero MD    ENCOUNTER DATE:  04/14/2023    Carlo Archer / 78 y.o. / male        CHIEF COMPLAINT / REASON FOR OFFICE VISIT     H/O cardiac radiofrequency ablation--PVI 4/2022 (09/19/2022 Follow up)  Paroxysmal atrial fibrillation  Coronary artery disease  Hypertension    HISTORY OF PRESENT ILLNESS       HPI  Carlo Archer is a 78 y.o. male who presents today for reevaluation.  He is doing great says he feels great.  This is actually best to have seen him in a while.  Blood pressure little bit elevated today but he rushed to get in here.  He had no further signs of atrial fibrillation no chest discomfort and says he feels good.      The following portions of the patient's history were reviewed and updated as appropriate: allergies, current medications, past family history, past medical history, past social history, past surgical history and problem list.      VITAL SIGNS     Visit Vitals  /100 (BP Location: Left arm)   Pulse 50   Ht 177.8 cm (70\")   Wt 92.2 kg (203 lb 3.2 oz)   SpO2 98%   BMI 29.16 kg/m²         Wt Readings from Last 3 Encounters:   04/14/23 92.2 kg (203 lb 3.2 oz)   09/19/22 93 kg (205 lb)   08/15/22 93 kg (205 lb)     Body mass index is 29.16 kg/m².      REVIEW OF SYSTEMS   ROS        PHYSICAL EXAMINATION     Vitals reviewed.   Constitutional:       Appearance: Healthy appearance.   Pulmonary:      Effort: Pulmonary effort is normal.   Cardiovascular:      Normal rate. Regular rhythm. Normal S1. Normal S2.      Murmurs: There is no murmur.      No gallop. No click. No rub.   Pulses:     Intact distal pulses.   Edema:     Peripheral edema absent.   Neurological:      Mental Status: Alert and oriented to person, place and time.           REVIEWED DATA     Procedures    Cardiac Procedures:  1.           ASSESSMENT & PLAN      Diagnosis Plan   1. Coronary artery disease involving native coronary artery of native heart without angina pectoris        2. PAF " (paroxysmal atrial fibrillation)        3. Essential hypertension              SUMMARY/DISCUSSION  1. Coronary artery disease.  No symptoms doing well.  2. Paroxysmal A-fib.  Status post ablation he had no further recurrence and says he feels good.  3. Hypertension blood pressure is elevated told to follow it he has been under increasing stress.  4. Follow-up 1 year sooner if issues.        MEDICATIONS         Discharge Medications          Accurate as of April 14, 2023 12:32 PM. If you have any questions, ask your nurse or doctor.            Continue These Medications      Instructions Start Date   apixaban 5 MG tablet tablet  Commonly known as: ELIQUIS   5 mg, Oral, Every 12 Hours Scheduled      atorvastatin 40 MG tablet  Commonly known as: LIPITOR   take 1 tablet by mouth at bedtime      cetirizine 10 MG tablet  Commonly known as: zyrTEC   10 mg, Oral, As Needed      Combigan 0.2-0.5 % ophthalmic solution  Generic drug: brimonidine-timolol   1 drop, Right Eye, Every 12 Hours      dorzolamide 2 % ophthalmic solution  Commonly known as: TRUSOPT   INSTILL 1 DROP INTO BOTH EYES 2 TIMES A DAY      EPINEPHrine 0.3 MG/0.3ML solution auto-injector injection  Commonly known as: EPIPEN   EpiPen TRINY; Patient Sig: EpiPen TRINY ; 0; 06-Jan-2015; Active      erythromycin base 500 MG tablet  Commonly known as: E-MYCIN   ONLY FOR DENTAL APPTS      esomeprazole 40 MG capsule  Commonly known as: nexIUM   Oral, Daily      fluticasone 50 MCG/ACT nasal spray  Commonly known as: FLONASE   Nasal, As Needed      hydroCHLOROthiazide 25 MG tablet  Commonly known as: HYDRODIURIL   12.5 mg, Oral, Daily      meloxicam 15 MG tablet  Commonly known as: MOBIC   15 mg, Oral, Daily      metoprolol succinate XL 25 MG 24 hr tablet  Commonly known as: TOPROL-XL   1 TAB DAILY      rOPINIRole 0.5 MG tablet  Commonly known as: REQUIP   0.5 mg, Oral, Nightly, Take 1 hour before bedtime.      tadalafil 5 MG tablet  Commonly known as: CIALIS   5 mg, Oral,  Daily      tamsulosin 0.4 MG capsule 24 hr capsule  Commonly known as: FLOMAX   1 capsule, Oral, 2 Times Daily      valsartan 320 MG tablet  Commonly known as: DIOVAN   320 mg, Oral, Daily                 **Dragon Disclaimer:   Much of this encounter note is an electronic transcription/translation of spoken language to printed text. The electronic translation of spoken language may permit erroneous, or at times, nonsensical words or phrases to be inadvertently transcribed. Although I have reviewed the note for such errors, some may still exist.

## 2023-08-03 ENCOUNTER — TELEPHONE (OUTPATIENT)
Dept: CARDIOLOGY | Facility: CLINIC | Age: 78
End: 2023-08-03

## 2023-08-03 NOTE — TELEPHONE ENCOUNTER
Patient has CT scan at [facility] I am unable to see it could be try to hunt it down for me and I will call him back.

## 2023-08-03 NOTE — TELEPHONE ENCOUNTER
Caller: Gabbie Archer    Relationship: Emergency Contact    Best call back number:    [phone]      PATIENT IS REQUESTING YOU TO LOOK AT THE RECENT CAT SCAN HE HAD AT [facility]

## 2023-08-07 NOTE — TELEPHONE ENCOUNTER
I called patient reviewed CT with him.  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  I would like to see him back in about 3 to 6 months please make sure he has an appointment sometime in that timeframe.

## 2023-08-21 RX ORDER — APIXABAN 5 MG/1
TABLET, FILM COATED ORAL
Qty: 180 TABLET | Refills: 3 | Status: SHIPPED | OUTPATIENT
Start: 2023-08-21

## 2023-09-14 ENCOUNTER — OFFICE VISIT (OUTPATIENT)
Dept: CARDIOLOGY | Facility: CLINIC | Age: 78
End: 2023-09-14
Payer: MEDICARE

## 2023-09-14 VITALS
BODY MASS INDEX: 30.35 KG/M2 | HEIGHT: 70 IN | DIASTOLIC BLOOD PRESSURE: 100 MMHG | WEIGHT: 212 LBS | HEART RATE: 48 BPM | SYSTOLIC BLOOD PRESSURE: 158 MMHG

## 2023-09-14 DIAGNOSIS — I25.10 CORONARY ARTERY DISEASE INVOLVING NATIVE CORONARY ARTERY OF NATIVE HEART WITHOUT ANGINA PECTORIS: Primary | ICD-10-CM

## 2023-09-14 DIAGNOSIS — I10 ESSENTIAL HYPERTENSION: ICD-10-CM

## 2023-09-14 DIAGNOSIS — I48.0 PAF (PAROXYSMAL ATRIAL FIBRILLATION): ICD-10-CM

## 2023-09-14 PROCEDURE — 93000 ELECTROCARDIOGRAM COMPLETE: CPT | Performed by: INTERNAL MEDICINE

## 2023-09-14 PROCEDURE — 99214 OFFICE O/P EST MOD 30 MIN: CPT | Performed by: INTERNAL MEDICINE

## 2023-09-14 RX ORDER — SPIRONOLACTONE 25 MG/1
25 TABLET ORAL DAILY
Qty: 90 TABLET | OUTPATIENT
Start: 2023-09-14

## 2023-09-14 RX ORDER — SPIRONOLACTONE 25 MG/1
25 TABLET ORAL DAILY
Qty: 30 TABLET | Refills: 11 | Status: SHIPPED | OUTPATIENT
Start: 2023-09-14

## 2023-09-14 NOTE — PROGRESS NOTES
"      CARDIOLOGY    Keo Montero MD    ENCOUNTER DATE:  09/14/2023    Carlo Archer / 78 y.o. / male        CHIEF COMPLAINT / REASON FOR OFFICE VISIT     Coronary Artery Disease (04/14/2023 Follow up)  Paroxysmal A-fib  Hypertension  Aortic aneurysm    HISTORY OF PRESENT ILLNESS       Coronary Artery Disease    Carlo Archer is a 78 y.o. male who presents today for reevaluation.  Patient continues to have issues with his blood pressure being elevated.  Symptomatically he is doing just fine but he recently was found to have an aneurysm in the descending aorta at 5.1 x 4.6 cm.  Unfortunately not been checking his blood pressure at home but remains elevated.  I placed him on hydrochlorothiazide in the past really did not work so he stopped it.      The following portions of the patient's history were reviewed and updated as appropriate: allergies, current medications, past family history, past medical history, past social history, past surgical history and problem list.      VITAL SIGNS     Visit Vitals  /100 (BP Location: Left arm)   Pulse (!) 48   Ht 177.8 cm (70\")   Wt 96.2 kg (212 lb)   BMI 30.42 kg/m²         Wt Readings from Last 3 Encounters:   09/14/23 96.2 kg (212 lb)   04/14/23 92.2 kg (203 lb 3.2 oz)   09/19/22 93 kg (205 lb)     Body mass index is 30.42 kg/m².      REVIEW OF SYSTEMS   ROS        PHYSICAL EXAMINATION     Vitals reviewed.   Constitutional:       Appearance: Healthy appearance.   Pulmonary:      Effort: Pulmonary effort is normal.   Cardiovascular:      Normal rate. Regular rhythm. Normal S1. Normal S2.       Murmurs: There is no murmur.      No gallop.  No click. No rub.   Pulses:     Intact distal pulses.   Edema:     Peripheral edema absent.   Neurological:      Mental Status: Alert and oriented to person, place and time.         REVIEWED DATA       ECG 12 Lead    Date/Time: 9/14/2023 12:01 PM  Performed by: Keo Montero MD  Authorized by: Keo Montero MD "   Comparison: compared with previous ECG from 9/19/2022  Similar to previous ECG  Rhythm: sinus rhythm  Other findings: left ventricular hypertrophy    Clinical impression: abnormal EKG        Cardiac Procedures:            ASSESSMENT & PLAN      Diagnosis Plan   1. Coronary artery disease involving native coronary artery of native heart without angina pectoris        2. PAF (paroxysmal atrial fibrillation)        3. Essential hypertension              SUMMARY/DISCUSSION  Coronary artery disease.  Clinically stable doing well no symptoms.  ECG is unchanged.  Paroxysmal A-fib.  Status post ablation doing well.  Hypertension inadequately controlled.  I added spironolactone trying his blood pressure down.  We may need to change him over to a different type of ARB if this does not work.  He is going to follow back up in about a month for reassessment of his blood pressure.  Descending aortic aneurysm measuring at 5.1 cm.  Reviewed his blood pressure down and addressed this I see him back in a month.        MEDICATIONS         Discharge Medications            Accurate as of September 14, 2023 11:57 AM. If you have any questions, ask your nurse or doctor.                New Medications        Instructions Start Date   spironolactone 25 MG tablet  Commonly known as: ALDACTONE  Started by: Keo Montero MD   25 mg, Oral, Daily             Continue These Medications        Instructions Start Date   atorvastatin 40 MG tablet  Commonly known as: LIPITOR   take 1 tablet by mouth at bedtime      cetirizine 10 MG tablet  Commonly known as: zyrTEC   10 mg, Oral, As Needed      Combigan 0.2-0.5 % ophthalmic solution  Generic drug: brimonidine-timolol   1 drop, Right Eye, Every 12 Hours      dorzolamide 2 % ophthalmic solution  Commonly known as: TRUSOPT   INSTILL 1 DROP INTO BOTH EYES 2 TIMES A DAY      Eliquis 5 MG tablet tablet  Generic drug: apixaban   TAKE 1 TABLET BY MOUTH EVERY 12 HOURS      EPINEPHrine 0.3 MG/0.3ML  solution auto-injector injection  Commonly known as: EPIPEN   EpiPen TRINY; Patient Sig: EpiPen TRINY ; 0; 06-Jan-2015; Active      erythromycin base 500 MG tablet  Commonly known as: E-MYCIN   ONLY FOR DENTAL APPTS      esomeprazole 40 MG capsule  Commonly known as: nexIUM   Oral, Daily      fluticasone 50 MCG/ACT nasal spray  Commonly known as: FLONASE   Nasal, As Needed      hydroCHLOROthiazide 25 MG tablet  Commonly known as: HYDRODIURIL   12.5 mg, Oral, Daily      meloxicam 15 MG tablet  Commonly known as: MOBIC   15 mg, Oral, Daily      metoprolol succinate XL 25 MG 24 hr tablet  Commonly known as: TOPROL-XL   1 TAB DAILY      rOPINIRole 0.5 MG tablet  Commonly known as: REQUIP   0.5 mg, Oral, Nightly, Take 1 hour before bedtime.      tadalafil 5 MG tablet  Commonly known as: CIALIS   5 mg, Oral, Daily      tamsulosin 0.4 MG capsule 24 hr capsule  Commonly known as: FLOMAX   1 capsule, Oral, 2 Times Daily      valsartan 320 MG tablet  Commonly known as: DIOVAN   320 mg, Oral, Daily                   **Dragon Disclaimer:   Much of this encounter note is an electronic transcription/translation of spoken language to printed text. The electronic translation of spoken language may permit erroneous, or at times, nonsensical words or phrases to be inadvertently transcribed. Although I have reviewed the note for such errors, some may still exist.

## 2023-10-19 ENCOUNTER — OFFICE VISIT (OUTPATIENT)
Dept: CARDIOLOGY | Facility: CLINIC | Age: 78
End: 2023-10-19
Payer: MEDICARE

## 2023-10-19 VITALS
HEIGHT: 70 IN | OXYGEN SATURATION: 99 % | HEART RATE: 55 BPM | WEIGHT: 212.6 LBS | BODY MASS INDEX: 30.43 KG/M2 | DIASTOLIC BLOOD PRESSURE: 102 MMHG | SYSTOLIC BLOOD PRESSURE: 162 MMHG

## 2023-10-19 DIAGNOSIS — I25.10 CORONARY ARTERY DISEASE INVOLVING NATIVE CORONARY ARTERY OF NATIVE HEART WITHOUT ANGINA PECTORIS: ICD-10-CM

## 2023-10-19 DIAGNOSIS — I10 HYPERTENSION NOT AT GOAL: Primary | ICD-10-CM

## 2023-10-19 DIAGNOSIS — I48.0 PAF (PAROXYSMAL ATRIAL FIBRILLATION): ICD-10-CM

## 2023-10-19 PROCEDURE — 1159F MED LIST DOCD IN RCRD: CPT | Performed by: NURSE PRACTITIONER

## 2023-10-19 PROCEDURE — 1160F RVW MEDS BY RX/DR IN RCRD: CPT | Performed by: NURSE PRACTITIONER

## 2023-10-19 PROCEDURE — 99214 OFFICE O/P EST MOD 30 MIN: CPT | Performed by: NURSE PRACTITIONER

## 2023-10-19 PROCEDURE — 3080F DIAST BP >= 90 MM HG: CPT | Performed by: NURSE PRACTITIONER

## 2023-10-19 PROCEDURE — 3077F SYST BP >= 140 MM HG: CPT | Performed by: NURSE PRACTITIONER

## 2023-10-19 NOTE — PROGRESS NOTES
"    CARDIOLOGY        Patient Name: Carlo Archer  :1945  Age: 78 y.o.  Primary Cardiologist: Keo Montero MD  Encounter Provider:  MARCI Luis    Date of Service: 10/19/23      CHIEF COMPLAINT / REASON FOR OFFICE VISIT     Coronary Artery Disease, Follow-up, and Hypertension      HISTORY OF PRESENT ILLNESS       HPI  Carlo Archer is a 78 y.o. male who presents today for 1 month reevaluation.     Pt has a  history significant for CAD, PAF, HTN, descending aorta aneurysm.    Patient was evaluated by Dr. Montero 1 month ago where he was found to be hypertensive.  At that time spironolactone was added to his regimen.    Patient reports that when he picked up his spironolactone from the pharmacy, the pharmacist warned him about taking this with some of his other medications so he decided not to take this at all.  He reports that when he was evaluated by Dr. Montero last month he was not taking his HCTZ, he has since resumed his HCTZ.  Reports that when he monitors his blood pressure at home it is still elevated in the 150s/100s.  He is currently asymptomatic and denies chest pain, dyspnea at rest or with exertion, palpitations, lightheadedness, edema, fatigue.      The following portions of the patient's history were reviewed and updated as appropriate: allergies, current medications, past family history, past medical history, past social history, past surgical history and problem list.      VITAL SIGNS     Visit Vitals  BP (!) 162/102   Pulse 55   Ht 177.8 cm (70\")   Wt 96.4 kg (212 lb 9.6 oz)   SpO2 99%   BMI 30.50 kg/m²         Wt Readings from Last 3 Encounters:   10/19/23 96.4 kg (212 lb 9.6 oz)   23 96.2 kg (212 lb)   23 92.2 kg (203 lb 3.2 oz)     Body mass index is 30.5 kg/m².      REVIEW OF SYSTEMS   Review of Systems   Constitutional: Negative for chills, fever, weight gain and weight loss.   Cardiovascular:  Negative for leg swelling.   Respiratory:  Negative for cough, " snoring and wheezing.    Hematologic/Lymphatic: Negative for bleeding problem. Does not bruise/bleed easily.   Skin:  Negative for color change.   Musculoskeletal:  Negative for falls, joint pain and myalgias.   Gastrointestinal:  Negative for melena.   Genitourinary:  Negative for hematuria.   Neurological:  Negative for excessive daytime sleepiness.   Psychiatric/Behavioral:  Negative for depression. The patient is not nervous/anxious.            PHYSICAL EXAMINATION     Constitutional:       Appearance: Normal appearance. Well-developed.   Eyes:      Conjunctiva/sclera: Conjunctivae normal.   Neck:      Vascular: No carotid bruit.   Pulmonary:      Effort: Pulmonary effort is normal.      Breath sounds: Normal breath sounds.   Cardiovascular:      Normal rate. Regular rhythm. Normal S1. Normal S2.       Murmurs: There is no murmur.      No gallop.  No click. No rub.   Edema:     Peripheral edema absent.   Musculoskeletal: Normal range of motion. Skin:     General: Skin is warm and dry.   Neurological:      Mental Status: Alert and oriented to person, place, and time.      GCS: GCS eye subscore is 4. GCS verbal subscore is 5. GCS motor subscore is 6.   Psychiatric:         Speech: Speech normal.         Behavior: Behavior normal.         Thought Content: Thought content normal.         Judgment: Judgment normal.           REVIEWED DATA     Procedures    Cardiac Procedures:  Echocardiogram 6/17/2016.  LVEF 57%.  All LV wall segments contract normally.  Diastolic function normal.  No valvular stenosis or insufficiency.  Myocardial perfusion stress test 6/17/2016.  No evidence of ischemia.  Impressions consistent with a low risk study.  Echocardiogram 1/10/2017.  LVEF 61%.  All LV wall segments contract normally.  Normal valvular structures.  Atrial fibrillation ablation 4/19/2022.        Lab Results   Component Value Date     05/01/2022     04/18/2022    K 3.8 05/01/2022    K 4.2 04/18/2022      05/01/2022     04/18/2022    CO2 26.0 05/01/2022    CO2 28.1 04/18/2022    BUN 18 05/01/2022    BUN 12 04/18/2022    CREATININE 0.74 (L) 05/01/2022    CREATININE 0.78 04/18/2022    EGFRIFNONA 92 01/01/2020    EGFRIFNONA 86 03/02/2019    GLUCOSE 98 05/01/2022    GLUCOSE 101 (H) 04/18/2022    CALCIUM 8.7 05/01/2022    CALCIUM 9.2 04/18/2022    ALBUMIN 4.2 08/13/2021    ALBUMIN 4.20 01/01/2020    BILITOT 0.7 08/13/2021    BILITOT 0.6 01/01/2020    AST 29 08/13/2021    AST 24 01/01/2020    ALT 22 08/13/2021    ALT 20 01/01/2020     Lab Results   Component Value Date    WBC 5.66 05/01/2022    WBC 5.15 04/18/2022    HGB 13.1 05/01/2022    HGB 14.6 04/18/2022    HCT 40.7 05/01/2022    HCT 45.6 04/18/2022    MCV 91.1 05/01/2022    MCV 92.3 04/18/2022     05/01/2022     04/18/2022     Lab Results   Component Value Date    PROBNP 903.9 01/01/2020    BNP 65.8 08/13/2021     Lab Results   Component Value Date    TROPONINI <0.012 11/28/2020    TROPONINT <0.010 01/01/2020     Lab Results   Component Value Date    TSH 1.880 08/13/2021    TSH 3.430 03/02/2019             ASSESSMENT & PLAN     Diagnoses and all orders for this visit:    1. Hypertension not at goal (Primary)  Since last assessment patient has restarted his HCTZ but is not taking the spironolactone that was originally prescribed.  Blood pressure remains elevated at 162/102  Advised patient that he does need to start spironolactone 25 mg/day in addition to continuing valsartan 320 mg/day, HCTZ 25 mg/day, metoprolol succinate 50 mg/day  Patient will get repeat BMP in 1 month to assess renal function and electrolytes  -     Basic Metabolic Panel; Future    2. Coronary artery disease involving native coronary artery of native heart without angina pectoris  Currently denies angina or dyspnea  Continue atorvastatin, valsartan, metoprolol succinate    3. PAF (paroxysmal atrial fibrillation)  Heart rate controlled with metoprolol succinate  Anticoagulated  with apixaban and denies bleeding complications.          Return in about 4 weeks (around 11/16/2023) for MARCI Kang.    Future Appointments         Provider Department Center    11/17/2023 1:40 PM Yola Carmichael APRN [facility] [place]                    MEDICATIONS         Discharge Medications            Accurate as of October 19, 2023 10:43 AM. If you have any questions, ask your nurse or doctor.                Changes to Medications        Instructions Start Date   metoprolol succinate XL 25 MG 24 hr tablet  Commonly known as: TOPROL-XL  What changed:   how much to take  how to take this  when to take this   1 TAB DAILY             Continue These Medications        Instructions Start Date   atorvastatin 40 MG tablet  Commonly known as: LIPITOR   take 1 tablet by mouth at bedtime      cetirizine 10 MG tablet  Commonly known as: zyrTEC   10 mg, Oral, As Needed      Combigan 0.2-0.5 % ophthalmic solution  Generic drug: brimonidine-timolol   1 drop, Right Eye, Every 12 Hours      Eliquis 5 MG tablet tablet  Generic drug: apixaban   TAKE 1 TABLET BY MOUTH EVERY 12 HOURS      EPINEPHrine 0.3 MG/0.3ML solution auto-injector injection  Commonly known as: EPIPEN   EpiPen TRINY; Patient Sig: EpiPen TRINY ; 0; 06-Jan-2015; Active      erythromycin base 500 MG tablet  Commonly known as: E-MYCIN   ONLY FOR DENTAL APPTS      esomeprazole 40 MG capsule  Commonly known as: nexIUM   40 mg, Oral, Daily      fluticasone 50 MCG/ACT nasal spray  Commonly known as: FLONASE   Nasal, As Needed      hydroCHLOROthiazide 25 MG tablet  Commonly known as: HYDRODIURIL   12.5 mg, Oral, Daily      meloxicam 15 MG tablet  Commonly known as: MOBIC   15 mg, Oral, Daily      rOPINIRole 0.5 MG tablet  Commonly known as: REQUIP   0.5 mg, Oral, Nightly, Take 1 hour before bedtime.      spironolactone 25 MG tablet  Commonly known as: ALDACTONE   25 mg, Oral, Daily      tadalafil 5 MG tablet  Commonly known as:  CIALIS   5 mg, Oral, Daily      tamsulosin 0.4 MG capsule 24 hr capsule  Commonly known as: FLOMAX   1 capsule, Oral, 2 Times Daily      valsartan 320 MG tablet  Commonly known as: DIOVAN   320 mg, Oral, Daily                   **Dragon Disclaimer:   Much of this encounter note is an electronic transcription/translation of spoken language to printed text. The electronic translation of spoken language may permit erroneous, or at times, nonsensical words or phrases to be inadvertently transcribed. Although I have reviewed the note for such errors, some may still exist.

## 2023-11-17 ENCOUNTER — OFFICE VISIT (OUTPATIENT)
Dept: CARDIOLOGY | Facility: CLINIC | Age: 78
End: 2023-11-17
Payer: MEDICARE

## 2023-11-17 ENCOUNTER — LAB (OUTPATIENT)
Dept: LAB | Facility: HOSPITAL | Age: 78
End: 2023-11-17
Payer: MEDICARE

## 2023-11-17 VITALS
OXYGEN SATURATION: 99 % | WEIGHT: 208 LBS | HEIGHT: 70 IN | DIASTOLIC BLOOD PRESSURE: 88 MMHG | HEART RATE: 53 BPM | BODY MASS INDEX: 29.78 KG/M2 | SYSTOLIC BLOOD PRESSURE: 130 MMHG

## 2023-11-17 DIAGNOSIS — I25.10 CORONARY ARTERY DISEASE INVOLVING NATIVE CORONARY ARTERY OF NATIVE HEART WITHOUT ANGINA PECTORIS: ICD-10-CM

## 2023-11-17 DIAGNOSIS — I10 HYPERTENSION NOT AT GOAL: ICD-10-CM

## 2023-11-17 DIAGNOSIS — I48.0 PAF (PAROXYSMAL ATRIAL FIBRILLATION): ICD-10-CM

## 2023-11-17 DIAGNOSIS — I10 ESSENTIAL HYPERTENSION: Primary | ICD-10-CM

## 2023-11-17 LAB
ANION GAP SERPL CALCULATED.3IONS-SCNC: 7.9 MMOL/L (ref 5–15)
BUN SERPL-MCNC: 16 MG/DL (ref 8–23)
BUN/CREAT SERPL: 16.7 (ref 7–25)
CALCIUM SPEC-SCNC: 9.9 MG/DL (ref 8.6–10.5)
CHLORIDE SERPL-SCNC: 98 MMOL/L (ref 98–107)
CO2 SERPL-SCNC: 29.1 MMOL/L (ref 22–29)
CREAT SERPL-MCNC: 0.96 MG/DL (ref 0.76–1.27)
EGFRCR SERPLBLD CKD-EPI 2021: 80.4 ML/MIN/1.73
GLUCOSE SERPL-MCNC: 126 MG/DL (ref 65–99)
POTASSIUM SERPL-SCNC: 4.1 MMOL/L (ref 3.5–5.2)
SODIUM SERPL-SCNC: 135 MMOL/L (ref 136–145)

## 2023-11-17 PROCEDURE — 3075F SYST BP GE 130 - 139MM HG: CPT | Performed by: NURSE PRACTITIONER

## 2023-11-17 PROCEDURE — 1159F MED LIST DOCD IN RCRD: CPT | Performed by: NURSE PRACTITIONER

## 2023-11-17 PROCEDURE — 3079F DIAST BP 80-89 MM HG: CPT | Performed by: NURSE PRACTITIONER

## 2023-11-17 PROCEDURE — 80048 BASIC METABOLIC PNL TOTAL CA: CPT

## 2023-11-17 PROCEDURE — 99214 OFFICE O/P EST MOD 30 MIN: CPT | Performed by: NURSE PRACTITIONER

## 2023-11-17 PROCEDURE — 1160F RVW MEDS BY RX/DR IN RCRD: CPT | Performed by: NURSE PRACTITIONER

## 2023-11-17 PROCEDURE — 36415 COLL VENOUS BLD VENIPUNCTURE: CPT

## 2023-11-17 NOTE — PROGRESS NOTES
"    CARDIOLOGY        Patient Name: Carlo Archer  :1945  Age: 78 y.o.  Primary Cardiologist: Keo Montero MD  Encounter Provider:  MARCI Luis    Date of Service: 23      CHIEF COMPLAINT / REASON FOR OFFICE VISIT     Follow-up and Hypertension      HISTORY OF PRESENT ILLNESS       HPI  Carlo Archer is a 78 y.o. male who presents today for 1 month reevaluation.     Pt has a  history significant for CAD, PAF, HTN, descending aorta aneurysm.    Patient was evaluated by Dr. Montero 1 month ago where he was found to be hypertensive.  At that time spironolactone was added to his regimen.    Patient was evaluated by me 1 month ago where his blood pressure was uncontrolled.  At that time he had been recommended to take recommended spironolactone.  Patient had a BMP prior to appointment today.  Renal function and electrolytes were stable.  Patient reports that he has been monitoring blood pressure at home where it has been much more controlled now that he is on his current medication regimen.  He is not experiencing any adverse effects.  He is tolerating apixaban without any bleeding complications.  Currently asymptomatic.      The following portions of the patient's history were reviewed and updated as appropriate: allergies, current medications, past family history, past medical history, past social history, past surgical history and problem list.      VITAL SIGNS     Visit Vitals  /88   Pulse 53   Ht 177.8 cm (70\")   Wt 94.3 kg (208 lb)   SpO2 99%   BMI 29.84 kg/m²           Wt Readings from Last 3 Encounters:   23 94.3 kg (208 lb)   10/19/23 96.4 kg (212 lb 9.6 oz)   23 96.2 kg (212 lb)     Body mass index is 29.84 kg/m².      REVIEW OF SYSTEMS   Review of Systems   Constitutional: Negative for chills, fever, weight gain and weight loss.   Cardiovascular:  Negative for leg swelling.   Respiratory:  Negative for cough, snoring and wheezing.    Hematologic/Lymphatic: Negative " for bleeding problem. Does not bruise/bleed easily.   Skin:  Negative for color change.   Musculoskeletal:  Negative for falls, joint pain and myalgias.   Gastrointestinal:  Negative for melena.   Genitourinary:  Negative for hematuria.   Neurological:  Negative for excessive daytime sleepiness.   Psychiatric/Behavioral:  Negative for depression. The patient is not nervous/anxious.            PHYSICAL EXAMINATION     Constitutional:       Appearance: Normal appearance. Well-developed.   Eyes:      Conjunctiva/sclera: Conjunctivae normal.   Neck:      Vascular: No carotid bruit.   Pulmonary:      Effort: Pulmonary effort is normal.      Breath sounds: Normal breath sounds.   Cardiovascular:      Normal rate. Regular rhythm. Normal S1. Normal S2.       Murmurs: There is no murmur.      No gallop.  No click. No rub.   Edema:     Peripheral edema absent.   Musculoskeletal: Normal range of motion. Skin:     General: Skin is warm and dry.   Neurological:      Mental Status: Alert and oriented to person, place, and time.      GCS: GCS eye subscore is 4. GCS verbal subscore is 5. GCS motor subscore is 6.   Psychiatric:         Speech: Speech normal.         Behavior: Behavior normal.         Thought Content: Thought content normal.         Judgment: Judgment normal.           REVIEWED DATA     Procedures    Cardiac Procedures:  Echocardiogram 6/17/2016.  LVEF 57%.  All LV wall segments contract normally.  Diastolic function normal.  No valvular stenosis or insufficiency.  Myocardial perfusion stress test 6/17/2016.  No evidence of ischemia.  Impressions consistent with a low risk study.  Echocardiogram 1/10/2017.  LVEF 61%.  All LV wall segments contract normally.  Normal valvular structures.  Atrial fibrillation ablation 4/19/2022.        Lab Results   Component Value Date     (L) 11/17/2023     05/01/2022    K 4.1 11/17/2023    K 3.8 05/01/2022    CL 98 11/17/2023     05/01/2022    CO2 29.1 (H)  11/17/2023    CO2 26.0 05/01/2022    BUN 16 11/17/2023    BUN 18 05/01/2022    CREATININE 0.96 11/17/2023    CREATININE 0.74 (L) 05/01/2022    EGFRIFNONA 92 01/01/2020    EGFRIFNONA 86 03/02/2019    GLUCOSE 126 (H) 11/17/2023    GLUCOSE 98 05/01/2022    CALCIUM 9.9 11/17/2023    CALCIUM 8.7 05/01/2022    ALBUMIN 4.2 08/13/2021    ALBUMIN 4.20 01/01/2020    BILITOT 0.7 08/13/2021    BILITOT 0.6 01/01/2020    AST 29 08/13/2021    AST 24 01/01/2020    ALT 22 08/13/2021    ALT 20 01/01/2020     Lab Results   Component Value Date    WBC 5.66 05/01/2022    WBC 5.15 04/18/2022    HGB 13.1 05/01/2022    HGB 14.6 04/18/2022    HCT 40.7 05/01/2022    HCT 45.6 04/18/2022    MCV 91.1 05/01/2022    MCV 92.3 04/18/2022     05/01/2022     04/18/2022     Lab Results   Component Value Date    PROBNP 903.9 01/01/2020    BNP 65.8 08/13/2021     Lab Results   Component Value Date    TROPONINI <0.012 11/28/2020    TROPONINT <0.010 01/01/2020     Lab Results   Component Value Date    TSH 1.880 08/13/2021    TSH 3.430 03/02/2019             ASSESSMENT & PLAN     Diagnoses and all orders for this visit:    1. Essential hypertension (Primary)  Assessment & Plan:  Hypertension is improving with treatment.  Continue current treatment regimen.  Dietary sodium restriction.  Regular aerobic exercise.  Blood pressure will be reassessed at the next regular appointment.    Continue spironolactone 25 mg/day in addition to continuing valsartan 320 mg/day, HCTZ 25 mg/day, metoprolol succinate 50 mg/day  BMP reviewed and is stable.       2. PAF (paroxysmal atrial fibrillation)  Assessment & Plan:  Heart rate controlled with metoprolol succinate  Anticoagulated with apixaban and denies bleeding complications.    CHADS-VASc Risk Assessment              3 Total Score    1 Hypertension    2 Age >/= 75        Criteria that do not apply:    CHF    DM    PRIOR STROKE/TIA/THROMBO    Vascular Disease    Age 65-74    Sex: Female                3.  Coronary artery disease involving native coronary artery of native heart without angina pectoris  Assessment & Plan:  Currently denies angina or dyspnea  Continue atorvastatin, valsartan, metoprolol succinate          Return in about 6 months (around 5/17/2024) for Dr. Binta Morales.              MEDICATIONS         Discharge Medications            Accurate as of November 17, 2023 11:41 AM. If you have any questions, ask your nurse or doctor.                Changes to Medications        Instructions Start Date   hydroCHLOROthiazide 25 MG tablet  Commonly known as: HYDRODIURIL  What changed: how much to take   12.5 mg, Oral, Daily      metoprolol succinate XL 25 MG 24 hr tablet  Commonly known as: TOPROL-XL  What changed:   how much to take  how to take this  when to take this   1 TAB DAILY             Continue These Medications        Instructions Start Date   atorvastatin 40 MG tablet  Commonly known as: LIPITOR   take 1 tablet by mouth at bedtime      cetirizine 10 MG tablet  Commonly known as: zyrTEC   10 mg, Oral, As Needed      Combigan 0.2-0.5 % ophthalmic solution  Generic drug: brimonidine-timolol   1 drop, Right Eye, Every 12 Hours      Eliquis 5 MG tablet tablet  Generic drug: apixaban   TAKE 1 TABLET BY MOUTH EVERY 12 HOURS      EPINEPHrine 0.3 MG/0.3ML solution auto-injector injection  Commonly known as: EPIPEN   EpiPen TRINY; Patient Sig: Amairani AGOSTO ; 0; 06-Jan-2015; Active      erythromycin base 500 MG tablet  Commonly known as: E-MYCIN   ONLY FOR DENTAL APPTS      esomeprazole 40 MG capsule  Commonly known as: nexIUM   40 mg, Oral, Daily      fluticasone 50 MCG/ACT nasal spray  Commonly known as: FLONASE   Nasal, As Needed      meloxicam 15 MG tablet  Commonly known as: MOBIC   15 mg, Oral, Daily      rOPINIRole 0.5 MG tablet  Commonly known as: REQUIP   0.5 mg, Oral, Nightly, Take 1 hour before bedtime.      spironolactone 25 MG tablet  Commonly known as: ALDACTONE   25 mg, Oral, Daily       tadalafil 5 MG tablet  Commonly known as: CIALIS   5 mg, Oral, Daily      tamsulosin 0.4 MG capsule 24 hr capsule  Commonly known as: FLOMAX   1 capsule, Oral, 2 Times Daily      valsartan 320 MG tablet  Commonly known as: DIOVAN   320 mg, Oral, Daily                   **Dragon Disclaimer:   Much of this encounter note is an electronic transcription/translation of spoken language to printed text. The electronic translation of spoken language may permit erroneous, or at times, nonsensical words or phrases to be inadvertently transcribed. Although I have reviewed the note for such errors, some may still exist.

## 2023-11-17 NOTE — ASSESSMENT & PLAN NOTE
Hypertension is improving with treatment.  Continue current treatment regimen.  Dietary sodium restriction.  Regular aerobic exercise.  Blood pressure will be reassessed at the next regular appointment.    Continue spironolactone 25 mg/day in addition to continuing valsartan 320 mg/day, HCTZ 25 mg/day, metoprolol succinate 50 mg/day  BMP reviewed and is stable.

## 2023-11-17 NOTE — ASSESSMENT & PLAN NOTE
Heart rate controlled with metoprolol succinate  Anticoagulated with apixaban and denies bleeding complications.    CHADS-VASc Risk Assessment              3 Total Score    1 Hypertension    2 Age >/= 75        Criteria that do not apply:    CHF    DM    PRIOR STROKE/TIA/THROMBO    Vascular Disease    Age 65-74    Sex: Female

## 2024-01-08 RX ORDER — SPIRONOLACTONE 25 MG/1
25 TABLET ORAL DAILY
Qty: 90 TABLET | Refills: 3 | Status: SHIPPED | OUTPATIENT
Start: 2024-01-08

## 2024-05-20 ENCOUNTER — OFFICE VISIT (OUTPATIENT)
Dept: CARDIOLOGY | Facility: CLINIC | Age: 79
End: 2024-05-20
Payer: MEDICARE

## 2024-05-20 VITALS
BODY MASS INDEX: 28.6 KG/M2 | SYSTOLIC BLOOD PRESSURE: 146 MMHG | HEART RATE: 60 BPM | OXYGEN SATURATION: 95 % | HEIGHT: 70 IN | WEIGHT: 199.8 LBS | DIASTOLIC BLOOD PRESSURE: 88 MMHG

## 2024-05-20 DIAGNOSIS — I48.0 PAF (PAROXYSMAL ATRIAL FIBRILLATION): Primary | ICD-10-CM

## 2024-05-20 DIAGNOSIS — I10 ESSENTIAL HYPERTENSION: ICD-10-CM

## 2024-05-20 DIAGNOSIS — I25.10 CORONARY ARTERY DISEASE INVOLVING NATIVE CORONARY ARTERY OF NATIVE HEART WITHOUT ANGINA PECTORIS: ICD-10-CM

## 2024-05-20 NOTE — PROGRESS NOTES
"      CARDIOLOGY    Keo Montero MD    ENCOUNTER DATE:  05/20/2024    Carlo Archer / 79 y.o. / male        CHIEF COMPLAINT / REASON FOR OFFICE VISIT     Essential hypertension (11/17/2023 follow up)  Paroxysmal atrial fibrillation  Coronary artery disease    HISTORY OF PRESENT ILLNESS       HPI  Carlo Archer is a 79 y.o. male who presents today for reevaluation.  Clinically looks great no issues.  He said he went in A-fib over a year and feels good.  Using Mounjaro for weight loss.  He denies any types of chest discomforts or any other issues.      The following portions of the patient's history were reviewed and updated as appropriate: allergies, current medications, past family history, past medical history, past social history, past surgical history and problem list.      VITAL SIGNS     Visit Vitals  /88 (BP Location: Left arm)   Pulse 60   Ht 177.8 cm (70\")   Wt 90.6 kg (199 lb 12.8 oz)   SpO2 95%   BMI 28.67 kg/m²         Wt Readings from Last 3 Encounters:   05/20/24 90.6 kg (199 lb 12.8 oz)   11/17/23 94.3 kg (208 lb)   10/19/23 96.4 kg (212 lb 9.6 oz)     Body mass index is 28.67 kg/m².      REVIEW OF SYSTEMS   ROS        PHYSICAL EXAMINATION     Vitals reviewed.   Constitutional:       Appearance: Healthy appearance.   Pulmonary:      Effort: Pulmonary effort is normal.   Cardiovascular:      Normal rate. Regular rhythm. Normal S1. Normal S2.       Murmurs: There is no murmur.      No gallop.  No click. No rub.   Pulses:     Intact distal pulses.   Edema:     Peripheral edema absent.   Neurological:      Mental Status: Alert.           REVIEWED DATA     Procedures    Cardiac Procedures:            ASSESSMENT & PLAN      Diagnosis Plan   1. PAF (paroxysmal atrial fibrillation)        2. Essential hypertension              SUMMARY/DISCUSSION  Hypertension blood pressures great  Paroxysmal atrial fibrillation remains in sinus for the last year.  Coronary artery disease stable  I did give him a " prescription for Mounjaro.  Continue above longitudinal care will follow-up in 1 year or sooner if issues.        MEDICATIONS         Discharge Medications            Accurate as of May 20, 2024  2:34 PM. If you have any questions, ask your nurse or doctor.                New Medications        Instructions Start Date   Tirzepatide 12.5 MG/0.5ML solution pen-injector pen  Commonly known as: Mounjaro  Started by: Keo Montero MD   12.5 mg, Subcutaneous, Weekly             Changes to Medications        Instructions Start Date   hydroCHLOROthiazide 25 MG tablet  What changed: how much to take   12.5 mg, Oral, Daily      metoprolol succinate XL 25 MG 24 hr tablet  Commonly known as: TOPROL-XL  What changed:   how much to take  how to take this  when to take this   1 TAB DAILY             Continue These Medications        Instructions Start Date   atorvastatin 40 MG tablet  Commonly known as: LIPITOR   take 1 tablet by mouth at bedtime      cetirizine 10 MG tablet  Commonly known as: zyrTEC   10 mg, Oral, As Needed      Combigan 0.2-0.5 % ophthalmic solution  Generic drug: brimonidine-timolol   1 drop, Right Eye, Every 12 Hours      Eliquis 5 MG tablet tablet  Generic drug: apixaban   TAKE 1 TABLET BY MOUTH EVERY 12 HOURS      EPINEPHrine 0.3 MG/0.3ML solution auto-injector injection  Commonly known as: EPIPEN   EpiPen TRINY; Patient Sig: Amairani AGOSTO ; 0; 06-Jan-2015; Active      erythromycin base 500 MG tablet  Commonly known as: E-MYCIN   ONLY FOR DENTAL APPTS      esomeprazole 40 MG capsule  Commonly known as: nexIUM   40 mg, Oral, Daily      fluticasone 50 MCG/ACT nasal spray  Commonly known as: FLONASE   Nasal, As Needed      meloxicam 15 MG tablet  Commonly known as: MOBIC   15 mg, Oral, Daily      rOPINIRole 0.5 MG tablet  Commonly known as: REQUIP   0.5 mg, Oral, Nightly, Take 1 hour before bedtime.      spironolactone 25 MG tablet  Commonly known as: ALDACTONE   25 mg, Oral, Daily      tadalafil 5 MG  tablet  Commonly known as: CIALIS   5 mg, Oral, Daily      tamsulosin 0.4 MG capsule 24 hr capsule  Commonly known as: FLOMAX   1 capsule, Oral, 2 Times Daily      valsartan 320 MG tablet  Commonly known as: DIOVAN   320 mg, Oral, Daily                   **Philipon Disclaimer:   Much of this encounter note is an electronic transcription/translation of spoken language to printed text. The electronic translation of spoken language may permit erroneous, or at times, nonsensical words or phrases to be inadvertently transcribed. Although I have reviewed the note for such errors, some may still exist.

## 2024-05-21 ENCOUNTER — TELEPHONE (OUTPATIENT)
Dept: CARDIOLOGY | Facility: CLINIC | Age: 79
End: 2024-05-21
Payer: MEDICARE

## 2024-05-21 NOTE — TELEPHONE ENCOUNTER
Received PA request for Michael.  Per Dr. Montero PA not needed since pt is paying cash.  I called/informed pt's pharmacy./prt

## 2024-07-22 ENCOUNTER — TELEPHONE (OUTPATIENT)
Dept: CARDIOLOGY | Facility: CLINIC | Age: 79
End: 2024-07-22
Payer: MEDICARE

## 2024-07-22 NOTE — TELEPHONE ENCOUNTER
[facility] left msg saying pt was prescribed Paxlovid by a telehealth doctor and asks if it's OK to take this with his Eliquis or should he stop while taking Paxlovid?     LOV 05/20/24,  NOV 05/21/25    ThanksDunia @ [phone]

## 2024-07-22 NOTE — TELEPHONE ENCOUNTER
Caller: Carlo Archer    Relationship to patient: Self    Best call back number: [phone]    Patient is needing: PATIENT HAS COVID MAKING SURE ITS OK FOR HIM TO TAKE PAXLOVID.

## (undated) DEVICE — Device: Brand: THERMOCOOL SMARTTOUCH SF

## (undated) DEVICE — SYS TRNSEP ACC ACROSS ADLT BRK1 71CM

## (undated) DEVICE — Device: Brand: REFERENCE PATCH CARTO 3

## (undated) DEVICE — CLEARSIGHT FINGER CUFF LARGE MULTI PACK: Brand: CLEARSIGHT

## (undated) DEVICE — Device: Brand: WEBSTER CS

## (undated) DEVICE — Device: Brand: SMARTABLATE

## (undated) DEVICE — PINNACLE INTRODUCER SHEATH: Brand: PINNACLE

## (undated) DEVICE — Device: Brand: LASSO NAV

## (undated) DEVICE — Device: Brand: SOUNDSTAR

## (undated) DEVICE — LOU EP: Brand: MEDLINE INDUSTRIES, INC.

## (undated) DEVICE — PREF.GUIDING SHEATH W/MULT.CRV: Brand: PREFACE